# Patient Record
Sex: FEMALE
[De-identification: names, ages, dates, MRNs, and addresses within clinical notes are randomized per-mention and may not be internally consistent; named-entity substitution may affect disease eponyms.]

---

## 2017-10-09 ENCOUNTER — APPOINTMENT (OUTPATIENT)
Dept: OBGYN | Facility: CLINIC | Age: 32
End: 2017-10-09
Payer: COMMERCIAL

## 2017-10-09 VITALS
BODY MASS INDEX: 37.67 KG/M2 | DIASTOLIC BLOOD PRESSURE: 70 MMHG | SYSTOLIC BLOOD PRESSURE: 122 MMHG | WEIGHT: 240 LBS | HEIGHT: 67 IN

## 2017-10-09 DIAGNOSIS — Z80.0 FAMILY HISTORY OF MALIGNANT NEOPLASM OF DIGESTIVE ORGANS: ICD-10-CM

## 2017-10-09 DIAGNOSIS — Z82.49 FAMILY HISTORY OF ISCHEMIC HEART DISEASE AND OTHER DISEASES OF THE CIRCULATORY SYSTEM: ICD-10-CM

## 2017-10-09 DIAGNOSIS — Z83.3 FAMILY HISTORY OF DIABETES MELLITUS: ICD-10-CM

## 2017-10-09 DIAGNOSIS — Z00.00 ENCOUNTER FOR GENERAL ADULT MEDICAL EXAMINATION W/OUT ABNORMAL FINDINGS: ICD-10-CM

## 2017-10-09 DIAGNOSIS — Z80.2 FAMILY HISTORY OF MALIGNANT NEOPLASM OF OTHER RESPIRATORY AND INTRATHORACIC ORGANS: ICD-10-CM

## 2017-10-09 LAB
BILIRUB UR QL STRIP: NORMAL
CLARITY UR: CLEAR
COLLECTION METHOD: NORMAL
GLUCOSE UR-MCNC: NORMAL
HCG UR QL: 0.2 EU/DL
HGB UR QL STRIP.AUTO: NORMAL
KETONES UR-MCNC: NORMAL
LEUKOCYTE ESTERASE UR QL STRIP: NORMAL
NITRITE UR QL STRIP: NORMAL
PH UR STRIP: 6.5
PROT UR STRIP-MCNC: NORMAL
SP GR UR STRIP: 1.02

## 2017-10-09 PROCEDURE — 99385 PREV VISIT NEW AGE 18-39: CPT

## 2017-10-09 PROCEDURE — 81003 URINALYSIS AUTO W/O SCOPE: CPT | Mod: QW

## 2017-10-09 PROCEDURE — 36415 COLL VENOUS BLD VENIPUNCTURE: CPT

## 2017-10-10 LAB
25(OH)D3 SERPL-MCNC: 16.9 NG/ML
ALBUMIN SERPL ELPH-MCNC: 4.2 G/DL
ALP BLD-CCNC: 85 U/L
ALT SERPL-CCNC: 34 U/L
ANION GAP SERPL CALC-SCNC: 16 MMOL/L
APPEARANCE: CLEAR
AST SERPL-CCNC: 26 U/L
BACTERIA: ABNORMAL
BASOPHILS # BLD AUTO: 0.02 K/UL
BASOPHILS NFR BLD AUTO: 0.3 %
BILIRUB SERPL-MCNC: 1 MG/DL
BILIRUBIN URINE: NEGATIVE
BLOOD URINE: NEGATIVE
BUN SERPL-MCNC: 13 MG/DL
C TRACH RRNA SPEC QL NAA+PROBE: NOT DETECTED
CALCIUM OXALATE CRYSTALS: ABNORMAL
CALCIUM SERPL-MCNC: 9.2 MG/DL
CHLORIDE SERPL-SCNC: 102 MMOL/L
CHOLEST SERPL-MCNC: 179 MG/DL
CHOLEST/HDLC SERPL: 5.1 RATIO
CO2 SERPL-SCNC: 23 MMOL/L
COLOR: YELLOW
CREAT SERPL-MCNC: 0.68 MG/DL
EOSINOPHIL # BLD AUTO: 0.14 K/UL
EOSINOPHIL NFR BLD AUTO: 2 %
GLUCOSE QUALITATIVE U: NEGATIVE MG/DL
GLUCOSE SERPL-MCNC: 53 MG/DL
HBA1C MFR BLD HPLC: 5.4 %
HBV SURFACE AG SER QL: NONREACTIVE
HCT VFR BLD CALC: 37.4 %
HCV AB SER QL: NONREACTIVE
HCV S/CO RATIO: 0.28 S/CO
HDLC SERPL-MCNC: 35 MG/DL
HGB BLD-MCNC: 11.8 G/DL
HIV1+2 AB SPEC QL IA.RAPID: NONREACTIVE
HPV HIGH+LOW RISK DNA PNL CVX: NOT DETECTED
HYALINE CASTS: 0 /LPF
IMM GRANULOCYTES NFR BLD AUTO: 0.1 %
KETONES URINE: NEGATIVE
LDLC SERPL CALC-MCNC: 101 MG/DL
LEUKOCYTE ESTERASE URINE: NEGATIVE
LYMPHOCYTES # BLD AUTO: 2.2 K/UL
LYMPHOCYTES NFR BLD AUTO: 30.9 %
MAN DIFF?: NORMAL
MCHC RBC-ENTMCNC: 26.8 PG
MCHC RBC-ENTMCNC: 31.6 GM/DL
MCV RBC AUTO: 85 FL
MICROSCOPIC-UA: NORMAL
MONOCYTES # BLD AUTO: 0.47 K/UL
MONOCYTES NFR BLD AUTO: 6.6 %
N GONORRHOEA RRNA SPEC QL NAA+PROBE: NOT DETECTED
NEUTROPHILS # BLD AUTO: 4.28 K/UL
NEUTROPHILS NFR BLD AUTO: 60.1 %
NITRITE URINE: NEGATIVE
PH URINE: 6.5
PLATELET # BLD AUTO: 318 K/UL
POTASSIUM SERPL-SCNC: 3.9 MMOL/L
PROT SERPL-MCNC: 7.6 G/DL
PROTEIN URINE: NEGATIVE MG/DL
RBC # BLD: 4.4 M/UL
RBC # FLD: 14.9 %
RED BLOOD CELLS URINE: 4 /HPF
RUBV IGG FLD-ACNC: 15 INDEX
RUBV IGG SER-IMP: POSITIVE
SODIUM SERPL-SCNC: 141 MMOL/L
SOURCE TP AMPLIFICATION: NORMAL
SPECIFIC GRAVITY URINE: 1.02
SQUAMOUS EPITHELIAL CELLS: 4 /HPF
T PALLIDUM AB SER QL IA: NEGATIVE
TRIGL SERPL-MCNC: 213 MG/DL
TSH SERPL-ACNC: 2.55 UIU/ML
UROBILINOGEN URINE: NEGATIVE MG/DL
WBC # FLD AUTO: 7.12 K/UL
WHITE BLOOD CELLS URINE: 2 /HPF

## 2017-10-12 LAB — CYTOLOGY CVX/VAG DOC THIN PREP: NORMAL

## 2017-10-18 LAB
AR GENE MUT ANL BLD/T: NORMAL
CFTR MUT TESTED BLD/T: NORMAL

## 2017-10-19 ENCOUNTER — RESULT REVIEW (OUTPATIENT)
Age: 32
End: 2017-10-19

## 2017-10-31 ENCOUNTER — APPOINTMENT (OUTPATIENT)
Dept: OBGYN | Facility: CLINIC | Age: 32
End: 2017-10-31

## 2017-11-01 ENCOUNTER — APPOINTMENT (OUTPATIENT)
Dept: OBGYN | Facility: CLINIC | Age: 32
End: 2017-11-01
Payer: COMMERCIAL

## 2017-11-01 VITALS
WEIGHT: 240 LBS | BODY MASS INDEX: 37.67 KG/M2 | DIASTOLIC BLOOD PRESSURE: 78 MMHG | SYSTOLIC BLOOD PRESSURE: 124 MMHG | HEIGHT: 67 IN

## 2017-11-01 PROCEDURE — 76817 TRANSVAGINAL US OBSTETRIC: CPT

## 2017-11-01 PROCEDURE — 81025 URINE PREGNANCY TEST: CPT

## 2017-11-01 PROCEDURE — 99213 OFFICE O/P EST LOW 20 MIN: CPT | Mod: 25

## 2017-11-02 LAB
HCG UR QL: POSITIVE
QUALITY CONTROL: YES

## 2017-11-21 DIAGNOSIS — Z86.018 PERSONAL HISTORY OF OTHER BENIGN NEOPLASM: ICD-10-CM

## 2017-11-22 ENCOUNTER — APPOINTMENT (OUTPATIENT)
Dept: OBGYN | Facility: CLINIC | Age: 32
End: 2017-11-22

## 2017-11-24 ENCOUNTER — NON-APPOINTMENT (OUTPATIENT)
Age: 32
End: 2017-11-24

## 2017-11-24 ENCOUNTER — APPOINTMENT (OUTPATIENT)
Dept: OBGYN | Facility: CLINIC | Age: 32
End: 2017-11-24
Payer: COMMERCIAL

## 2017-11-24 PROCEDURE — 36415 COLL VENOUS BLD VENIPUNCTURE: CPT

## 2017-11-24 PROCEDURE — 0501F PRENATAL FLOW SHEET: CPT

## 2017-11-26 ENCOUNTER — TRANSCRIPTION ENCOUNTER (OUTPATIENT)
Age: 32
End: 2017-11-26

## 2017-11-27 ENCOUNTER — RESULT REVIEW (OUTPATIENT)
Age: 32
End: 2017-11-27

## 2017-11-27 LAB
ABO + RH PNL BLD: NORMAL
APPEARANCE: CLEAR
BACTERIA UR CULT: NORMAL
BACTERIA: NEGATIVE
BILIRUBIN URINE: NEGATIVE
BLD GP AB SCN SERPL QL: NORMAL
BLOOD URINE: NEGATIVE
COLOR: YELLOW
GLUCOSE 1H P 50 G GLC PO SERPL-MCNC: 131 MG/DL
GLUCOSE QUALITATIVE U: NEGATIVE MG/DL
HYALINE CASTS: 8 /LPF
KETONES URINE: ABNORMAL
LEUKOCYTE ESTERASE URINE: NEGATIVE
MICROSCOPIC-UA: NORMAL
NITRITE URINE: NEGATIVE
PH URINE: 5
PROTEIN URINE: NEGATIVE MG/DL
RED BLOOD CELLS URINE: 6 /HPF
SPECIFIC GRAVITY URINE: 1.03
SQUAMOUS EPITHELIAL CELLS: 6 /HPF
UROBILINOGEN URINE: NEGATIVE MG/DL
WHITE BLOOD CELLS URINE: 10 /HPF

## 2017-11-28 ENCOUNTER — APPOINTMENT (OUTPATIENT)
Dept: ANTEPARTUM | Facility: CLINIC | Age: 32
End: 2017-11-28

## 2017-11-30 ENCOUNTER — RESULT REVIEW (OUTPATIENT)
Age: 32
End: 2017-11-30

## 2017-11-30 LAB
CREAT 24H UR-MCNC: 1.6 G/24 H
CREAT 24H UR-MCNC: 1.6 G/24 H
CREAT ?TM UR-MCNC: 63 MG/DL
CREAT ?TM UR-MCNC: 64 MG/DL
PROT 24H UR-MRATE: 4 MG/DL
PROT ?TM UR-MCNC: 24 HR
PROT ?TM UR-MCNC: 24 HR
PROT UR-MCNC: 103 MG/24 H
SPECIMEN VOL 24H UR: 2575 ML
SPECIMEN VOL 24H UR: 2575 ML

## 2017-12-12 ENCOUNTER — APPOINTMENT (OUTPATIENT)
Dept: ANTEPARTUM | Facility: CLINIC | Age: 32
End: 2017-12-12
Payer: COMMERCIAL

## 2017-12-12 PROCEDURE — 76801 OB US < 14 WKS SINGLE FETUS: CPT

## 2017-12-12 PROCEDURE — 76813 OB US NUCHAL MEAS 1 GEST: CPT

## 2017-12-12 PROCEDURE — 36416 COLLJ CAPILLARY BLOOD SPEC: CPT

## 2017-12-14 ENCOUNTER — LABORATORY RESULT (OUTPATIENT)
Age: 32
End: 2017-12-14

## 2017-12-15 ENCOUNTER — APPOINTMENT (OUTPATIENT)
Dept: OBGYN | Facility: CLINIC | Age: 32
End: 2017-12-15
Payer: COMMERCIAL

## 2017-12-15 ENCOUNTER — NON-APPOINTMENT (OUTPATIENT)
Age: 32
End: 2017-12-15

## 2017-12-15 VITALS
SYSTOLIC BLOOD PRESSURE: 114 MMHG | BODY MASS INDEX: 38.61 KG/M2 | WEIGHT: 246 LBS | RESPIRATION RATE: 16 BRPM | DIASTOLIC BLOOD PRESSURE: 82 MMHG | HEIGHT: 67 IN

## 2017-12-15 LAB
BILIRUB UR QL STRIP: NORMAL
CLARITY UR: CLEAR
COLLECTION METHOD: NORMAL
GLUCOSE UR-MCNC: NORMAL
HCG UR QL: 0.2 EU/DL
HGB UR QL STRIP.AUTO: NORMAL
KETONES UR-MCNC: NORMAL
LEUKOCYTE ESTERASE UR QL STRIP: NORMAL
NITRITE UR QL STRIP: NORMAL
PH UR STRIP: 6
PROT UR STRIP-MCNC: NORMAL
SP GR UR STRIP: 1.01

## 2017-12-15 PROCEDURE — 0502F SUBSEQUENT PRENATAL CARE: CPT

## 2017-12-15 PROCEDURE — 76817 TRANSVAGINAL US OBSTETRIC: CPT

## 2017-12-15 RX ORDER — PINDOLOL 10 MG/1
10 TABLET ORAL
Refills: 0 | Status: DISCONTINUED | COMMUNITY
End: 2017-12-15

## 2017-12-18 ENCOUNTER — RESULT REVIEW (OUTPATIENT)
Age: 32
End: 2017-12-18

## 2017-12-26 ENCOUNTER — APPOINTMENT (OUTPATIENT)
Dept: OBGYN | Facility: CLINIC | Age: 32
End: 2017-12-26
Payer: COMMERCIAL

## 2017-12-26 ENCOUNTER — NON-APPOINTMENT (OUTPATIENT)
Age: 32
End: 2017-12-26

## 2017-12-26 VITALS
RESPIRATION RATE: 16 BRPM | WEIGHT: 250 LBS | SYSTOLIC BLOOD PRESSURE: 102 MMHG | DIASTOLIC BLOOD PRESSURE: 60 MMHG | BODY MASS INDEX: 39.24 KG/M2 | HEIGHT: 67 IN

## 2017-12-26 LAB
BILIRUB UR QL STRIP: NORMAL
CLARITY UR: NORMAL
COLLECTION METHOD: NORMAL
GLUCOSE UR-MCNC: NORMAL
HCG UR QL: 0.2 EU/DL
HGB UR QL STRIP.AUTO: NORMAL
KETONES UR-MCNC: NORMAL
LEUKOCYTE ESTERASE UR QL STRIP: NORMAL
NITRITE UR QL STRIP: NORMAL
PH UR STRIP: 6.5
PROT UR STRIP-MCNC: NORMAL
SP GR UR STRIP: 1.01

## 2017-12-26 PROCEDURE — 0502F SUBSEQUENT PRENATAL CARE: CPT

## 2018-01-16 ENCOUNTER — TRANSCRIPTION ENCOUNTER (OUTPATIENT)
Age: 33
End: 2018-01-16

## 2018-01-23 ENCOUNTER — TRANSCRIPTION ENCOUNTER (OUTPATIENT)
Age: 33
End: 2018-01-23

## 2018-01-23 ENCOUNTER — APPOINTMENT (OUTPATIENT)
Dept: OBGYN | Facility: CLINIC | Age: 33
End: 2018-01-23
Payer: COMMERCIAL

## 2018-01-23 VITALS
DIASTOLIC BLOOD PRESSURE: 70 MMHG | BODY MASS INDEX: 41.49 KG/M2 | HEIGHT: 67 IN | WEIGHT: 264.33 LBS | SYSTOLIC BLOOD PRESSURE: 122 MMHG

## 2018-01-23 PROCEDURE — 36415 COLL VENOUS BLD VENIPUNCTURE: CPT

## 2018-01-23 PROCEDURE — 0502F SUBSEQUENT PRENATAL CARE: CPT

## 2018-01-27 ENCOUNTER — NON-APPOINTMENT (OUTPATIENT)
Age: 33
End: 2018-01-27

## 2018-01-30 LAB
1ST TRIMESTER DATA: NORMAL
2ND TRIMESTER DATA: NORMAL
AFP PNL SERPL: NORMAL
AFP SERPL-ACNC: NORMAL
AFP SERPL-ACNC: NORMAL
B-HCG FREE SERPL-MCNC: NORMAL
CLINICAL BIOCHEMIST REVIEW: NORMAL
FREE BETA HCG 1ST TRIMESTER: NORMAL
INHIBIN A SERPL-MCNC: NORMAL
NASAL BONE: PRESENT
NOTES NTD: NORMAL
NT: NORMAL
PAPP-A SERPL-ACNC: NORMAL
U ESTRIOL SERPL-SCNC: NORMAL

## 2018-02-06 ENCOUNTER — APPOINTMENT (OUTPATIENT)
Dept: ANTEPARTUM | Facility: CLINIC | Age: 33
End: 2018-02-06
Payer: COMMERCIAL

## 2018-02-06 ENCOUNTER — ASOB RESULT (OUTPATIENT)
Age: 33
End: 2018-02-06

## 2018-02-06 PROCEDURE — 76811 OB US DETAILED SNGL FETUS: CPT

## 2018-02-06 PROCEDURE — 76817 TRANSVAGINAL US OBSTETRIC: CPT

## 2018-02-20 ENCOUNTER — APPOINTMENT (OUTPATIENT)
Dept: OBGYN | Facility: CLINIC | Age: 33
End: 2018-02-20
Payer: COMMERCIAL

## 2018-02-20 ENCOUNTER — NON-APPOINTMENT (OUTPATIENT)
Age: 33
End: 2018-02-20

## 2018-02-20 VITALS
BODY MASS INDEX: 40.49 KG/M2 | DIASTOLIC BLOOD PRESSURE: 64 MMHG | TEMPERATURE: 98.6 F | RESPIRATION RATE: 16 BRPM | HEIGHT: 67 IN | SYSTOLIC BLOOD PRESSURE: 104 MMHG | WEIGHT: 258 LBS

## 2018-02-20 PROCEDURE — 0502F SUBSEQUENT PRENATAL CARE: CPT

## 2018-03-13 ENCOUNTER — NON-APPOINTMENT (OUTPATIENT)
Age: 33
End: 2018-03-13

## 2018-03-13 ENCOUNTER — APPOINTMENT (OUTPATIENT)
Dept: OBGYN | Facility: CLINIC | Age: 33
End: 2018-03-13
Payer: COMMERCIAL

## 2018-03-13 VITALS
TEMPERATURE: 98.2 F | RESPIRATION RATE: 16 BRPM | HEIGHT: 67 IN | DIASTOLIC BLOOD PRESSURE: 78 MMHG | BODY MASS INDEX: 40.97 KG/M2 | SYSTOLIC BLOOD PRESSURE: 106 MMHG | WEIGHT: 261 LBS

## 2018-03-13 LAB
BILIRUB UR QL STRIP: NORMAL
CLARITY UR: CLEAR
COLLECTION METHOD: NORMAL
GLUCOSE UR-MCNC: NORMAL
HCG UR QL: 0.2 EU/DL
HGB UR QL STRIP.AUTO: NORMAL
KETONES UR-MCNC: NORMAL
LEUKOCYTE ESTERASE UR QL STRIP: NORMAL
NITRITE UR QL STRIP: NORMAL
PH UR STRIP: 7
PROT UR STRIP-MCNC: NORMAL
SP GR UR STRIP: 1.01

## 2018-03-13 PROCEDURE — 36415 COLL VENOUS BLD VENIPUNCTURE: CPT

## 2018-03-13 PROCEDURE — 82950 GLUCOSE TEST: CPT | Mod: QW

## 2018-03-13 PROCEDURE — 0502F SUBSEQUENT PRENATAL CARE: CPT

## 2018-03-14 ENCOUNTER — MESSAGE (OUTPATIENT)
Age: 33
End: 2018-03-14

## 2018-03-14 LAB
BASOPHILS # BLD AUTO: 0.01 K/UL
BASOPHILS NFR BLD AUTO: 0.1 %
EOSINOPHIL # BLD AUTO: 0.1 K/UL
EOSINOPHIL NFR BLD AUTO: 1.3 %
GLUCOSE 1H P 50 G GLC PO SERPL-MCNC: 93 MG/DL
HCT VFR BLD CALC: 32.9 %
HGB BLD-MCNC: 10.7 G/DL
IMM GRANULOCYTES NFR BLD AUTO: 0.3 %
LYMPHOCYTES # BLD AUTO: 1.4 K/UL
LYMPHOCYTES NFR BLD AUTO: 18.2 %
MAN DIFF?: NORMAL
MCHC RBC-ENTMCNC: 27.6 PG
MCHC RBC-ENTMCNC: 32.5 GM/DL
MCV RBC AUTO: 84.8 FL
MONOCYTES # BLD AUTO: 0.58 K/UL
MONOCYTES NFR BLD AUTO: 7.5 %
NEUTROPHILS # BLD AUTO: 5.59 K/UL
NEUTROPHILS NFR BLD AUTO: 72.6 %
PLATELET # BLD AUTO: 311 K/UL
RBC # BLD: 3.88 M/UL
RBC # FLD: 14.9 %
WBC # FLD AUTO: 7.7 K/UL

## 2018-04-03 ENCOUNTER — APPOINTMENT (OUTPATIENT)
Dept: OBGYN | Facility: CLINIC | Age: 33
End: 2018-04-03
Payer: COMMERCIAL

## 2018-04-03 ENCOUNTER — NON-APPOINTMENT (OUTPATIENT)
Age: 33
End: 2018-04-03

## 2018-04-03 VITALS
WEIGHT: 264.55 LBS | HEIGHT: 67 IN | BODY MASS INDEX: 41.52 KG/M2 | TEMPERATURE: 98.4 F | DIASTOLIC BLOOD PRESSURE: 70 MMHG | SYSTOLIC BLOOD PRESSURE: 120 MMHG

## 2018-04-03 LAB
BILIRUB UR QL STRIP: NORMAL
CLARITY UR: CLEAR
COLLECTION METHOD: NORMAL
GLUCOSE UR-MCNC: NORMAL
HCG UR QL: 0.2 EU/DL
HGB UR QL STRIP.AUTO: NORMAL
KETONES UR-MCNC: NORMAL
LEUKOCYTE ESTERASE UR QL STRIP: NORMAL
NITRITE UR QL STRIP: NORMAL
PH UR STRIP: 6
PROT UR STRIP-MCNC: NORMAL
SP GR UR STRIP: 1.03

## 2018-04-03 PROCEDURE — 90715 TDAP VACCINE 7 YRS/> IM: CPT

## 2018-04-03 PROCEDURE — 81003 URINALYSIS AUTO W/O SCOPE: CPT | Mod: QW

## 2018-04-03 PROCEDURE — 90471 IMMUNIZATION ADMIN: CPT

## 2018-04-03 PROCEDURE — 0502F SUBSEQUENT PRENATAL CARE: CPT

## 2018-04-05 ENCOUNTER — APPOINTMENT (OUTPATIENT)
Dept: ANTEPARTUM | Facility: CLINIC | Age: 33
End: 2018-04-05
Payer: COMMERCIAL

## 2018-04-05 ENCOUNTER — ASOB RESULT (OUTPATIENT)
Age: 33
End: 2018-04-05

## 2018-04-05 PROCEDURE — 76816 OB US FOLLOW-UP PER FETUS: CPT

## 2018-04-05 PROCEDURE — 76819 FETAL BIOPHYS PROFIL W/O NST: CPT

## 2018-04-17 ENCOUNTER — APPOINTMENT (OUTPATIENT)
Dept: OBGYN | Facility: CLINIC | Age: 33
End: 2018-04-17
Payer: COMMERCIAL

## 2018-04-17 VITALS
SYSTOLIC BLOOD PRESSURE: 120 MMHG | BODY MASS INDEX: 42.46 KG/M2 | HEIGHT: 67 IN | DIASTOLIC BLOOD PRESSURE: 80 MMHG | TEMPERATURE: 98.3 F | WEIGHT: 270.5 LBS

## 2018-04-17 DIAGNOSIS — Z34.92 ENCOUNTER FOR SUPERVISION OF NORMAL PREGNANCY, UNSPECIFIED, SECOND TRIMESTER: ICD-10-CM

## 2018-04-17 PROCEDURE — 0502F SUBSEQUENT PRENATAL CARE: CPT

## 2018-04-17 PROCEDURE — 81003 URINALYSIS AUTO W/O SCOPE: CPT | Mod: QW

## 2018-04-17 PROCEDURE — 76817 TRANSVAGINAL US OBSTETRIC: CPT

## 2018-04-18 ENCOUNTER — NON-APPOINTMENT (OUTPATIENT)
Age: 33
End: 2018-04-18

## 2018-04-18 PROBLEM — Z34.92 ENCOUNTER FOR PREGNANCY RELATED EXAMINATION IN SECOND TRIMESTER: Status: RESOLVED | Noted: 2017-12-26 | Resolved: 2018-04-18

## 2018-04-18 LAB
BILIRUB UR QL STRIP: NORMAL
GLUCOSE UR-MCNC: NORMAL
HCG UR QL: 1 EU/DL
HGB UR QL STRIP.AUTO: NORMAL
KETONES UR-MCNC: NORMAL
LEUKOCYTE ESTERASE UR QL STRIP: NORMAL
NITRITE UR QL STRIP: NORMAL
PH UR STRIP: 6
PROT UR STRIP-MCNC: NORMAL
SP GR UR STRIP: 1.02

## 2018-04-30 ENCOUNTER — NON-APPOINTMENT (OUTPATIENT)
Age: 33
End: 2018-04-30

## 2018-04-30 ENCOUNTER — APPOINTMENT (OUTPATIENT)
Dept: OBGYN | Facility: CLINIC | Age: 33
End: 2018-04-30
Payer: COMMERCIAL

## 2018-04-30 VITALS
SYSTOLIC BLOOD PRESSURE: 124 MMHG | TEMPERATURE: 97.7 F | HEIGHT: 67 IN | BODY MASS INDEX: 41.59 KG/M2 | DIASTOLIC BLOOD PRESSURE: 88 MMHG | WEIGHT: 265 LBS

## 2018-04-30 PROCEDURE — 59025 FETAL NON-STRESS TEST: CPT

## 2018-04-30 PROCEDURE — 0502F SUBSEQUENT PRENATAL CARE: CPT

## 2018-04-30 PROCEDURE — 76817 TRANSVAGINAL US OBSTETRIC: CPT

## 2018-04-30 PROCEDURE — 81003 URINALYSIS AUTO W/O SCOPE: CPT | Mod: QW

## 2018-05-08 ENCOUNTER — NON-APPOINTMENT (OUTPATIENT)
Age: 33
End: 2018-05-08

## 2018-05-08 ENCOUNTER — APPOINTMENT (OUTPATIENT)
Dept: OBGYN | Facility: CLINIC | Age: 33
End: 2018-05-08
Payer: COMMERCIAL

## 2018-05-08 VITALS
HEIGHT: 67 IN | SYSTOLIC BLOOD PRESSURE: 110 MMHG | RESPIRATION RATE: 16 BRPM | WEIGHT: 266 LBS | BODY MASS INDEX: 41.75 KG/M2 | DIASTOLIC BLOOD PRESSURE: 60 MMHG

## 2018-05-08 PROCEDURE — 81003 URINALYSIS AUTO W/O SCOPE: CPT | Mod: QW

## 2018-05-08 PROCEDURE — 0502F SUBSEQUENT PRENATAL CARE: CPT

## 2018-05-15 ENCOUNTER — APPOINTMENT (OUTPATIENT)
Dept: OBGYN | Facility: CLINIC | Age: 33
End: 2018-05-15
Payer: COMMERCIAL

## 2018-05-15 VITALS
HEIGHT: 67 IN | BODY MASS INDEX: 42.53 KG/M2 | WEIGHT: 271 LBS | SYSTOLIC BLOOD PRESSURE: 118 MMHG | TEMPERATURE: 98.3 F | RESPIRATION RATE: 16 BRPM | DIASTOLIC BLOOD PRESSURE: 78 MMHG

## 2018-05-15 LAB
BILIRUB UR QL STRIP: NORMAL
CLARITY UR: CLEAR
COLLECTION METHOD: NORMAL
GLUCOSE UR-MCNC: NORMAL
HCG UR QL: 1 EU/DL
HGB UR QL STRIP.AUTO: NORMAL
KETONES UR-MCNC: NORMAL
LEUKOCYTE ESTERASE UR QL STRIP: NORMAL
NITRITE UR QL STRIP: NORMAL
PH UR STRIP: 7
PROT UR STRIP-MCNC: NORMAL
SP GR UR STRIP: 1.02

## 2018-05-15 PROCEDURE — 76817 TRANSVAGINAL US OBSTETRIC: CPT

## 2018-05-15 PROCEDURE — 59025 FETAL NON-STRESS TEST: CPT

## 2018-05-15 PROCEDURE — 81003 URINALYSIS AUTO W/O SCOPE: CPT | Mod: QW

## 2018-05-15 PROCEDURE — 0502F SUBSEQUENT PRENATAL CARE: CPT

## 2018-05-16 ENCOUNTER — NON-APPOINTMENT (OUTPATIENT)
Age: 33
End: 2018-05-16

## 2018-05-17 ENCOUNTER — APPOINTMENT (OUTPATIENT)
Dept: ANTEPARTUM | Facility: CLINIC | Age: 33
End: 2018-05-17
Payer: COMMERCIAL

## 2018-05-17 ENCOUNTER — ASOB RESULT (OUTPATIENT)
Age: 33
End: 2018-05-17

## 2018-05-17 PROCEDURE — 76819 FETAL BIOPHYS PROFIL W/O NST: CPT

## 2018-05-17 PROCEDURE — 76816 OB US FOLLOW-UP PER FETUS: CPT

## 2018-05-22 ENCOUNTER — APPOINTMENT (OUTPATIENT)
Dept: OBGYN | Facility: CLINIC | Age: 33
End: 2018-05-22
Payer: COMMERCIAL

## 2018-05-22 VITALS
SYSTOLIC BLOOD PRESSURE: 120 MMHG | DIASTOLIC BLOOD PRESSURE: 74 MMHG | BODY MASS INDEX: 43.32 KG/M2 | HEIGHT: 67 IN | WEIGHT: 276 LBS

## 2018-05-22 PROCEDURE — 76817 TRANSVAGINAL US OBSTETRIC: CPT

## 2018-05-22 PROCEDURE — 59025 FETAL NON-STRESS TEST: CPT

## 2018-05-22 PROCEDURE — 36415 COLL VENOUS BLD VENIPUNCTURE: CPT

## 2018-05-22 PROCEDURE — 0502F SUBSEQUENT PRENATAL CARE: CPT

## 2018-05-23 ENCOUNTER — NON-APPOINTMENT (OUTPATIENT)
Age: 33
End: 2018-05-23

## 2018-05-23 LAB
ALBUMIN SERPL ELPH-MCNC: 3.5 G/DL
ALP BLD-CCNC: 148 U/L
ALT SERPL-CCNC: 17 U/L
ANION GAP SERPL CALC-SCNC: 18 MMOL/L
AST SERPL-CCNC: 12 U/L
BASOPHILS # BLD AUTO: 0.02 K/UL
BASOPHILS NFR BLD AUTO: 0.2 %
BILIRUB SERPL-MCNC: 0.7 MG/DL
BUN SERPL-MCNC: 11 MG/DL
CALCIUM SERPL-MCNC: 9.5 MG/DL
CHLORIDE SERPL-SCNC: 99 MMOL/L
CO2 SERPL-SCNC: 20 MMOL/L
CREAT SERPL-MCNC: 0.68 MG/DL
EOSINOPHIL # BLD AUTO: 0.13 K/UL
EOSINOPHIL NFR BLD AUTO: 1.3 %
GLUCOSE SERPL-MCNC: 49 MG/DL
HCT VFR BLD CALC: 35.2 %
HGB BLD-MCNC: 10.9 G/DL
HIV1+2 AB SPEC QL IA.RAPID: NONREACTIVE
IMM GRANULOCYTES NFR BLD AUTO: 0.3 %
LYMPHOCYTES # BLD AUTO: 1.7 K/UL
LYMPHOCYTES NFR BLD AUTO: 17.2 %
MAN DIFF?: NORMAL
MCHC RBC-ENTMCNC: 26 PG
MCHC RBC-ENTMCNC: 31 GM/DL
MCV RBC AUTO: 83.8 FL
MONOCYTES # BLD AUTO: 0.53 K/UL
MONOCYTES NFR BLD AUTO: 5.4 %
NEUTROPHILS # BLD AUTO: 7.49 K/UL
NEUTROPHILS NFR BLD AUTO: 75.6 %
PLATELET # BLD AUTO: 314 K/UL
POTASSIUM SERPL-SCNC: 4.5 MMOL/L
PROT SERPL-MCNC: 7.1 G/DL
RBC # BLD: 4.2 M/UL
RBC # FLD: 15.6 %
SODIUM SERPL-SCNC: 137 MMOL/L
WBC # FLD AUTO: 9.9 K/UL

## 2018-05-29 ENCOUNTER — NON-APPOINTMENT (OUTPATIENT)
Age: 33
End: 2018-05-29

## 2018-05-29 ENCOUNTER — APPOINTMENT (OUTPATIENT)
Dept: OBGYN | Facility: CLINIC | Age: 33
End: 2018-05-29
Payer: COMMERCIAL

## 2018-05-29 VITALS
SYSTOLIC BLOOD PRESSURE: 108 MMHG | HEIGHT: 67 IN | RESPIRATION RATE: 16 BRPM | TEMPERATURE: 98.6 F | DIASTOLIC BLOOD PRESSURE: 70 MMHG

## 2018-05-29 LAB
BILIRUB UR QL STRIP: NORMAL
CLARITY UR: CLEAR
COLLECTION METHOD: NORMAL
GLUCOSE UR-MCNC: NORMAL
GP B STREP DNA SPEC QL NAA+PROBE: NORMAL
GP B STREP DNA SPEC QL NAA+PROBE: NOT DETECTED
HCG UR QL: 0.2 EU/DL
HGB UR QL STRIP.AUTO: NORMAL
KETONES UR-MCNC: NORMAL
LEUKOCYTE ESTERASE UR QL STRIP: NORMAL
NITRITE UR QL STRIP: NORMAL
PH UR STRIP: 6.5
PROT UR STRIP-MCNC: NORMAL
SOURCE GBS: NORMAL
SP GR UR STRIP: 1.02

## 2018-05-29 PROCEDURE — 0502F SUBSEQUENT PRENATAL CARE: CPT

## 2018-05-29 PROCEDURE — 59025 FETAL NON-STRESS TEST: CPT

## 2018-05-29 PROCEDURE — 76817 TRANSVAGINAL US OBSTETRIC: CPT

## 2018-05-29 PROCEDURE — 81003 URINALYSIS AUTO W/O SCOPE: CPT | Mod: QW

## 2018-05-31 LAB — GLUCOSE 1H P 50 G GLC PO SERPL-MCNC: 127 MG/DL

## 2018-06-01 ENCOUNTER — RESULT REVIEW (OUTPATIENT)
Age: 33
End: 2018-06-01

## 2018-06-05 ENCOUNTER — APPOINTMENT (OUTPATIENT)
Dept: OBGYN | Facility: CLINIC | Age: 33
End: 2018-06-05
Payer: COMMERCIAL

## 2018-06-05 VITALS
BODY MASS INDEX: 42.06 KG/M2 | DIASTOLIC BLOOD PRESSURE: 80 MMHG | WEIGHT: 268.52 LBS | SYSTOLIC BLOOD PRESSURE: 128 MMHG

## 2018-06-05 PROCEDURE — 81003 URINALYSIS AUTO W/O SCOPE: CPT | Mod: QW

## 2018-06-05 PROCEDURE — 0502F SUBSEQUENT PRENATAL CARE: CPT

## 2018-06-05 PROCEDURE — 76817 TRANSVAGINAL US OBSTETRIC: CPT

## 2018-06-05 PROCEDURE — 59025 FETAL NON-STRESS TEST: CPT

## 2018-06-06 LAB
BILIRUB UR QL STRIP: NORMAL
CLARITY UR: NORMAL
COLLECTION METHOD: NORMAL
GLUCOSE UR-MCNC: NORMAL
HCG UR QL: 1 EU/DL
HGB UR QL STRIP.AUTO: NORMAL
KETONES UR-MCNC: NORMAL
LEUKOCYTE ESTERASE UR QL STRIP: NORMAL
NITRITE UR QL STRIP: NORMAL
PH UR STRIP: 6
PROT UR STRIP-MCNC: 30
SP GR UR STRIP: 1.02

## 2018-06-12 ENCOUNTER — APPOINTMENT (OUTPATIENT)
Dept: OBGYN | Facility: CLINIC | Age: 33
End: 2018-06-12
Payer: COMMERCIAL

## 2018-06-12 ENCOUNTER — NON-APPOINTMENT (OUTPATIENT)
Age: 33
End: 2018-06-12

## 2018-06-12 VITALS — DIASTOLIC BLOOD PRESSURE: 74 MMHG | HEIGHT: 67 IN | SYSTOLIC BLOOD PRESSURE: 125 MMHG

## 2018-06-12 VITALS — BODY MASS INDEX: 42.54 KG/M2 | WEIGHT: 271.6 LBS

## 2018-06-12 LAB
BILIRUB UR QL STRIP: NORMAL
COLLECTION METHOD: NORMAL
GLUCOSE UR-MCNC: NORMAL
HCG UR QL: 0.2 EU/DL
HGB UR QL STRIP.AUTO: NORMAL
KETONES UR-MCNC: NORMAL
LEUKOCYTE ESTERASE UR QL STRIP: NORMAL
NITRITE UR QL STRIP: NORMAL
PH UR STRIP: 7
PROT UR STRIP-MCNC: NORMAL
SP GR UR STRIP: 1.01

## 2018-06-12 PROCEDURE — 76817 TRANSVAGINAL US OBSTETRIC: CPT

## 2018-06-12 PROCEDURE — 0502F SUBSEQUENT PRENATAL CARE: CPT

## 2018-06-12 PROCEDURE — 81003 URINALYSIS AUTO W/O SCOPE: CPT | Mod: QW

## 2018-06-12 PROCEDURE — 59025 FETAL NON-STRESS TEST: CPT

## 2018-06-15 ENCOUNTER — OUTPATIENT (OUTPATIENT)
Dept: INPATIENT UNIT | Facility: HOSPITAL | Age: 33
LOS: 1 days | Discharge: HOME | End: 2018-06-15

## 2018-06-15 LAB — AMNISURE ROM (RUPTURE OF MEMBRANES): NEGATIVE — SIGNIFICANT CHANGE UP

## 2018-06-17 RX ORDER — LABETALOL HYDROCHLORIDE 200 MG/1
200 TABLET, FILM COATED ORAL
Qty: 30 | Refills: 2 | Status: DISCONTINUED | COMMUNITY
Start: 2017-12-15 | End: 2018-06-17

## 2018-06-19 ENCOUNTER — INPATIENT (INPATIENT)
Facility: HOSPITAL | Age: 33
LOS: 5 days | Discharge: HOME | End: 2018-06-25
Attending: OBSTETRICS & GYNECOLOGY | Admitting: OBSTETRICS & GYNECOLOGY
Payer: COMMERCIAL

## 2018-06-19 VITALS — HEIGHT: 67 IN | WEIGHT: 268.96 LBS

## 2018-06-19 LAB
ALBUMIN SERPL ELPH-MCNC: 2.6 G/DL — LOW (ref 3.3–5)
ALP SERPL-CCNC: 158 U/L — HIGH (ref 40–120)
ALT FLD-CCNC: 20 U/L — SIGNIFICANT CHANGE UP (ref 12–78)
ANION GAP SERPL CALC-SCNC: 12 MMOL/L — SIGNIFICANT CHANGE UP (ref 5–17)
AST SERPL-CCNC: 14 U/L — LOW (ref 15–37)
BASOPHILS # BLD AUTO: 0.03 K/UL — SIGNIFICANT CHANGE UP (ref 0–0.2)
BASOPHILS NFR BLD AUTO: 0.3 % — SIGNIFICANT CHANGE UP (ref 0–2)
BILIRUB SERPL-MCNC: 0.5 MG/DL — SIGNIFICANT CHANGE UP (ref 0.2–1.2)
BUN SERPL-MCNC: 13 MG/DL — SIGNIFICANT CHANGE UP (ref 7–23)
CALCIUM SERPL-MCNC: 9 MG/DL — SIGNIFICANT CHANGE UP (ref 8.5–10.1)
CHLORIDE SERPL-SCNC: 104 MMOL/L — SIGNIFICANT CHANGE UP (ref 96–108)
CO2 SERPL-SCNC: 20 MMOL/L — LOW (ref 22–31)
CREAT SERPL-MCNC: 0.59 MG/DL — SIGNIFICANT CHANGE UP (ref 0.5–1.3)
EOSINOPHIL # BLD AUTO: 0.19 K/UL — SIGNIFICANT CHANGE UP (ref 0–0.5)
EOSINOPHIL NFR BLD AUTO: 1.7 % — SIGNIFICANT CHANGE UP (ref 0–6)
GLUCOSE SERPL-MCNC: 89 MG/DL — SIGNIFICANT CHANGE UP (ref 70–99)
HCT VFR BLD CALC: 32.4 % — LOW (ref 34.5–45)
HGB BLD-MCNC: 10.6 G/DL — LOW (ref 11.5–15.5)
IMM GRANULOCYTES NFR BLD AUTO: 0.9 % — SIGNIFICANT CHANGE UP (ref 0–1.5)
LYMPHOCYTES # BLD AUTO: 1.85 K/UL — SIGNIFICANT CHANGE UP (ref 1–3.3)
LYMPHOCYTES # BLD AUTO: 16.8 % — SIGNIFICANT CHANGE UP (ref 13–44)
MCHC RBC-ENTMCNC: 26.5 PG — LOW (ref 27–34)
MCHC RBC-ENTMCNC: 32.7 GM/DL — SIGNIFICANT CHANGE UP (ref 32–36)
MCV RBC AUTO: 81 FL — SIGNIFICANT CHANGE UP (ref 80–100)
MONOCYTES # BLD AUTO: 0.74 K/UL — SIGNIFICANT CHANGE UP (ref 0–0.9)
MONOCYTES NFR BLD AUTO: 6.7 % — SIGNIFICANT CHANGE UP (ref 2–14)
NEUTROPHILS # BLD AUTO: 8.11 K/UL — HIGH (ref 1.8–7.4)
NEUTROPHILS NFR BLD AUTO: 73.6 % — SIGNIFICANT CHANGE UP (ref 43–77)
NRBC # BLD: 0 /100 WBCS — SIGNIFICANT CHANGE UP (ref 0–0)
PLATELET # BLD AUTO: 315 K/UL — SIGNIFICANT CHANGE UP (ref 150–400)
POTASSIUM SERPL-MCNC: 3.8 MMOL/L — SIGNIFICANT CHANGE UP (ref 3.5–5.3)
POTASSIUM SERPL-SCNC: 3.8 MMOL/L — SIGNIFICANT CHANGE UP (ref 3.5–5.3)
PROT SERPL-MCNC: 7.5 GM/DL — SIGNIFICANT CHANGE UP (ref 6–8.3)
RBC # BLD: 4 M/UL — SIGNIFICANT CHANGE UP (ref 3.8–5.2)
RBC # FLD: 14.9 % — HIGH (ref 10.3–14.5)
SODIUM SERPL-SCNC: 136 MMOL/L — SIGNIFICANT CHANGE UP (ref 135–145)
WBC # BLD: 11.02 K/UL — HIGH (ref 3.8–10.5)
WBC # FLD AUTO: 11.02 K/UL — HIGH (ref 3.8–10.5)

## 2018-06-19 RX ORDER — SODIUM CHLORIDE 9 MG/ML
1000 INJECTION, SOLUTION INTRAVENOUS
Qty: 0 | Refills: 0 | Status: DISCONTINUED | OUTPATIENT
Start: 2018-06-19 | End: 2018-06-22

## 2018-06-19 RX ORDER — SODIUM CHLORIDE 9 MG/ML
1000 INJECTION, SOLUTION INTRAVENOUS ONCE
Qty: 0 | Refills: 0 | Status: DISCONTINUED | OUTPATIENT
Start: 2018-06-19 | End: 2018-06-22

## 2018-06-19 RX ORDER — CITRIC ACID/SODIUM CITRATE 300-500 MG
15 SOLUTION, ORAL ORAL EVERY 4 HOURS
Qty: 0 | Refills: 0 | Status: DISCONTINUED | OUTPATIENT
Start: 2018-06-19 | End: 2018-06-22

## 2018-06-19 RX ORDER — OXYTOCIN 10 UNIT/ML
333.33 VIAL (ML) INJECTION
Qty: 20 | Refills: 0 | Status: COMPLETED | OUTPATIENT
Start: 2018-06-19

## 2018-06-20 LAB
ALLERGY+IMMUNOLOGY DIAG STUDY NOTE: SIGNIFICANT CHANGE UP
LDH SERPL L TO P-CCNC: 128 U/L — SIGNIFICANT CHANGE UP (ref 84–241)
T PALLIDUM AB TITR SER: NEGATIVE — SIGNIFICANT CHANGE UP
URATE SERPL-MCNC: 4.5 MG/DL — SIGNIFICANT CHANGE UP (ref 2.5–7)

## 2018-06-20 RX ORDER — BUTORPHANOL TARTRATE 2 MG/ML
2 INJECTION, SOLUTION INTRAMUSCULAR; INTRAVENOUS ONCE
Qty: 0 | Refills: 0 | Status: DISCONTINUED | OUTPATIENT
Start: 2018-06-20 | End: 2018-06-20

## 2018-06-20 RX ORDER — LABETALOL HCL 100 MG
100 TABLET ORAL DAILY
Qty: 0 | Refills: 0 | Status: DISCONTINUED | OUTPATIENT
Start: 2018-06-20 | End: 2018-06-22

## 2018-06-20 RX ORDER — ASPIRIN/CALCIUM CARB/MAGNESIUM 324 MG
81 TABLET ORAL DAILY
Qty: 0 | Refills: 0 | Status: DISCONTINUED | OUTPATIENT
Start: 2018-06-20 | End: 2018-06-25

## 2018-06-20 RX ADMIN — Medication 100 MILLIGRAM(S): at 19:33

## 2018-06-20 RX ADMIN — BUTORPHANOL TARTRATE 2 MILLIGRAM(S): 2 INJECTION, SOLUTION INTRAMUSCULAR; INTRAVENOUS at 18:33

## 2018-06-21 DIAGNOSIS — O47.9 FALSE LABOR, UNSPECIFIED: ICD-10-CM

## 2018-06-21 RX ORDER — OXYTOCIN 10 UNIT/ML
2 VIAL (ML) INJECTION
Qty: 30 | Refills: 0 | Status: DISCONTINUED | OUTPATIENT
Start: 2018-06-21 | End: 2018-06-22

## 2018-06-21 RX ORDER — BUTORPHANOL TARTRATE 2 MG/ML
2 INJECTION, SOLUTION INTRAMUSCULAR; INTRAVENOUS ONCE
Qty: 0 | Refills: 0 | Status: DISCONTINUED | OUTPATIENT
Start: 2018-06-21 | End: 2018-06-21

## 2018-06-21 RX ADMIN — Medication 100 MILLIGRAM(S): at 19:40

## 2018-06-21 RX ADMIN — Medication 2 MILLIUNIT(S)/MIN: at 07:00

## 2018-06-21 RX ADMIN — BUTORPHANOL TARTRATE 2 MILLIGRAM(S): 2 INJECTION, SOLUTION INTRAMUSCULAR; INTRAVENOUS at 05:45

## 2018-06-21 RX ADMIN — BUTORPHANOL TARTRATE 2 MILLIGRAM(S): 2 INJECTION, SOLUTION INTRAMUSCULAR; INTRAVENOUS at 06:21

## 2018-06-21 RX ADMIN — SODIUM CHLORIDE 125 MILLILITER(S): 9 INJECTION, SOLUTION INTRAVENOUS at 13:41

## 2018-06-21 RX ADMIN — SODIUM CHLORIDE 125 MILLILITER(S): 9 INJECTION, SOLUTION INTRAVENOUS at 18:56

## 2018-06-22 PROCEDURE — 59510 CESAREAN DELIVERY: CPT | Mod: U9

## 2018-06-22 RX ORDER — HYDROMORPHONE HYDROCHLORIDE 2 MG/ML
0.5 INJECTION INTRAMUSCULAR; INTRAVENOUS; SUBCUTANEOUS
Qty: 0 | Refills: 0 | Status: DISCONTINUED | OUTPATIENT
Start: 2018-06-22 | End: 2018-06-25

## 2018-06-22 RX ORDER — AZITHROMYCIN 500 MG/1
500 TABLET, FILM COATED ORAL ONCE
Qty: 0 | Refills: 0 | Status: COMPLETED | OUTPATIENT
Start: 2018-06-22 | End: 2018-06-22

## 2018-06-22 RX ORDER — DEXAMETHASONE 0.5 MG/5ML
4 ELIXIR ORAL EVERY 6 HOURS
Qty: 0 | Refills: 0 | Status: DISCONTINUED | OUTPATIENT
Start: 2018-06-22 | End: 2018-06-25

## 2018-06-22 RX ORDER — CEFAZOLIN SODIUM 1 G
2000 VIAL (EA) INJECTION ONCE
Qty: 0 | Refills: 0 | Status: COMPLETED | OUTPATIENT
Start: 2018-06-22 | End: 2018-06-22

## 2018-06-22 RX ORDER — ACETAMINOPHEN 500 MG
1000 TABLET ORAL ONCE
Qty: 0 | Refills: 0 | Status: COMPLETED | OUTPATIENT
Start: 2018-06-22 | End: 2018-06-22

## 2018-06-22 RX ORDER — OXYCODONE AND ACETAMINOPHEN 5; 325 MG/1; MG/1
1 TABLET ORAL
Qty: 0 | Refills: 0 | Status: DISCONTINUED | OUTPATIENT
Start: 2018-06-22 | End: 2018-06-25

## 2018-06-22 RX ORDER — IBUPROFEN 200 MG
600 TABLET ORAL EVERY 6 HOURS
Qty: 0 | Refills: 0 | Status: DISCONTINUED | OUTPATIENT
Start: 2018-06-22 | End: 2018-06-25

## 2018-06-22 RX ORDER — OXYTOCIN 10 UNIT/ML
41.67 VIAL (ML) INJECTION
Qty: 20 | Refills: 0 | Status: DISCONTINUED | OUTPATIENT
Start: 2018-06-22 | End: 2018-06-22

## 2018-06-22 RX ORDER — SODIUM CHLORIDE 9 MG/ML
1000 INJECTION, SOLUTION INTRAVENOUS
Qty: 0 | Refills: 0 | Status: DISCONTINUED | OUTPATIENT
Start: 2018-06-22 | End: 2018-06-25

## 2018-06-22 RX ORDER — SIMETHICONE 80 MG/1
80 TABLET, CHEWABLE ORAL EVERY 4 HOURS
Qty: 0 | Refills: 0 | Status: DISCONTINUED | OUTPATIENT
Start: 2018-06-22 | End: 2018-06-25

## 2018-06-22 RX ORDER — ACETAMINOPHEN 500 MG
650 TABLET ORAL EVERY 6 HOURS
Qty: 0 | Refills: 0 | Status: DISCONTINUED | OUTPATIENT
Start: 2018-06-22 | End: 2018-06-25

## 2018-06-22 RX ORDER — LANOLIN
1 OINTMENT (GRAM) TOPICAL
Qty: 0 | Refills: 0 | Status: DISCONTINUED | OUTPATIENT
Start: 2018-06-22 | End: 2018-06-25

## 2018-06-22 RX ORDER — TETANUS TOXOID, REDUCED DIPHTHERIA TOXOID AND ACELLULAR PERTUSSIS VACCINE, ADSORBED 5; 2.5; 8; 8; 2.5 [IU]/.5ML; [IU]/.5ML; UG/.5ML; UG/.5ML; UG/.5ML
0.5 SUSPENSION INTRAMUSCULAR ONCE
Qty: 0 | Refills: 0 | Status: DISCONTINUED | OUTPATIENT
Start: 2018-06-22 | End: 2018-06-25

## 2018-06-22 RX ORDER — HYDROMORPHONE HYDROCHLORIDE 2 MG/ML
1 INJECTION INTRAMUSCULAR; INTRAVENOUS; SUBCUTANEOUS
Qty: 0 | Refills: 0 | Status: DISCONTINUED | OUTPATIENT
Start: 2018-06-22 | End: 2018-06-25

## 2018-06-22 RX ORDER — MORPHINE SULFATE 50 MG/1
5 CAPSULE, EXTENDED RELEASE ORAL ONCE
Qty: 0 | Refills: 0 | Status: DISCONTINUED | OUTPATIENT
Start: 2018-06-22 | End: 2018-06-25

## 2018-06-22 RX ORDER — ONDANSETRON 8 MG/1
4 TABLET, FILM COATED ORAL EVERY 6 HOURS
Qty: 0 | Refills: 0 | Status: DISCONTINUED | OUTPATIENT
Start: 2018-06-22 | End: 2018-06-25

## 2018-06-22 RX ORDER — OXYCODONE HYDROCHLORIDE 5 MG/1
10 TABLET ORAL
Qty: 0 | Refills: 0 | Status: DISCONTINUED | OUTPATIENT
Start: 2018-06-22 | End: 2018-06-25

## 2018-06-22 RX ORDER — HEPARIN SODIUM 5000 [USP'U]/ML
5000 INJECTION INTRAVENOUS; SUBCUTANEOUS EVERY 12 HOURS
Qty: 0 | Refills: 0 | Status: DISCONTINUED | OUTPATIENT
Start: 2018-06-22 | End: 2018-06-25

## 2018-06-22 RX ORDER — OXYTOCIN 10 UNIT/ML
333.33 VIAL (ML) INJECTION
Qty: 20 | Refills: 0 | Status: DISCONTINUED | OUTPATIENT
Start: 2018-06-22 | End: 2018-06-22

## 2018-06-22 RX ORDER — LABETALOL HCL 100 MG
100 TABLET ORAL DAILY
Qty: 0 | Refills: 0 | Status: DISCONTINUED | OUTPATIENT
Start: 2018-06-22 | End: 2018-06-22

## 2018-06-22 RX ORDER — NALOXONE HYDROCHLORIDE 4 MG/.1ML
0.1 SPRAY NASAL
Qty: 0 | Refills: 0 | Status: DISCONTINUED | OUTPATIENT
Start: 2018-06-22 | End: 2018-06-25

## 2018-06-22 RX ORDER — OXYCODONE AND ACETAMINOPHEN 5; 325 MG/1; MG/1
2 TABLET ORAL EVERY 6 HOURS
Qty: 0 | Refills: 0 | Status: DISCONTINUED | OUTPATIENT
Start: 2018-06-22 | End: 2018-06-25

## 2018-06-22 RX ORDER — FERROUS SULFATE 325(65) MG
325 TABLET ORAL DAILY
Qty: 0 | Refills: 0 | Status: DISCONTINUED | OUTPATIENT
Start: 2018-06-22 | End: 2018-06-25

## 2018-06-22 RX ORDER — OXYTOCIN 10 UNIT/ML
41.67 VIAL (ML) INJECTION
Qty: 20 | Refills: 0 | Status: DISCONTINUED | OUTPATIENT
Start: 2018-06-22 | End: 2018-06-25

## 2018-06-22 RX ORDER — GLYCERIN ADULT
1 SUPPOSITORY, RECTAL RECTAL AT BEDTIME
Qty: 0 | Refills: 0 | Status: DISCONTINUED | OUTPATIENT
Start: 2018-06-22 | End: 2018-06-25

## 2018-06-22 RX ORDER — DIPHENHYDRAMINE HCL 50 MG
25 CAPSULE ORAL EVERY 4 HOURS
Qty: 0 | Refills: 0 | Status: DISCONTINUED | OUTPATIENT
Start: 2018-06-22 | End: 2018-06-25

## 2018-06-22 RX ORDER — OXYTOCIN 10 UNIT/ML
333.33 VIAL (ML) INJECTION
Qty: 20 | Refills: 0 | Status: DISCONTINUED | OUTPATIENT
Start: 2018-06-22 | End: 2018-06-25

## 2018-06-22 RX ORDER — OXYCODONE HYDROCHLORIDE 5 MG/1
5 TABLET ORAL
Qty: 0 | Refills: 0 | Status: DISCONTINUED | OUTPATIENT
Start: 2018-06-22 | End: 2018-06-25

## 2018-06-22 RX ORDER — DOCUSATE SODIUM 100 MG
100 CAPSULE ORAL
Qty: 0 | Refills: 0 | Status: DISCONTINUED | OUTPATIENT
Start: 2018-06-22 | End: 2018-06-25

## 2018-06-22 RX ORDER — DIPHENHYDRAMINE HCL 50 MG
25 CAPSULE ORAL EVERY 6 HOURS
Qty: 0 | Refills: 0 | Status: DISCONTINUED | OUTPATIENT
Start: 2018-06-22 | End: 2018-06-25

## 2018-06-22 RX ORDER — LABETALOL HCL 100 MG
100 TABLET ORAL DAILY
Qty: 0 | Refills: 0 | Status: DISCONTINUED | OUTPATIENT
Start: 2018-06-23 | End: 2018-06-25

## 2018-06-22 RX ADMIN — ONDANSETRON 4 MILLIGRAM(S): 8 TABLET, FILM COATED ORAL at 17:11

## 2018-06-22 RX ADMIN — Medication 1000 MILLIGRAM(S): at 08:54

## 2018-06-22 RX ADMIN — Medication 100 MILLIGRAM(S): at 06:05

## 2018-06-22 RX ADMIN — HEPARIN SODIUM 5000 UNIT(S): 5000 INJECTION INTRAVENOUS; SUBCUTANEOUS at 17:54

## 2018-06-22 RX ADMIN — SODIUM CHLORIDE 125 MILLILITER(S): 9 INJECTION, SOLUTION INTRAVENOUS at 04:04

## 2018-06-22 RX ADMIN — Medication 81 MILLIGRAM(S): at 13:13

## 2018-06-22 RX ADMIN — Medication 400 MILLIGRAM(S): at 08:38

## 2018-06-22 RX ADMIN — Medication 125 MILLIUNIT(S)/MIN: at 08:44

## 2018-06-22 RX ADMIN — AZITHROMYCIN 255 MILLIGRAM(S): 500 TABLET, FILM COATED ORAL at 05:36

## 2018-06-22 RX ADMIN — Medication 1000 MILLIUNIT(S)/MIN: at 07:35

## 2018-06-22 NOTE — CHART NOTE - NSCHARTNOTEFT_GEN_A_CORE
31yo  on POD#0 status post primary  section doing well. Postop pain adequately controlled with po meds. Denies N/V.     Plan:   - continue routine pp/postop care  - continue breastfeeding  - baby boy-- desires circumcision, pending Peds clearance  - DVT prophylaxis-- continue SCDs and heparin SC bid    AVTAR Davison 33yo  on POD#0 status post primary  section doing well. Postop pain adequately controlled with po meds. Denies N/V.     Plan:   - continue routine pp/postop care  - continue breastfeeding  - baby boy-- desires circumcision, pending Peds clearance  - DVT prophylaxis-- continue SCDs and heparin SC bid  - f/u CBC postop    G Laney

## 2018-06-23 LAB
BASOPHILS # BLD AUTO: 0.02 K/UL — SIGNIFICANT CHANGE UP (ref 0–0.2)
BASOPHILS NFR BLD AUTO: 0.2 % — SIGNIFICANT CHANGE UP (ref 0–2)
EOSINOPHIL # BLD AUTO: 0.05 K/UL — SIGNIFICANT CHANGE UP (ref 0–0.5)
EOSINOPHIL NFR BLD AUTO: 0.4 % — SIGNIFICANT CHANGE UP (ref 0–6)
HCT VFR BLD CALC: 30.2 % — LOW (ref 34.5–45)
HGB BLD-MCNC: 9.7 G/DL — LOW (ref 11.5–15.5)
IMM GRANULOCYTES NFR BLD AUTO: 0.4 % — SIGNIFICANT CHANGE UP (ref 0–1.5)
LYMPHOCYTES # BLD AUTO: 1.02 K/UL — SIGNIFICANT CHANGE UP (ref 1–3.3)
LYMPHOCYTES # BLD AUTO: 8.6 % — LOW (ref 13–44)
MCHC RBC-ENTMCNC: 26.6 PG — LOW (ref 27–34)
MCHC RBC-ENTMCNC: 32.1 GM/DL — SIGNIFICANT CHANGE UP (ref 32–36)
MCV RBC AUTO: 83 FL — SIGNIFICANT CHANGE UP (ref 80–100)
MONOCYTES # BLD AUTO: 0.7 K/UL — SIGNIFICANT CHANGE UP (ref 0–0.9)
MONOCYTES NFR BLD AUTO: 5.9 % — SIGNIFICANT CHANGE UP (ref 2–14)
NEUTROPHILS # BLD AUTO: 9.97 K/UL — HIGH (ref 1.8–7.4)
NEUTROPHILS NFR BLD AUTO: 84.5 % — HIGH (ref 43–77)
NRBC # BLD: 0 /100 WBCS — SIGNIFICANT CHANGE UP (ref 0–0)
PLATELET # BLD AUTO: 246 K/UL — SIGNIFICANT CHANGE UP (ref 150–400)
RBC # BLD: 3.64 M/UL — LOW (ref 3.8–5.2)
RBC # FLD: 15.1 % — HIGH (ref 10.3–14.5)
WBC # BLD: 11.81 K/UL — HIGH (ref 3.8–10.5)
WBC # FLD AUTO: 11.81 K/UL — HIGH (ref 3.8–10.5)

## 2018-06-23 RX ADMIN — Medication 600 MILLIGRAM(S): at 18:47

## 2018-06-23 RX ADMIN — Medication 100 MILLIGRAM(S): at 06:07

## 2018-06-23 RX ADMIN — Medication 325 MILLIGRAM(S): at 12:15

## 2018-06-23 RX ADMIN — HEPARIN SODIUM 5000 UNIT(S): 5000 INJECTION INTRAVENOUS; SUBCUTANEOUS at 05:56

## 2018-06-23 RX ADMIN — HEPARIN SODIUM 5000 UNIT(S): 5000 INJECTION INTRAVENOUS; SUBCUTANEOUS at 18:48

## 2018-06-23 RX ADMIN — Medication 1 TABLET(S): at 12:15

## 2018-06-23 RX ADMIN — Medication 600 MILLIGRAM(S): at 05:56

## 2018-06-23 RX ADMIN — Medication 81 MILLIGRAM(S): at 12:15

## 2018-06-23 RX ADMIN — OXYCODONE AND ACETAMINOPHEN 1 TABLET(S): 5; 325 TABLET ORAL at 14:24

## 2018-06-23 RX ADMIN — Medication 600 MILLIGRAM(S): at 12:15

## 2018-06-23 RX ADMIN — Medication 100 MILLIGRAM(S): at 12:15

## 2018-06-23 RX ADMIN — SIMETHICONE 80 MILLIGRAM(S): 80 TABLET, CHEWABLE ORAL at 14:24

## 2018-06-23 RX ADMIN — OXYCODONE AND ACETAMINOPHEN 1 TABLET(S): 5; 325 TABLET ORAL at 05:56

## 2018-06-23 NOTE — PROGRESS NOTE ADULT - SUBJECTIVE AND OBJECTIVE BOX
Postpartum Note,  Section  Patient is a 32y woman , POD 1     Subjective: Patient seen and examined at bedside. No acute events overnight. Pain well controlled with current pain regimen. She is ambulating well and tolerating PO diet/fluids. She reports minimal bleeding/discharge at incision site. She is voiding well and reports flatus / BM. Reports breastfeeding without difficulties. Denies fever, headache, changes in vision, nausea, vomiting. Otherwise, patient feels well without additional complaints. BP has rafael stable within the past 24 hours, SBP <140 and DBP <90.    Physical exam:    Vital Signs Last 24 Hrs  T(C): 37 (2018 08:19), Max: 37 (2018 08:19)  T(F): 98.6 (2018 08:19), Max: 98.6 (2018 08:19)  HR: 87 (2018 08:19) (76 - 98)  BP: 116/62 (2018 08:19) (115/62 - 139/89)  BP(mean): 67 (2018 23:51) (67 - 67)  RR: 16 (2018 08:19) (16 - 18)  SpO2: 95% (2018 08:19) (95% - 100%)    Gen: NAD  Breast: Soft, nontender, not engorged  Cardio: S1,S2 no murmurs  Lungs: CTA B/L, no wheezing  Abdomen: Soft, nontender, no distension, firm uterine fundus at umbilicus.  Incision: Clean, dry, and intact  Ext: No calf tenderness bilaterally    LABS:                        9.7    11.81 )-----------( 246      ( 2018 06:59 )             30.2         Allergies    Chloraprep (Rash)  No Known Drug Allergies    Intolerances      MEDICATIONS  (STANDING):  aspirin  chewable 81 milliGRAM(s) Oral daily  diphtheria/tetanus/pertussis (acellular) Vaccine (ADAcel) 0.5 milliLiter(s) IntraMuscular once  ferrous    sulfate 325 milliGRAM(s) Oral daily  heparin  Injectable 5000 Unit(s) SubCutaneous every 12 hours  labetalol 100 milliGRAM(s) Oral daily  lactated ringers. 1000 milliLiter(s) (125 mL/Hr) IV Continuous <Continuous>  lactated ringers. 1000 milliLiter(s) (125 mL/Hr) IV Continuous <Continuous>  morphine PF Epidural 5 milliGRAM(s) Epidural once  oxytocin Infusion 333.333 milliUNIT(s)/Min (1000 mL/Hr) IV Continuous <Continuous>  oxytocin Infusion 41.667 milliUNIT(s)/Min (125 mL/Hr) IV Continuous <Continuous>  prenatal multivitamin 1 Tablet(s) Oral daily    MEDICATIONS  (PRN):  acetaminophen   Tablet 650 milliGRAM(s) Oral every 6 hours PRN For Temp greater than 38.5 C (101.3 F)  dexamethasone  Injectable 4 milliGRAM(s) IV Push every 6 hours PRN Nausea, IF ondansetron is ineffective after 30 - 60 minutes  diphenhydrAMINE   Capsule 25 milliGRAM(s) Oral every 6 hours PRN Itching  diphenhydrAMINE   Injectable 25 milliGRAM(s) IV Push every 4 hours PRN Pruritus  docusate sodium 100 milliGRAM(s) Oral two times a day PRN Stool Softening  glycerin Suppository - Adult 1 Suppository(s) Rectal at bedtime PRN Constipation  HYDROmorphone  Injectable 1 milliGRAM(s) IV Push every 3 hours PRN Severe Pain  HYDROmorphone  Injectable 0.5 milliGRAM(s) IV Push every 3 hours PRN Moderate Pain  ibuprofen  Tablet 600 milliGRAM(s) Oral every 6 hours PRN Mild pain or headache  lanolin Ointment 1 Application(s) Topical every 3 hours PRN Sore Nipples  naloxone Injectable 0.1 milliGRAM(s) IV Push every 3 minutes PRN For ANY of the following changes in patient status:  A. RR LESS THAN 10 breaths per minute, B. Oxygen saturation LESS THAN 90%, C. Sedation score of 6  ondansetron Injectable 4 milliGRAM(s) IV Push every 6 hours PRN Nausea  oxyCODONE    5 mG/acetaminophen 325 mG 1 Tablet(s) Oral every 3 hours PRN Moderate Pain  oxyCODONE    5 mG/acetaminophen 325 mG 2 Tablet(s) Oral every 6 hours PRN Severe Pain  oxyCODONE    IR 5 milliGRAM(s) Oral every 3 hours PRN Mild Pain  oxyCODONE    IR 10 milliGRAM(s) Oral every 3 hours PRN Moderate Pain  simethicone 80 milliGRAM(s) Chew every 4 hours PRN Gas        Assessment and Plan  -s/p C/S POD 1  -Routine post-op care.  -Pain well controlled, continue current pain regimen.  -Encouraged ambulation.  -Encouraged PO diet/fluids.  -Encouraged breastfeeding.

## 2018-06-24 RX ORDER — BACITRACIN ZINC 500 UNIT/G
1 OINTMENT IN PACKET (EA) TOPICAL
Qty: 0 | Refills: 0 | Status: DISCONTINUED | OUTPATIENT
Start: 2018-06-24 | End: 2018-06-25

## 2018-06-24 RX ADMIN — Medication 100 MILLIGRAM(S): at 06:55

## 2018-06-24 RX ADMIN — Medication 600 MILLIGRAM(S): at 23:01

## 2018-06-24 RX ADMIN — Medication 600 MILLIGRAM(S): at 16:55

## 2018-06-24 RX ADMIN — HEPARIN SODIUM 5000 UNIT(S): 5000 INJECTION INTRAVENOUS; SUBCUTANEOUS at 06:55

## 2018-06-24 RX ADMIN — OXYCODONE AND ACETAMINOPHEN 1 TABLET(S): 5; 325 TABLET ORAL at 04:30

## 2018-06-24 RX ADMIN — Medication 1 TABLET(S): at 12:11

## 2018-06-24 RX ADMIN — Medication 600 MILLIGRAM(S): at 00:51

## 2018-06-24 RX ADMIN — Medication 325 MILLIGRAM(S): at 12:10

## 2018-06-24 RX ADMIN — Medication 81 MILLIGRAM(S): at 12:10

## 2018-06-24 RX ADMIN — HEPARIN SODIUM 5000 UNIT(S): 5000 INJECTION INTRAVENOUS; SUBCUTANEOUS at 18:04

## 2018-06-24 RX ADMIN — OXYCODONE AND ACETAMINOPHEN 1 TABLET(S): 5; 325 TABLET ORAL at 03:47

## 2018-06-24 RX ADMIN — Medication 600 MILLIGRAM(S): at 08:31

## 2018-06-24 RX ADMIN — Medication 600 MILLIGRAM(S): at 01:10

## 2018-06-24 NOTE — PROGRESS NOTE ADULT - SUBJECTIVE AND OBJECTIVE BOX
PO # 2  Pt without complaints +flatus -BM  Vital Signs Last 24 Hrs  T(C): 37 (24 Jun 2018 06:23), Max: 37 (24 Jun 2018 06:23)  T(F): 98.6 (24 Jun 2018 06:23), Max: 98.6 (24 Jun 2018 06:23)  HR: 88 (24 Jun 2018 06:23) (76 - 94)  BP: 121/63 (24 Jun 2018 06:23) (110/66 - 126/76)  BP(mean): --  RR: 16 (24 Jun 2018 06:23) (16 - 17)  SpO2: 98% (24 Jun 2018 06:23) (98% - 100%)    abdomen soft, utx firm/NT. incision C/D/I   light blood  labs                        9.7    11.81 )-----------( 246      ( 23 Jun 2018 06:59 )             30.2     A/P s/p C/S doing well. Follow per routine.

## 2018-06-25 ENCOUNTER — RX RENEWAL (OUTPATIENT)
Age: 33
End: 2018-06-25

## 2018-06-25 ENCOUNTER — TRANSCRIPTION ENCOUNTER (OUTPATIENT)
Age: 33
End: 2018-06-25

## 2018-06-25 VITALS
HEART RATE: 104 BPM | RESPIRATION RATE: 18 BRPM | DIASTOLIC BLOOD PRESSURE: 69 MMHG | SYSTOLIC BLOOD PRESSURE: 138 MMHG | TEMPERATURE: 98 F | OXYGEN SATURATION: 99 %

## 2018-06-25 RX ORDER — OXYCODONE HYDROCHLORIDE 5 MG/1
1 TABLET ORAL
Qty: 20 | Refills: 0 | OUTPATIENT
Start: 2018-06-25

## 2018-06-25 RX ORDER — ACETAMINOPHEN 500 MG
3 TABLET ORAL
Qty: 20 | Refills: 0 | OUTPATIENT
Start: 2018-06-25

## 2018-06-25 RX ORDER — IBUPROFEN 200 MG
1 TABLET ORAL
Qty: 0 | Refills: 0 | DISCHARGE
Start: 2018-06-25

## 2018-06-25 RX ORDER — LABETALOL HCL 100 MG
1 TABLET ORAL
Qty: 0 | Refills: 0 | COMMUNITY
Start: 2018-06-25

## 2018-06-25 RX ADMIN — HEPARIN SODIUM 5000 UNIT(S): 5000 INJECTION INTRAVENOUS; SUBCUTANEOUS at 06:14

## 2018-06-25 RX ADMIN — Medication 1 APPLICATION(S): at 07:01

## 2018-06-25 RX ADMIN — Medication 100 MILLIGRAM(S): at 06:29

## 2018-06-25 RX ADMIN — Medication 600 MILLIGRAM(S): at 07:00

## 2018-06-25 RX ADMIN — Medication 100 MILLIGRAM(S): at 06:14

## 2018-06-25 RX ADMIN — Medication 600 MILLIGRAM(S): at 00:00

## 2018-06-25 RX ADMIN — Medication 600 MILLIGRAM(S): at 06:14

## 2018-06-25 NOTE — DISCHARGE NOTE OB - CARE PLAN
Principal Discharge DX:	 delivery delivered  Goal:	Return to usual activities  Assessment and plan of treatment:	regular diet, avoid intercourse, no heavy lifting

## 2018-06-25 NOTE — DISCHARGE NOTE OB - HOSPITAL COURSE
31yo  on POD#3 status post primary  section doing well. Tolerating diet and ambulation without difficulties. Normal lochia.

## 2018-06-25 NOTE — DISCHARGE NOTE OB - PATIENT PORTAL LINK FT
You can access the [facility] Patient Portal, offered by [facility], by registering with the following website: [email or web address]

## 2018-06-25 NOTE — DISCHARGE NOTE OB - MEDICATION SUMMARY - MEDICATIONS TO TAKE
I will START or STAY ON the medications listed below when I get home from the hospital:    ibuprofen 600 mg oral tablet  -- 1 tab(s) by mouth every 6 hours, As needed, Mild pain or headache  -- Indication: For Postoperative pain    oxyCODONE 5 mg oral tablet  -- 1 tab(s) by mouth every 4 to 6 hours -for moderate pain MDD:6   -- Caution federal law prohibits the transfer of this drug to any person other  than the person for whom it was prescribed.  It is very important that you take or use this exactly as directed.  Do not skip doses or discontinue unless directed by your doctor.  May cause drowsiness.  Alcohol may intensify this effect.  Use care when operating dangerous machinery.  This prescription cannot be refilled.  Using more of this medication than prescribed may cause serious breathing problems.    -- Indication: For Postoperative pain    Tylenol 325 mg oral tablet  -- 3 tab(s) by mouth every 4 hours   -- This product contains acetaminophen.  Do not use  with any other product containing acetaminophen to prevent possible liver damage.    -- Indication: For Postoperative pain    labetalol 100 mg oral tablet  -- 1 tab(s) by mouth once a day  -- Indication: For Chronic hypertension

## 2018-06-28 DIAGNOSIS — Z86.19 PERSONAL HISTORY OF OTHER INFECTIOUS AND PARASITIC DISEASES: ICD-10-CM

## 2018-06-28 DIAGNOSIS — Z3A.40 40 WEEKS GESTATION OF PREGNANCY: ICD-10-CM

## 2018-06-28 DIAGNOSIS — Z34.83 ENCOUNTER FOR SUPERVISION OF OTHER NORMAL PREGNANCY, THIRD TRIMESTER: ICD-10-CM

## 2018-06-28 DIAGNOSIS — Z79.82 LONG TERM (CURRENT) USE OF ASPIRIN: ICD-10-CM

## 2018-07-10 ENCOUNTER — APPOINTMENT (OUTPATIENT)
Dept: OBGYN | Facility: CLINIC | Age: 33
End: 2018-07-10
Payer: COMMERCIAL

## 2018-07-10 VITALS
DIASTOLIC BLOOD PRESSURE: 80 MMHG | RESPIRATION RATE: 16 BRPM | BODY MASS INDEX: 39.87 KG/M2 | TEMPERATURE: 99.2 F | HEIGHT: 67 IN | WEIGHT: 254 LBS | SYSTOLIC BLOOD PRESSURE: 110 MMHG

## 2018-07-10 PROCEDURE — 0503F POSTPARTUM CARE VISIT: CPT

## 2018-07-22 PROBLEM — Z80.0 FAMILY HISTORY OF COLON CANCER: Status: ACTIVE | Noted: 2017-10-09

## 2018-07-24 ENCOUNTER — APPOINTMENT (OUTPATIENT)
Dept: OBGYN | Facility: CLINIC | Age: 33
End: 2018-07-24
Payer: COMMERCIAL

## 2018-07-24 VITALS
WEIGHT: 254 LBS | RESPIRATION RATE: 16 BRPM | SYSTOLIC BLOOD PRESSURE: 118 MMHG | DIASTOLIC BLOOD PRESSURE: 80 MMHG | BODY MASS INDEX: 39.87 KG/M2 | HEIGHT: 67 IN | TEMPERATURE: 99.7 F

## 2018-07-24 PROCEDURE — 0503F POSTPARTUM CARE VISIT: CPT

## 2018-08-07 ENCOUNTER — APPOINTMENT (OUTPATIENT)
Dept: OBGYN | Facility: CLINIC | Age: 33
End: 2018-08-07
Payer: COMMERCIAL

## 2018-08-07 LAB
HCG UR QL: NEGATIVE
QUALITY CONTROL: YES

## 2018-08-07 PROCEDURE — 76830 TRANSVAGINAL US NON-OB: CPT

## 2018-08-07 PROCEDURE — 58300 INSERT INTRAUTERINE DEVICE: CPT

## 2018-08-07 PROCEDURE — 81025 URINE PREGNANCY TEST: CPT

## 2018-09-04 ENCOUNTER — APPOINTMENT (OUTPATIENT)
Dept: OBGYN | Facility: CLINIC | Age: 33
End: 2018-09-04
Payer: COMMERCIAL

## 2018-09-04 VITALS
DIASTOLIC BLOOD PRESSURE: 70 MMHG | RESPIRATION RATE: 18 BRPM | BODY MASS INDEX: 39.87 KG/M2 | SYSTOLIC BLOOD PRESSURE: 118 MMHG | TEMPERATURE: 98.5 F | WEIGHT: 254 LBS | HEIGHT: 67 IN

## 2018-09-04 PROCEDURE — 76830 TRANSVAGINAL US NON-OB: CPT

## 2018-09-04 PROCEDURE — 99213 OFFICE O/P EST LOW 20 MIN: CPT

## 2018-09-06 LAB
CANDIDA VAG CYTO: NOT DETECTED
G VAGINALIS+PREV SP MTYP VAG QL MICRO: DETECTED
T VAGINALIS VAG QL WET PREP: NOT DETECTED

## 2018-09-09 ENCOUNTER — TRANSCRIPTION ENCOUNTER (OUTPATIENT)
Age: 33
End: 2018-09-09

## 2018-09-13 ENCOUNTER — EMERGENCY (EMERGENCY)
Facility: HOSPITAL | Age: 33
LOS: 0 days | Discharge: HOME | End: 2018-09-13
Attending: EMERGENCY MEDICINE | Admitting: EMERGENCY MEDICINE
Payer: COMMERCIAL

## 2018-09-13 VITALS — TEMPERATURE: 99 F

## 2018-09-13 VITALS — HEIGHT: 67 IN | WEIGHT: 253.97 LBS

## 2018-09-13 DIAGNOSIS — R05 COUGH: ICD-10-CM

## 2018-09-13 DIAGNOSIS — I10 ESSENTIAL (PRIMARY) HYPERTENSION: ICD-10-CM

## 2018-09-13 DIAGNOSIS — J18.9 PNEUMONIA, UNSPECIFIED ORGANISM: ICD-10-CM

## 2018-09-13 DIAGNOSIS — R50.9 FEVER, UNSPECIFIED: ICD-10-CM

## 2018-09-13 LAB
ALBUMIN SERPL ELPH-MCNC: 3.6 G/DL — SIGNIFICANT CHANGE UP (ref 3.3–5)
ALP SERPL-CCNC: 97 U/L — SIGNIFICANT CHANGE UP (ref 40–120)
ALT FLD-CCNC: 69 U/L — SIGNIFICANT CHANGE UP (ref 12–78)
ANION GAP SERPL CALC-SCNC: 8 MMOL/L — SIGNIFICANT CHANGE UP (ref 5–17)
APPEARANCE UR: CLEAR — SIGNIFICANT CHANGE UP
AST SERPL-CCNC: 30 U/L — SIGNIFICANT CHANGE UP (ref 15–37)
BACTERIA # UR AUTO: ABNORMAL
BASOPHILS # BLD AUTO: 0.07 K/UL — SIGNIFICANT CHANGE UP (ref 0–0.2)
BASOPHILS NFR BLD AUTO: 0.6 % — SIGNIFICANT CHANGE UP (ref 0–2)
BILIRUB SERPL-MCNC: 1.7 MG/DL — HIGH (ref 0.2–1.2)
BILIRUB UR-MCNC: NEGATIVE — SIGNIFICANT CHANGE UP
BUN SERPL-MCNC: 14 MG/DL — SIGNIFICANT CHANGE UP (ref 7–23)
CALCIUM SERPL-MCNC: 8.4 MG/DL — LOW (ref 8.5–10.1)
CHLORIDE SERPL-SCNC: 106 MMOL/L — SIGNIFICANT CHANGE UP (ref 96–108)
CO2 SERPL-SCNC: 24 MMOL/L — SIGNIFICANT CHANGE UP (ref 22–31)
COLOR SPEC: YELLOW — SIGNIFICANT CHANGE UP
CREAT SERPL-MCNC: 0.8 MG/DL — SIGNIFICANT CHANGE UP (ref 0.5–1.3)
DIFF PNL FLD: ABNORMAL
EOSINOPHIL # BLD AUTO: 0.22 K/UL — SIGNIFICANT CHANGE UP (ref 0–0.5)
EOSINOPHIL NFR BLD AUTO: 1.8 % — SIGNIFICANT CHANGE UP (ref 0–6)
EPI CELLS # UR: SIGNIFICANT CHANGE UP
GLUCOSE SERPL-MCNC: 97 MG/DL — SIGNIFICANT CHANGE UP (ref 70–99)
GLUCOSE UR QL: NEGATIVE MG/DL — SIGNIFICANT CHANGE UP
GRAN CASTS # UR COMP ASSIST: ABNORMAL /LPF
HCT VFR BLD CALC: 34.9 % — SIGNIFICANT CHANGE UP (ref 34.5–45)
HGB BLD-MCNC: 11.2 G/DL — LOW (ref 11.5–15.5)
IMM GRANULOCYTES NFR BLD AUTO: 0.4 % — SIGNIFICANT CHANGE UP (ref 0–1.5)
KETONES UR-MCNC: NEGATIVE — SIGNIFICANT CHANGE UP
LEUKOCYTE ESTERASE UR-ACNC: NEGATIVE — SIGNIFICANT CHANGE UP
LYMPHOCYTES # BLD AUTO: 1.45 K/UL — SIGNIFICANT CHANGE UP (ref 1–3.3)
LYMPHOCYTES # BLD AUTO: 12 % — LOW (ref 13–44)
MCHC RBC-ENTMCNC: 25.4 PG — LOW (ref 27–34)
MCHC RBC-ENTMCNC: 32.1 GM/DL — SIGNIFICANT CHANGE UP (ref 32–36)
MCV RBC AUTO: 79.1 FL — LOW (ref 80–100)
MONOCYTES # BLD AUTO: 0.91 K/UL — HIGH (ref 0–0.9)
MONOCYTES NFR BLD AUTO: 7.5 % — SIGNIFICANT CHANGE UP (ref 2–14)
NEUTROPHILS # BLD AUTO: 9.37 K/UL — HIGH (ref 1.8–7.4)
NEUTROPHILS NFR BLD AUTO: 77.7 % — HIGH (ref 43–77)
NITRITE UR-MCNC: NEGATIVE — SIGNIFICANT CHANGE UP
NRBC # BLD: 0 /100 WBCS — SIGNIFICANT CHANGE UP (ref 0–0)
PH UR: 6 — SIGNIFICANT CHANGE UP (ref 5–8)
PLATELET # BLD AUTO: 357 K/UL — SIGNIFICANT CHANGE UP (ref 150–400)
POTASSIUM SERPL-MCNC: 3.8 MMOL/L — SIGNIFICANT CHANGE UP (ref 3.5–5.3)
POTASSIUM SERPL-SCNC: 3.8 MMOL/L — SIGNIFICANT CHANGE UP (ref 3.5–5.3)
PROT SERPL-MCNC: 7.9 GM/DL — SIGNIFICANT CHANGE UP (ref 6–8.3)
PROT UR-MCNC: 100 MG/DL
RBC # BLD: 4.41 M/UL — SIGNIFICANT CHANGE UP (ref 3.8–5.2)
RBC # FLD: 15.7 % — HIGH (ref 10.3–14.5)
RBC CASTS # UR COMP ASSIST: SIGNIFICANT CHANGE UP /HPF (ref 0–4)
SODIUM SERPL-SCNC: 138 MMOL/L — SIGNIFICANT CHANGE UP (ref 135–145)
SP GR SPEC: 1.01 — SIGNIFICANT CHANGE UP (ref 1.01–1.02)
UROBILINOGEN FLD QL: NEGATIVE MG/DL — SIGNIFICANT CHANGE UP
WBC # BLD: 12.07 K/UL — HIGH (ref 3.8–10.5)
WBC # FLD AUTO: 12.07 K/UL — HIGH (ref 3.8–10.5)
WBC UR QL: SIGNIFICANT CHANGE UP

## 2018-09-13 PROCEDURE — 71046 X-RAY EXAM CHEST 2 VIEWS: CPT | Mod: 26

## 2018-09-13 PROCEDURE — 99284 EMERGENCY DEPT VISIT MOD MDM: CPT

## 2018-09-13 PROCEDURE — 93010 ELECTROCARDIOGRAM REPORT: CPT

## 2018-09-13 PROCEDURE — 71275 CT ANGIOGRAPHY CHEST: CPT | Mod: 26

## 2018-09-13 RX ORDER — AZITHROMYCIN 500 MG/1
500 TABLET, FILM COATED ORAL ONCE
Qty: 0 | Refills: 0 | Status: COMPLETED | OUTPATIENT
Start: 2018-09-13 | End: 2018-09-13

## 2018-09-13 RX ORDER — AZITHROMYCIN 500 MG/1
250 TABLET, FILM COATED ORAL
Qty: 1250 | Refills: 0 | OUTPATIENT
Start: 2018-09-13 | End: 2018-09-17

## 2018-09-13 RX ORDER — SODIUM CHLORIDE 9 MG/ML
1000 INJECTION INTRAMUSCULAR; INTRAVENOUS; SUBCUTANEOUS ONCE
Qty: 0 | Refills: 0 | Status: COMPLETED | OUTPATIENT
Start: 2018-09-13 | End: 2018-09-13

## 2018-09-13 RX ADMIN — SODIUM CHLORIDE 1000 MILLILITER(S): 9 INJECTION INTRAMUSCULAR; INTRAVENOUS; SUBCUTANEOUS at 09:54

## 2018-09-13 RX ADMIN — SODIUM CHLORIDE 1000 MILLILITER(S): 9 INJECTION INTRAMUSCULAR; INTRAVENOUS; SUBCUTANEOUS at 08:54

## 2018-09-13 RX ADMIN — AZITHROMYCIN 500 MILLIGRAM(S): 500 TABLET, FILM COATED ORAL at 11:48

## 2018-09-13 NOTE — ED ADULT NURSE NOTE - CHIEF COMPLAINT QUOTE
Pt reports that she has been on BP meds for three years. Pt reports BP normal throughout pregnancy with an uneventful delivery in June.  Pt reports recently being started on different htn medication and an abx for "possible lung infection."  Pt reports recent cold like symptoms.  Pt reports that her BP was "140's over 100's" at home.

## 2018-09-13 NOTE — ED ADULT NURSE NOTE - NSSISCREENINGQ1_ED_A_ED
Pt belongings and clothing removed from pt and placed into bags. Pt given bed bath. Mepalex applied to sacrum. Will continue to monitor. Call bell within reach. Pt verbalized contract of [facility] with this RN at this time. No

## 2018-09-13 NOTE — ED PROVIDER NOTE - MEDICAL DECISION MAKING DETAILS
33 yo pt with cough and high blood pressure.  CT scan with possible pneumonia.  Pt instructed to keep log of bp and follow up with PMD.

## 2018-09-13 NOTE — ED PROVIDER NOTE - OBJECTIVE STATEMENT
31 y/o female with a PMHx of post-partum 12 weeks, delivery via  presents to the ED c/o cough. Pt notes URI sx started  and went to an urgent care where she was prescribed meds. Pt followed up with PCP Dr. Valencia 4 days ago after not feeling better and was noted to have high blood pressure. Pt was switched BP meds to Olmesartan. PCP also found concern for pt having peptic ulcer at the office, started on Augmentin. Pt check her BP at home today, in the 140s, and is continuing to cough, came to ED for eval. Pt also had a fever today of 100.8. No recent travel or sick contacts. No breast feeding. PCP Dr. Valencia

## 2018-09-13 NOTE — ED PROVIDER NOTE - CARE PLAN
Principal Discharge DX:	Pneumonia of right upper lobe due to infectious organism  Secondary Diagnosis:	Hypertension, unspecified type

## 2018-09-13 NOTE — ED ADULT TRIAGE NOTE - CHIEF COMPLAINT QUOTE
Pt reports that she has recently been started on htn medication (unsure what) and an abx for "possible lung infection."  Pt reports recent cold like symptoms.  Pt reports that her BP was "140's over 100's" at home. Pt reports that she has been on BP meds for three years. Pt reports BP normal throughout pregnancy with an uneventful delivery in June.  Pt reports recently being started on different htn medication and an abx for "possible lung infection."  Pt reports recent cold like symptoms.  Pt reports that her BP was "140's over 100's" at home.

## 2018-09-17 ENCOUNTER — APPOINTMENT (OUTPATIENT)
Dept: ULTRASOUND IMAGING | Facility: CLINIC | Age: 33
End: 2018-09-17

## 2018-09-17 ENCOUNTER — OUTPATIENT (OUTPATIENT)
Dept: OUTPATIENT SERVICES | Facility: HOSPITAL | Age: 33
LOS: 1 days | End: 2018-09-17
Payer: COMMERCIAL

## 2018-09-17 DIAGNOSIS — Z00.8 ENCOUNTER FOR OTHER GENERAL EXAMINATION: ICD-10-CM

## 2018-09-17 PROBLEM — I10 ESSENTIAL (PRIMARY) HYPERTENSION: Chronic | Status: ACTIVE | Noted: 2018-09-13

## 2018-09-17 PROCEDURE — 76700 US EXAM ABDOM COMPLETE: CPT | Mod: 26

## 2018-09-17 PROCEDURE — 76700 US EXAM ABDOM COMPLETE: CPT

## 2018-09-19 ENCOUNTER — INPATIENT (INPATIENT)
Facility: HOSPITAL | Age: 33
LOS: 2 days | Discharge: HOME | End: 2018-09-22
Attending: HOSPITALIST | Admitting: HOSPITALIST
Payer: COMMERCIAL

## 2018-09-19 VITALS — HEIGHT: 67 IN | WEIGHT: 257.06 LBS

## 2018-09-19 DIAGNOSIS — Z29.9 ENCOUNTER FOR PROPHYLACTIC MEASURES, UNSPECIFIED: ICD-10-CM

## 2018-09-19 DIAGNOSIS — D64.9 ANEMIA, UNSPECIFIED: ICD-10-CM

## 2018-09-19 DIAGNOSIS — Z98.891 HISTORY OF UTERINE SCAR FROM PREVIOUS SURGERY: Chronic | ICD-10-CM

## 2018-09-19 DIAGNOSIS — R06.09 OTHER FORMS OF DYSPNEA: ICD-10-CM

## 2018-09-19 DIAGNOSIS — I10 ESSENTIAL (PRIMARY) HYPERTENSION: ICD-10-CM

## 2018-09-19 DIAGNOSIS — D17.1 BENIGN LIPOMATOUS NEOPLASM OF SKIN AND SUBCUTANEOUS TISSUE OF TRUNK: Chronic | ICD-10-CM

## 2018-09-19 DIAGNOSIS — R05 COUGH: ICD-10-CM

## 2018-09-19 LAB
ALBUMIN SERPL ELPH-MCNC: 3.4 G/DL — SIGNIFICANT CHANGE UP (ref 3.3–5)
ALP SERPL-CCNC: 98 U/L — SIGNIFICANT CHANGE UP (ref 40–120)
ALT FLD-CCNC: 63 U/L — SIGNIFICANT CHANGE UP (ref 12–78)
ANION GAP SERPL CALC-SCNC: 10 MMOL/L — SIGNIFICANT CHANGE UP (ref 5–17)
AST SERPL-CCNC: 28 U/L — SIGNIFICANT CHANGE UP (ref 15–37)
BASOPHILS # BLD AUTO: 0.05 K/UL — SIGNIFICANT CHANGE UP (ref 0–0.2)
BASOPHILS NFR BLD AUTO: 0.5 % — SIGNIFICANT CHANGE UP (ref 0–2)
BILIRUB SERPL-MCNC: 1.2 MG/DL — SIGNIFICANT CHANGE UP (ref 0.2–1.2)
BUN SERPL-MCNC: 19 MG/DL — SIGNIFICANT CHANGE UP (ref 7–23)
CALCIUM SERPL-MCNC: 8.5 MG/DL — SIGNIFICANT CHANGE UP (ref 8.5–10.1)
CHLORIDE SERPL-SCNC: 107 MMOL/L — SIGNIFICANT CHANGE UP (ref 96–108)
CO2 SERPL-SCNC: 23 MMOL/L — SIGNIFICANT CHANGE UP (ref 22–31)
CREAT SERPL-MCNC: 0.86 MG/DL — SIGNIFICANT CHANGE UP (ref 0.5–1.3)
EOSINOPHIL # BLD AUTO: 0.22 K/UL — SIGNIFICANT CHANGE UP (ref 0–0.5)
EOSINOPHIL NFR BLD AUTO: 2.3 % — SIGNIFICANT CHANGE UP (ref 0–6)
GLUCOSE SERPL-MCNC: 106 MG/DL — HIGH (ref 70–99)
HCT VFR BLD CALC: 35.7 % — SIGNIFICANT CHANGE UP (ref 34.5–45)
HGB BLD-MCNC: 11.2 G/DL — LOW (ref 11.5–15.5)
IMM GRANULOCYTES NFR BLD AUTO: 0.3 % — SIGNIFICANT CHANGE UP (ref 0–1.5)
INR BLD: 1.24 RATIO — HIGH (ref 0.88–1.16)
LYMPHOCYTES # BLD AUTO: 3.37 K/UL — HIGH (ref 1–3.3)
LYMPHOCYTES # BLD AUTO: 36 % — SIGNIFICANT CHANGE UP (ref 13–44)
MAGNESIUM SERPL-MCNC: 2 MG/DL — SIGNIFICANT CHANGE UP (ref 1.6–2.6)
MCHC RBC-ENTMCNC: 24.8 PG — LOW (ref 27–34)
MCHC RBC-ENTMCNC: 31.4 GM/DL — LOW (ref 32–36)
MCV RBC AUTO: 79 FL — LOW (ref 80–100)
MONOCYTES # BLD AUTO: 0.57 K/UL — SIGNIFICANT CHANGE UP (ref 0–0.9)
MONOCYTES NFR BLD AUTO: 6.1 % — SIGNIFICANT CHANGE UP (ref 2–14)
NEUTROPHILS # BLD AUTO: 5.13 K/UL — SIGNIFICANT CHANGE UP (ref 1.8–7.4)
NEUTROPHILS NFR BLD AUTO: 54.8 % — SIGNIFICANT CHANGE UP (ref 43–77)
NRBC # BLD: 0 /100 WBCS — SIGNIFICANT CHANGE UP (ref 0–0)
NT-PROBNP SERPL-SCNC: 5007 PG/ML — HIGH (ref 0–125)
PLATELET # BLD AUTO: 462 K/UL — HIGH (ref 150–400)
POTASSIUM SERPL-MCNC: 3.8 MMOL/L — SIGNIFICANT CHANGE UP (ref 3.5–5.3)
POTASSIUM SERPL-SCNC: 3.8 MMOL/L — SIGNIFICANT CHANGE UP (ref 3.5–5.3)
PROT SERPL-MCNC: 7.7 GM/DL — SIGNIFICANT CHANGE UP (ref 6–8.3)
PROTHROM AB SERPL-ACNC: 13.4 SEC — HIGH (ref 9.8–12.7)
RBC # BLD: 4.52 M/UL — SIGNIFICANT CHANGE UP (ref 3.8–5.2)
RBC # FLD: 16 % — HIGH (ref 10.3–14.5)
SODIUM SERPL-SCNC: 140 MMOL/L — SIGNIFICANT CHANGE UP (ref 135–145)
TROPONIN I SERPL-MCNC: 0.03 NG/ML — SIGNIFICANT CHANGE UP (ref 0.01–0.04)
WBC # BLD: 9.37 K/UL — SIGNIFICANT CHANGE UP (ref 3.8–10.5)
WBC # FLD AUTO: 9.37 K/UL — SIGNIFICANT CHANGE UP (ref 3.8–10.5)

## 2018-09-19 PROCEDURE — 93010 ELECTROCARDIOGRAM REPORT: CPT

## 2018-09-19 PROCEDURE — 99285 EMERGENCY DEPT VISIT HI MDM: CPT

## 2018-09-19 PROCEDURE — 71045 X-RAY EXAM CHEST 1 VIEW: CPT | Mod: 26

## 2018-09-19 RX ORDER — ASPIRIN/CALCIUM CARB/MAGNESIUM 324 MG
325 TABLET ORAL ONCE
Qty: 0 | Refills: 0 | Status: COMPLETED | OUTPATIENT
Start: 2018-09-19 | End: 2018-09-19

## 2018-09-19 RX ORDER — AZILSARTAN KAMEDOXOMIL AND CHLORTHALIDONE 40; 12.5 MG/1; MG/1
1 TABLET ORAL
Qty: 0 | Refills: 0 | COMMUNITY
Start: 2018-09-19

## 2018-09-19 RX ORDER — LABETALOL HCL 100 MG
10 TABLET ORAL ONCE
Qty: 0 | Refills: 0 | Status: DISCONTINUED | OUTPATIENT
Start: 2018-09-19 | End: 2018-09-19

## 2018-09-19 RX ORDER — FUROSEMIDE 40 MG
40 TABLET ORAL DAILY
Qty: 0 | Refills: 0 | Status: DISCONTINUED | OUTPATIENT
Start: 2018-09-19 | End: 2018-09-22

## 2018-09-19 RX ORDER — LABETALOL HCL 100 MG
10 TABLET ORAL ONCE
Qty: 0 | Refills: 0 | Status: COMPLETED | OUTPATIENT
Start: 2018-09-19 | End: 2018-09-19

## 2018-09-19 RX ORDER — FUROSEMIDE 40 MG
40 TABLET ORAL ONCE
Qty: 0 | Refills: 0 | Status: COMPLETED | OUTPATIENT
Start: 2018-09-19 | End: 2018-09-19

## 2018-09-19 RX ORDER — LOSARTAN POTASSIUM 100 MG/1
50 TABLET, FILM COATED ORAL DAILY
Qty: 0 | Refills: 0 | Status: DISCONTINUED | OUTPATIENT
Start: 2018-09-19 | End: 2018-09-19

## 2018-09-19 RX ADMIN — Medication 40 MILLIGRAM(S): at 18:48

## 2018-09-19 RX ADMIN — Medication 10 MILLIGRAM(S): at 23:17

## 2018-09-19 RX ADMIN — Medication 325 MILLIGRAM(S): at 18:51

## 2018-09-19 NOTE — ED PROVIDER NOTE - OBJECTIVE STATEMENT
31 y/o female with PMHx of HTN, post-partum x3 months presents to the ED c/o cough since 9/8 with mild hemoptysis. +SOB, orthopnea x2 weeks. +SHARP. +diarrhea x10 days, improving. +mild LLE/L knee swelling since yesterday. +chest tightness in ED. Denies dysuria, CP. Initially, went to urgent care for cough where pt was found to have elevated BP, but EKG was normal. Followed up with PCP Dr. Valencia who told pt that she had pleural effusion, dc'd Labetalol and started pt on Olmesartan 10nmg and Augmentin. Came to ED 6 days ago, where CXR was performed and pt was dx with pleural effusion and PNA, dc'd with Z-pack. Returned to PCP two days ago, who increased Olmesartan to 20mg. Went to GI yesterday who gave pt Nexium BID. Went to see Dr. Trammell today who sent pt to ED for r/o pericardial effusion vs. cardiomyopathy and recommended IV Lasix, cardiac enzymes, plan for admission with echo tomorrow. Reports FHx of CAD and HTN. 31 y/o female with PMHx of HTN, post-partum x3 months presents to the ED c/o cough since 9/8 with mild hemoptysis. +SOB, orthopnea x2 weeks. +SHARP. +diarrhea x10 days, improving. +mild LLE/L knee swelling since yesterday. +chest tightness in ED. Denies dysuria, CP. Initially, went to urgent care for cough where pt was found to have elevated BP, but EKG was normal. Followed up with PCP Dr. Valencia who told pt that she had pleural effusion, dc'd Labetalol and started pt on Olmesartan 10mg and Augmentin. Came to ED 6 days ago, where CXR was performed and pt was dx with pleural effusion and PNA, dc'd with Z-pack. Returned to PCP two days ago, who increased Olmesartan to 20mg. Went to GI yesterday who gave pt Nexium BID. Went to see Dr. Trammell today who sent pt to ED for r/o pericardial effusion vs. cardiomyopathy and recommended IV Lasix, cardiac enzymes, plan for admission with echo tomorrow. Reports FHx of CAD and HTN. 31 y/o female with PMHx of HTN, post-partum x3 months presents to the ED c/o cough since 9/8 with mild hemoptysis. +SOB, orthopnea x2 weeks. +SHARP. +diarrhea x10 days, improving. +mild LLE/L knee swelling since yesterday. +chest tightness in ED. Denies dysuria, CP. Initially, went to urgent care for cough where pt was found to have elevated BP, but EKG was normal. Followed up with PCP Dr. Valencia who told pt that she had pulm edema, dc'd Labetalol and started pt on Olmesartan 10mg and Augmentin. Came to ED 6 days ago, where CTA was performed and pt was dx with pulm edema and PNA, dc'd with Z-pack. Returned to PCP two days ago, who increased Olmesartan to 20mg. Went to GI yesterday who gave pt Nexium BID. Went to see Dr. Trammell today who sent pt to ED for r/o pericardial effusion vs. cardiomyopathy and recommended IV Lasix, cardiac enzymes, plan for admission with echo tomorrow. Reports FHx of CAD and HTN.

## 2018-09-19 NOTE — ED ADULT TRIAGE NOTE - CHIEF COMPLAINT QUOTE
Patient comes to ED for possible fluid in lungs. Pt was diagnosed last week with pna. pt still complaining SOB. MD mccrary sent for diuretics

## 2018-09-19 NOTE — H&P ADULT - PROBLEM SELECTOR PLAN 1
Exertional dyspnea secondary to suspected acute exacerbation of HF rEF. Possible peripartum cardiomyopathy vs. alternative causes.  -Admit to telemetry  -Monitor vitals per protocol  -Check formal Echocardiogram  -Check TSH, FT4  -Check thiamine  -Recent HIV negative  -Consider lyme titers although no tick exposures  -Lasix 40mg PO daily  -Cardiology Consult: Dr. Trammell  -I/Os, daily weights, fluid restriction <1500 mL - Exertional dyspnea secondary to suspected acute exacerbation of HF rEF. Possible peripartum cardiomyopathy vs. infectious /  autoimmune causes / thyroiditis  - Admit to telemetry  - Monitor vitals per protocol  - Check formal Echocardiogram  - Check TSH, FT4  - Check thiamine  - Recent HIV negative  - Consider lyme titers although no tick exposures  - Lasix 40mg PO daily  - Cardiology Consult: Dr. Trammell  -I/Os, daily weights, fluid restriction <1500 mL

## 2018-09-19 NOTE — ED PROVIDER NOTE - PHYSICAL EXAMINATION
Constitutional: mild distress AAOx3  Eyes: PERRLA EOMI  Head: Normocephalic atraumatic  Mouth: MMM  Cardiac: regular rate.   Resp: Lungs CTAB  GI: Abd s/nt/nd  Neuro: CN2-12 intact  Skin: No rashes Constitutional: mild distress AAOx3  Eyes: PERRLA EOMI  Head: Normocephalic atraumatic  Mouth: MMM  Cardiac: +tachycardic.  Resp: Lungs CTAB  GI: Abd s/nt/nd  Neuro: CN2-12 intact  Skin: No rashes

## 2018-09-19 NOTE — ED PROVIDER NOTE - PROGRESS NOTE DETAILS
Sonny RUEDA for ED Attending Dr. Mcclellan: Bedside US reveals small pericardial effusion with severally reduced EF. Sonny RUEDA for ED Attending Dr. Mcclellan: spoke with Dr. Trammell who communicated severally reduce EF pericardial effusion. He is aware and will see pt in hospital in ED tomorrow. Recommends treating for heart failure. pt with elevated probnp trop and enlarged heart on xray - pt endorsed to Dr. Snyder who knows about pt and accepts for tele. Biju Mcclellan M.D., Attending Physician

## 2018-09-19 NOTE — H&P ADULT - ATTENDING COMMENTS
Admission thoroughly reviewed with resident and encounter was witnessed with the patient her family. care discussed with ED resident. Given patient's HF with low EF -  workup has been initiated. pending echo / tsh.

## 2018-09-19 NOTE — ED STATDOCS - PROGRESS NOTE DETAILS
Sonny YOUSSEF for Dr. Stephenson: 31 y/o female with PMHx of HTN presents to the ED for evaluation of possible pleural effusion. Pt is c/o SOB worse when laying down. Denies CP, fever at this time. +Slight LE edema to left leg yesterday, none today. Pt was seen in the ED on 9/13 for same sx had a CXR, CTA which showed right upper lobe opacities concern for edema vs infection mild pulmonary vascular congestion tx for possible PNA started on outpatient abx. Saw Dr. Andres today who is concerned for pericardial effusion vs hypertensive myopathy. Spoke to NP at Dr. Villareal office recommends labs, cardiac enzymes, CXR, IV lasix and plan for echocardiogram tomorrow. Recently gave birth at the end of June. Will send pt to main ED for further evaluation. Sonny YOUSSEF for Dr. Stephenson: 33 y/o female with PMHx of HTN presents to the ED for evaluation of possible pleural effusion. Pt is c/o SOB worse when laying down. Denies CP, fever at this time. +Slight LE edema to left leg yesterday, none today. Pt was seen in the ED on 9/13 for same sx had a CXR, CTA which showed right upper lobe opacities concern for edema vs infection mild pulmonary vascular congestion tx for possible PNA started on outpatient abx. Saw Dr. Andres today who is concerned for pericardial effusion vs hypertensive cardiomyopathy. Dr. Enrique's office recommends labs, cardiac enzymes, CXR, IV lasix and plan for echocardiogram tomorrow. Recently gave birth at the end of June. Will send pt to main ED for further evaluation.

## 2018-09-19 NOTE — ED PROVIDER NOTE - CHPI ED SYMPTOMS POS
hemoptysis, chest tightness, orthopnea, LLE swelling, diarrhea/SHORTNESS OF BREATH/COUGH/DYSPNEA ON EXERTION

## 2018-09-19 NOTE — H&P ADULT - NSHPREVIEWOFSYSTEMS_GEN_ALL_CORE
REVIEW OF SYSTEMS:  CONSTITUTIONAL: + weakness/fatigue, + fevers (previously), no chills  EYES/ENT: No visual changes;  No vertigo or throat pain   NECK: No pain or stiffness  RESPIRATORY: + cough, no wheezing; + shortness of breath  CARDIOVASCULAR: +chest tightness, but no palpitations  GASTROINTESTINAL: + epigastric pain. No nausea, vomiting; +loose stools. No melena or hematochezia.  GENITOURINARY: No dysuria, frequency or hematuria  NEUROLOGICAL: No numbness  SKIN: No itching, burning, rashes, or lesions   All other review of systems is negative unless indicated above.

## 2018-09-19 NOTE — ED PROVIDER NOTE - NS ED ROS FT
Constitutional: No fever or chills  Eyes: No visual changes  HEENT: No throat pain  CV: No chest pain. +chest tightness.   Resp: +cough, SOB, orthopnea, SHARP, hemoptysis  GI: No abd pain, nausea or vomiting. +diarrhea.   : No dysuria  MSK: No musculoskeletal pain  Skin: No rash. +LLE/L knee swelling.   Neuro: No headache Constitutional: No fever or chills  Eyes: No visual changes  HEENT: No throat pain + URI  CV: No chest pain. +chest tightness.   Resp: +cough, SOB, orthopnea, SHARP,  GI: No abd pain, nausea or vomiting. +diarrhea.   : No dysuria  MSK: No musculoskeletal pain  Skin: No rash.   Neuro: No headache

## 2018-09-19 NOTE — ED PROVIDER NOTE - MEDICAL DECISION MAKING DETAILS
31 y/o female post-partum delivered end of June, recently dx with PNA at ED on CTA which mild pulmonary vascular  congestion along with potential infiltrate. sent in by Dr. Trammell cardiologist for questionable pulmonary edema. Pt has been having progressive SOB, worse with laying down and exertion. seen in office today by Dr. Trammell who is concerned for pericardial effusion vs. cardiomyopathy. Dr. Trammell called in and recommended Lasix, cardiac enzymes, plan for admission with echo tomorrow. Pt states that sx seemed to have started two weeks ago after having a URI. Pt has SHARP as well as orthopnea. On exam, pt with clear lungs. Point of care bedside US reveals small pericardial effusion with severally reduced EF. concern for cardiomyopathy vs. myocarditis. Given sx on exam, will obtain labs, cardiac enzymes, cardiology consult, and admit. 33 y/o female post-partum delivered end of June, recently dx with PNA at ED on CTA which mild pulmonary vascular  congestion along with potential infiltrate. sent in by Dr. Trammell cardiologist for questionable pulmonary edema. Pt has been having progressive SOB, worse with laying down and exertion. seen in office today by Dr. Trammell who is concerned for pericardial effusion vs. cardiomyopathy. Dr. Trammell called in and recommended Lasix, cardiac enzymes, plan for admission with echo tomorrow. Pt states that sx seemed to have started two weeks ago after having a URI. Pt has SHARP as well as orthopnea. On exam, pt with clear lungs. Point of care bedside US reveals small pericardial effusion with severally reduced EF. concern for cardiomyopathy vs. myocarditis. Given sx and exam, will obtain labs, cardiac enzymes, cardiology consult, and admit.

## 2018-09-19 NOTE — ED ADULT NURSE NOTE - NSIMPLEMENTINTERV_GEN_ALL_ED
Implemented All Universal Safety Interventions:  [place] to call system. Call bell, personal items and telephone within reach. Instruct patient to call for assistance. Room bathroom lighting operational. Non-slip footwear when patient is off stretcher. Physically safe environment: no spills, clutter or unnecessary equipment. Stretcher in lowest position, wheels locked, appropriate side rails in place.

## 2018-09-19 NOTE — H&P ADULT - HISTORY OF PRESENT ILLNESS
This is a 33 y/o F  (s/p  2018 for failure to progress after induction) with PMHx significant for HTN that presents with SOB. Reports symptoms began approximately 2 weeks ago, initially noting that she felt fatigued and short of breath on exertion. Gradually progressive symptoms, associated with cough which became blood tinged. Positive orthopnea and reports waking up at night due to anxiety. Describes intermittent pressured chest tightness but no overt CP, palpitations. In the last week she has had intermittent fever but no chills, Tmax 100.8F. In the last 10 days she has had loose stools but no diarrhea. Describes left knee swelling in the past week as well.     Near the onset of her symptoms, patient noted her blood pressure was elevated and went to urgent care and then subsequently to her PCP. Was treated with antibiotics for suspected pneumonia with augmentin, however symptoms did not resolve. On  she was then evaluated in the ED at , a CTA showed mild pulmonary congestion, nodular opacities in RUL (edema vs. infection) and she was discharged on a z-pac. She was seen by Dr. Trammell today and he referred her to the ED for evaluation. At , bedside echo showed EF 20-25% (as per ED physician), her BNP was found to be 5007 and she was given lasix 40mg IV once. EKG showed poor R-wave progression.     During her delivery, patient reports her BP was well controlled on labetalol and she took aspirin due to risk for pre-eclampsia. She was scheduled for induction and  was performed for failure to progressive. Delivery was at term and she is not breast feeding.   Her father has history of CAD, MI ( at 59), HTN, DM2  No DM2, HTN, CAD in mother.   NO family history of autoimmune conditions. This is a 31 y/o F  (s/p  2018 for failure to progress after induction) with PMHx significant for HTN that presents with SOB. Reports symptoms began approximately 2 weeks ago, initially noting that she felt fatigued and short of breath on exertion. Gradually progressive symptoms, associated with cough which became blood tinged. Positive orthopnea and reports waking up at night due to anxiety. Describes intermittent pressured chest tightness but no overt CP, palpitations. In the last week she has had intermittent fever but no chills, Tmax 100.8F. In the last 10 days she has had loose stools but no diarrhea. Describes left knee swelling in the past week as well.     Near the onset of her symptoms, patient noted her blood pressure was elevated and went to urgent care and then subsequently to her PCP. Was treated with antibiotics for suspected pneumonia with augmentin, however symptoms did not resolve. On  she was then evaluated in the ED at , a CTA showed mild pulmonary congestion, nodular opacities in RUL (edema vs. infection) and she was discharged on a z-pac. She was seen by Dr. Trammell today and he referred her to the ED for evaluation. At , bedside echo showed EF 20-25% (as per ED physician), her BNP was found to be 5007 and she was given lasix 40mg IV once. EKG showed poor R-wave progression.     During her pregnancy, patient reports her BP was well controlled on labetalol and she took aspirin due to risk for pre-eclampsia. She was scheduled for induction and  was performed for failure to progress. Delivery was at term and she is not breast feeding.   Her father has history of CAD, MI ( at 59), HTN, DM2  No DM2, HTN, CAD in mother.   NO family history of autoimmune conditions.

## 2018-09-19 NOTE — H&P ADULT - NSHPPHYSICALEXAM_GEN_ALL_CORE
T(C): 37.1 (09-19-18 @ 17:21)  T(F): 98.7 (09-19-18 @ 17:21), Max: 98.7 (09-19-18 @ 17:21)  HR: 105 (09-19-18 @ 19:27) (103 - 105)  BP: 151/116 (09-19-18 @ 19:27) (148/126 - 161/128)  RR:  (19 - 22)  SpO2:  (100% - 100%)  Wt(kg): --    PHYSICAL EXAM:  GENERAL: NAD, well-groomed, well-developed  HEAD:  NC/AT  EYES: EOMI, PERRLA, no scleral icterus  HEENT: Moist mucous membranes  NECK: Supple, Minimal JVD  CNS:  Alert & Oriented X3, Motor Strength 5/5 B/L upper and lower extremities  LUNG: Clear to auscultation bilaterally; No rales, rhonchi, wheezing, or rubs  HEART: S1/S2 present, tachycardic; No murmurs, rubs, or gallops  ABDOMEN: +BS, ST/ND/NT  GENITOURINARY- Voiding, Bladder not distended  EXTREMITIES:  2+ Peripheral Pulses, Trace pitting edema  MUSCULOSKELTAL- Joints normal ROM, no Muscle or joint tenderness  SKIN: No rash

## 2018-09-19 NOTE — H&P ADULT - ASSESSMENT
This is a 33 y/o F  (s/p  2018 for failure to progress after induction) with PMHx significant for HTN that presents with SOB. Reports symptoms began approximately 2 weeks ago, initially noting that she felt fatigued and short of breath on exertion. Gradually progressive symptoms, associated with cough which became blood tinged. Positive orthopnea and reports waking up at night due to anxiety. Describes intermittent pressured chest tightness but no overt CP, palpitations. In the last week she has had intermittent fever but no chills, Tmax 100.8F. In the last 10 days she has had loose stools but no diarrhea. Describes left knee swelling in the past week as well.     Near the onset of her symptoms, patient noted her blood pressure was elevated and went to urgent care and then subsequently to her PCP. Was treated with antibiotics for suspected pneumonia with augmentin, however symptoms did not resolve. On  she was then evaluated in the ED at , a CTA showed mild pulmonary congestion, nodular opacities in RUL (edema vs. infection) and she was discharged on a z-pac. She was seen by Dr. Trammell today and he referred her to the ED for evaluation. At , bedside echo showed EF 20-25% (as per ED physician), her BNP was found to be 5007 and she was given lasix 40mg IV once. EKG showed poor R-wave progression.     During her delivery, patient reports her BP was well controlled on labetalol and she took aspirin due to risk for pre-eclampsia. She was scheduled for induction and  was performed for failure to progressive. Delivery was at term and she is not breast feeding.   Her father has history of CAD, MI ( at 59), HTN, DM2  No DM2, HTN, CAD in mother.   NO family history of autoimmune conditions.

## 2018-09-19 NOTE — H&P ADULT - FAMILY HISTORY
Father  Still living? No  Family history of acute myocardial infarction, Age at diagnosis: 51-60  Family history of diabetes mellitus, Age at diagnosis: Age Unknown

## 2018-09-19 NOTE — H&P ADULT - PROBLEM SELECTOR PLAN 5
IMPROVE VTE Individual Risk Assessment    RISK                                                                Points  [  ] Previous VTE                                                  3  [  ] Thrombophilia                                               2  [  ] Lower limb paralysis                                      2        (unable to hold up >15 seconds)    [  ] Current Cancer                                              2         (within 6 months)  [  ] Immobilization > 24 hrs                                1  [  ] ICU/CCU stay > 24 hours                              1  [  ] Age > 60                                                      1  IMPROVE VTE Score ___0_____    Ambulate as tolerated

## 2018-09-19 NOTE — H&P ADULT - PROBLEM SELECTOR PLAN 3
-With hemoptysis vs. upper airway irritation. Continue to observe clinically - With hemoptysis vs. upper airway irritation. Continue to observe clinically

## 2018-09-19 NOTE — ED PROVIDER NOTE - NS_ ATTENDINGSCRIBEDETAILS _ED_A_ED_FT
I, Biju Mcclellan MD,  performed the initial face to face bedside interview with this patient regarding history of present illness, review of symptoms and relevant past medical, social and family history.  I completed an independent physical examination.  I was the initial provider who evaluated this patient.  The history, relevant review of systems, past medical and surgical history, medical decision making, and physical examination was documented by the scribe in my presence and I attest to the accuracy of the documentation.

## 2018-09-19 NOTE — ED ADULT NURSE NOTE - OBJECTIVE STATEMENT
patient had a baby 12 weeks ago, sept 8th she noted + cough and SOB. she was seen at urgent care which dx URI, she followed up with pcp and was started on augmentin with a change in her antihypertensives.  patient did not have any improvement in cough and began to experience SHARP and orthopnea she came to ER on thursday and was dx with PNA and started on ZPack.  She followed up with her PCP antihypertensives increased.  She saw GI yesterday and was started on nexium.  She continued to experience SOB and went to see Dr Trammell today and was sent to ER for admission.

## 2018-09-20 LAB
ALBUMIN SERPL ELPH-MCNC: 3.1 G/DL — LOW (ref 3.3–5)
ALP SERPL-CCNC: 91 U/L — SIGNIFICANT CHANGE UP (ref 40–120)
ALT FLD-CCNC: 64 U/L — SIGNIFICANT CHANGE UP (ref 12–78)
ANION GAP SERPL CALC-SCNC: 8 MMOL/L — SIGNIFICANT CHANGE UP (ref 5–17)
AST SERPL-CCNC: 31 U/L — SIGNIFICANT CHANGE UP (ref 15–37)
BASOPHILS # BLD AUTO: 0.04 K/UL — SIGNIFICANT CHANGE UP (ref 0–0.2)
BASOPHILS NFR BLD AUTO: 0.5 % — SIGNIFICANT CHANGE UP (ref 0–2)
BILIRUB SERPL-MCNC: 1.4 MG/DL — HIGH (ref 0.2–1.2)
BUN SERPL-MCNC: 21 MG/DL — SIGNIFICANT CHANGE UP (ref 7–23)
CALCIUM SERPL-MCNC: 8.7 MG/DL — SIGNIFICANT CHANGE UP (ref 8.5–10.1)
CHLORIDE SERPL-SCNC: 105 MMOL/L — SIGNIFICANT CHANGE UP (ref 96–108)
CO2 SERPL-SCNC: 26 MMOL/L — SIGNIFICANT CHANGE UP (ref 22–31)
CREAT SERPL-MCNC: 0.92 MG/DL — SIGNIFICANT CHANGE UP (ref 0.5–1.3)
EOSINOPHIL # BLD AUTO: 0.21 K/UL — SIGNIFICANT CHANGE UP (ref 0–0.5)
EOSINOPHIL NFR BLD AUTO: 2.6 % — SIGNIFICANT CHANGE UP (ref 0–6)
GLUCOSE SERPL-MCNC: 86 MG/DL — SIGNIFICANT CHANGE UP (ref 70–99)
HCT VFR BLD CALC: 36.1 % — SIGNIFICANT CHANGE UP (ref 34.5–45)
HGB BLD-MCNC: 11.1 G/DL — LOW (ref 11.5–15.5)
IMM GRANULOCYTES NFR BLD AUTO: 0.4 % — SIGNIFICANT CHANGE UP (ref 0–1.5)
LYMPHOCYTES # BLD AUTO: 2.76 K/UL — SIGNIFICANT CHANGE UP (ref 1–3.3)
LYMPHOCYTES # BLD AUTO: 34 % — SIGNIFICANT CHANGE UP (ref 13–44)
MCHC RBC-ENTMCNC: 24.9 PG — LOW (ref 27–34)
MCHC RBC-ENTMCNC: 30.7 GM/DL — LOW (ref 32–36)
MCV RBC AUTO: 80.9 FL — SIGNIFICANT CHANGE UP (ref 80–100)
MONOCYTES # BLD AUTO: 0.53 K/UL — SIGNIFICANT CHANGE UP (ref 0–0.9)
MONOCYTES NFR BLD AUTO: 6.5 % — SIGNIFICANT CHANGE UP (ref 2–14)
NEUTROPHILS # BLD AUTO: 4.54 K/UL — SIGNIFICANT CHANGE UP (ref 1.8–7.4)
NEUTROPHILS NFR BLD AUTO: 56 % — SIGNIFICANT CHANGE UP (ref 43–77)
NRBC # BLD: 0 /100 WBCS — SIGNIFICANT CHANGE UP (ref 0–0)
NT-PROBNP SERPL-SCNC: 5907 PG/ML — HIGH (ref 0–125)
PLATELET # BLD AUTO: 414 K/UL — HIGH (ref 150–400)
POTASSIUM SERPL-MCNC: 3.9 MMOL/L — SIGNIFICANT CHANGE UP (ref 3.5–5.3)
POTASSIUM SERPL-SCNC: 3.9 MMOL/L — SIGNIFICANT CHANGE UP (ref 3.5–5.3)
PROT SERPL-MCNC: 7.3 GM/DL — SIGNIFICANT CHANGE UP (ref 6–8.3)
RAPID RVP RESULT: SIGNIFICANT CHANGE UP
RBC # BLD: 4.46 M/UL — SIGNIFICANT CHANGE UP (ref 3.8–5.2)
RBC # FLD: 15.8 % — HIGH (ref 10.3–14.5)
SODIUM SERPL-SCNC: 139 MMOL/L — SIGNIFICANT CHANGE UP (ref 135–145)
T4 FREE SERPL-MCNC: 1.38 NG/DL — SIGNIFICANT CHANGE UP (ref 0.76–1.46)
TROPONIN I SERPL-MCNC: 0.03 NG/ML — SIGNIFICANT CHANGE UP (ref 0.01–0.04)
TSH SERPL-MCNC: 4.38 UU/ML — SIGNIFICANT CHANGE UP (ref 0.34–4.82)
WBC # BLD: 8.11 K/UL — SIGNIFICANT CHANGE UP (ref 3.8–10.5)
WBC # FLD AUTO: 8.11 K/UL — SIGNIFICANT CHANGE UP (ref 3.8–10.5)

## 2018-09-20 PROCEDURE — 93306 TTE W/DOPPLER COMPLETE: CPT | Mod: 26

## 2018-09-20 RX ORDER — ALPRAZOLAM 0.25 MG
0.25 TABLET ORAL ONCE
Qty: 0 | Refills: 0 | Status: DISCONTINUED | OUTPATIENT
Start: 2018-09-20 | End: 2018-09-20

## 2018-09-20 RX ORDER — LOSARTAN POTASSIUM 100 MG/1
100 TABLET, FILM COATED ORAL DAILY
Qty: 0 | Refills: 0 | Status: DISCONTINUED | OUTPATIENT
Start: 2018-09-21 | End: 2018-09-22

## 2018-09-20 RX ORDER — LOSARTAN POTASSIUM 100 MG/1
50 TABLET, FILM COATED ORAL ONCE
Qty: 0 | Refills: 0 | Status: COMPLETED | OUTPATIENT
Start: 2018-09-20 | End: 2018-09-20

## 2018-09-20 RX ORDER — CARVEDILOL PHOSPHATE 80 MG/1
6.25 CAPSULE, EXTENDED RELEASE ORAL ONCE
Qty: 0 | Refills: 0 | Status: COMPLETED | OUTPATIENT
Start: 2018-09-20 | End: 2018-09-20

## 2018-09-20 RX ORDER — INFLUENZA VIRUS VACCINE 15; 15; 15; 15 UG/.5ML; UG/.5ML; UG/.5ML; UG/.5ML
0.5 SUSPENSION INTRAMUSCULAR ONCE
Qty: 0 | Refills: 0 | Status: DISCONTINUED | OUTPATIENT
Start: 2018-09-20 | End: 2018-09-22

## 2018-09-20 RX ORDER — LOSARTAN POTASSIUM 100 MG/1
50 TABLET, FILM COATED ORAL DAILY
Qty: 0 | Refills: 0 | Status: DISCONTINUED | OUTPATIENT
Start: 2018-09-20 | End: 2018-09-20

## 2018-09-20 RX ORDER — ISOSORBIDE DINITRATE 5 MG/1
20 TABLET ORAL THREE TIMES A DAY
Qty: 0 | Refills: 0 | Status: DISCONTINUED | OUTPATIENT
Start: 2018-09-20 | End: 2018-09-21

## 2018-09-20 RX ORDER — ACETAMINOPHEN 500 MG
650 TABLET ORAL EVERY 6 HOURS
Qty: 0 | Refills: 0 | Status: DISCONTINUED | OUTPATIENT
Start: 2018-09-20 | End: 2018-09-22

## 2018-09-20 RX ORDER — LANOLIN ALCOHOL/MO/W.PET/CERES
3 CREAM (GRAM) TOPICAL AT BEDTIME
Qty: 0 | Refills: 0 | Status: DISCONTINUED | OUTPATIENT
Start: 2018-09-20 | End: 2018-09-20

## 2018-09-20 RX ORDER — CARVEDILOL PHOSPHATE 80 MG/1
6.25 CAPSULE, EXTENDED RELEASE ORAL EVERY 12 HOURS
Qty: 0 | Refills: 0 | Status: DISCONTINUED | OUTPATIENT
Start: 2018-09-20 | End: 2018-09-22

## 2018-09-20 RX ORDER — LANOLIN ALCOHOL/MO/W.PET/CERES
5 CREAM (GRAM) TOPICAL AT BEDTIME
Qty: 0 | Refills: 0 | Status: DISCONTINUED | OUTPATIENT
Start: 2018-09-20 | End: 2018-09-22

## 2018-09-20 RX ADMIN — LOSARTAN POTASSIUM 50 MILLIGRAM(S): 100 TABLET, FILM COATED ORAL at 05:06

## 2018-09-20 RX ADMIN — ISOSORBIDE DINITRATE 20 MILLIGRAM(S): 5 TABLET ORAL at 23:56

## 2018-09-20 RX ADMIN — CARVEDILOL PHOSPHATE 6.25 MILLIGRAM(S): 80 CAPSULE, EXTENDED RELEASE ORAL at 11:21

## 2018-09-20 RX ADMIN — Medication 0.25 MILLIGRAM(S): at 00:58

## 2018-09-20 RX ADMIN — CARVEDILOL PHOSPHATE 6.25 MILLIGRAM(S): 80 CAPSULE, EXTENDED RELEASE ORAL at 18:00

## 2018-09-20 RX ADMIN — Medication 5 MILLIGRAM(S): at 23:56

## 2018-09-20 RX ADMIN — ISOSORBIDE DINITRATE 20 MILLIGRAM(S): 5 TABLET ORAL at 17:57

## 2018-09-20 RX ADMIN — Medication 0.25 MILLIGRAM(S): at 13:54

## 2018-09-20 RX ADMIN — LOSARTAN POTASSIUM 50 MILLIGRAM(S): 100 TABLET, FILM COATED ORAL at 21:06

## 2018-09-20 RX ADMIN — Medication 40 MILLIGRAM(S): at 11:21

## 2018-09-20 RX ADMIN — Medication 1 TABLET(S): at 11:21

## 2018-09-20 NOTE — PROGRESS NOTE ADULT - SUBJECTIVE AND OBJECTIVE BOX
Pt has been seen and examined with FP resident, resident supervised agree with a/p       Patient is a 32y old  Female who presents with a chief complaint of SOB (20 Sep 2018 07:34)          PHYSICAL EXAM:  Vital Signs Last 24 Hrs  T(C): 36.7 (20 Sep 2018 11:04), Max: 37.1 (19 Sep 2018 17:21)  T(F): 98.1 (20 Sep 2018 11:04), Max: 98.7 (19 Sep 2018 17:21)  HR: 97 (20 Sep 2018 11:04) (85 - 105)  BP: 150/106 (20 Sep 2018 11:04) (145/103 - 161/128)  BP(mean): --  RR: 18 (20 Sep 2018 11:04) (18 - 22)  SpO2: 96% (20 Sep 2018 11:04) (96% - 100%)  general- comfortable   -rs-aeeb,cta  -cvs-s1s2 normal   -p/a-soft,bs+  -extremity- no asymmetrical swelling noted   -cns- non focal         A/P    #Probably acute decompensated systolic heart failure   -ct lasix, increase losartan, add isosorbid to decrease preload and helps in decreasing blood pressure     #Cardiomyopathy ? etiology     #Above discussed with pt, pt's family at bedside, discussed with Dr. Pacheco.

## 2018-09-20 NOTE — CONSULT NOTE ADULT - ASSESSMENT
SOB and edema-known LVEF 25% per limited echo in office.  Awaiting full echo.   relief with lasix. iv. Cotcale lasix.  Will start her on coreg.  Continue losartan.  She had a cough now and so will hold off ACEI.    HTN- meds as stated above.    Prolonged QT interval- repeat ekg today.  Avoid agents that can prolong QT interval.    Other medical issues- Management per primary team.   Thank you for allowing me to participate in the care of this patient. Please feel free to contact me with any questions.

## 2018-09-20 NOTE — CONSULT NOTE ADULT - ASSESSMENT
32 yr old female with PMhx of HTN presents with 3 week history of SOB, she is 3 months post partum.  LVEF reduced, 25% on labetalol at home and during pregnancy.    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  Continue diuresis.  Continue ARB and beta blockers.  Will need 3 months of optimal medical therapy and then repeat echo to assess LV function.  F/U in EP office as outpatient in 3 weeks, will need genetic testing performed.  Await full echo report.  Will follow this pleasant patient, thank you for the consult.

## 2018-09-20 NOTE — PROVIDER CONTACT NOTE (OTHER) - ASSESSMENT
Pt has been very hypertensive since admission. Note says to hold BP medications. Pt received labetalol earlier this evening and tolerated well.

## 2018-09-20 NOTE — PROGRESS NOTE ADULT - SUBJECTIVE AND OBJECTIVE BOX
HPI:  33 yo female,  (s/p  2018 for failure to progress after induction) with PMH significant for HTN who presented with shortness of breath. Pt reports the onset of her symptom to be about 2 weeks ago, with noted SOB on exertion, also with orthopnea, and waking up at night with anxiety. Pt states that her symptoms have gradually worsened, and has had blood-tinged cough and also has experienced intermittent chest tightness, fever with Tmax of 100.8, R knee swelling, and loose stools in the past 10 days. Pt was also recently seen at urgent clinic for elevated BP. Pt received abx treatment for suspected pneumonia. Pt was seen at  ED recently as no improvement in her sxs, with CTA chest showing pulmonary congestion and RUL opacities, and therefore started on Z-pac. Pt subsequently followed with her Cardiologist (Dr. Trammell) where limited echo in office showed LVEF of 20-25%, and with elevated BNP in the 5000s. Pt was given lasix doses.    SUBJECTIVE:   2018:  Pt reports that her symptoms are much better today, and is without shortness of breath, no other symptoms. Pt went for an echo today, currently pending. No fevers.      REVIEW OF SYSTEMS:  CONSTITUTIONAL: No weakness, fevers or chills  EYES/ENT: No visual changes;  No vertigo or throat pain   NECK: No pain or stiffness  RESPIRATORY: No cough, wheezing, hemoptysis; resolved shortness of breath  CARDIOVASCULAR: No chest pain or palpitations  GASTROINTESTINAL: No abdominal or epigastric pain. No nausea, vomiting, or hematemesis; No diarrhea or constipation. No melena or hematochezia.  GENITOURINARY: No dysuria, frequency or hematuria  NEUROLOGICAL: No numbness or weakness  SKIN: No itching, burning, rashes, or lesions   All other review of systems is negative unless indicated above    Vital Signs Last 24 Hrs  T(C): 36.8 (20 Sep 2018 16:27), Max: 36.8 (20 Sep 2018 16:27)  T(F): 98.2 (20 Sep 2018 16:27), Max: 98.2 (20 Sep 2018 16:27)  HR: 94 (20 Sep 2018 16:27) (85 - 105)  BP: 139/101 (20 Sep 2018 16:27) (139/101 - 161/125)  RR: 18 (20 Sep 2018 16:27) (18 - 22)  SpO2: 99% (20 Sep 2018 16:27) (96% - 100%)    I&O's Summary    20 Sep 2018 07:01  -  20 Sep 2018 18:23  --------------------------------------------------------  IN: 0 mL / OUT: 750 mL / NET: -750 mL      PHYSICAL EXAM:  Constitutional: NAD, awake and alert, well-appearing  HEENT: PERR, EOMI, Normal Hearing, MMM  Neck: Soft and supple, No LAD, No JVD noted  Respiratory: Lungs clear, no wheezing, rales or rhonchi  Cardiovascular: S1 and S2, regular rate and rhythm, no murmurs, gallops or rubs  Gastrointestinal: Bowel Sounds present, soft, nontender, nondistended, no guarding, no rebound  Extremities: No peripheral edema noted  Vascular: 2+ peripheral pulses  Neurological: A/O x 3, no focal deficits  Musculoskeletal: 5/5 strength b/l upper and lower extremities  Skin: No rashes    MEDICATIONS:  MEDICATIONS  (STANDING):  carvedilol 6.25 milliGRAM(s) Oral every 12 hours  furosemide    Tablet 40 milliGRAM(s) Oral daily  influenza   Vaccine 0.5 milliLiter(s) IntraMuscular once  isosorbide   dinitrate Tablet (ISORDIL) 20 milliGRAM(s) Oral three times a day  losartan 50 milliGRAM(s) Oral once  prenatal multivitamin 1 Tablet(s) Oral daily  ranitidine  Oral Tab/Cap - Peds 150 milliGRAM(s) Oral daily      LABS: All Labs Reviewed:                        11.1   8.11  )-----------( 414      ( 20 Sep 2018 07:01 )             36.1     09-20    139  |  105  |  21  ----------------------------<  86  3.9   |  26  |  0.92    Ca    8.7      20 Sep 2018 07:01  Mg     2.0     09-19    TPro  7.3  /  Alb  3.1<L>  /  TBili  1.4<H>  /  DBili  x   /  AST  31  /  ALT  64  /  AlkPhos  91  09-20    PT/INR - ( 19 Sep 2018 17:40 )   PT: 13.4 sec;   INR: 1.24 ratio           CARDIAC MARKERS ( 20 Sep 2018 07:01 )  0.033 ng/mL / x     / x     / x     / x      CARDIAC MARKERS ( 19 Sep 2018 17:40 )  0.030 ng/mL / x     / x     / x     / x

## 2018-09-20 NOTE — CONSULT NOTE ADULT - SUBJECTIVE AND OBJECTIVE BOX
Patient is a 32y old  Female who presents with a chief complaint of SOB.      HPI:  This is a 33 y/o F  with prior medical history of HTN presented with c/o worsening SOB and edeme of LLE.  She was recently treated with pneumonia.  C/o SHARP and orthopnea.  Since admission she received iv lasix and pt states there is relief in SHARP and orthopnea.  No CP.  Pt states BP well controlled during pregnancy.   No HIV, hypothryoidism or alcoholism.  No travel outside the country.      PAST MEDICAL & SURGICAL HISTORY:  HTN (hypertension)  Lipoma of back: S/p removal  H/O  section      MEDICATIONS  (STANDING):  furosemide    Tablet 40 milliGRAM(s) Oral daily  influenza   Vaccine 0.5 milliLiter(s) IntraMuscular once  losartan 50 milliGRAM(s) Oral daily  prenatal multivitamin 1 Tablet(s) Oral daily  ranitidine  Oral Tab/Cap - Peds 150 milliGRAM(s) Oral daily    MEDICATIONS  (PRN):      FAMILY HISTORY:  Family history of diabetes mellitus (Father)  Family history of acute myocardial infarction (Father)      SOCIAL HISTORY:    REVIEW OF SYSTEMS:  CONSTITUTIONAL:    No fatigue, malaise, lethargy.  No fever or chills.  HEENT:  Eyes:  No visual changes.     ENT:  No epistaxis.  No sinus pain.    RESPIRATORY:  No cough.  No wheeze.  No hemoptysis.  c/o shortness of breath.  CARDIOVASCULAR:  No chest pains.  No palpitations. c/o shortness of breath, No orthopnea or PND.  GASTROINTESTINAL:  No abdominal pain.  No nausea or vomiting.    GENITOURINARY:    No hematuria.    MUSCULOSKELETAL:  No musculoskeletal pain.  No joint swelling.  No arthritis.  NEUROLOGICAL:  No tingling or numbness or weakness.  PSYCHIATRIC:  No confusion  SKIN:  No rashes.    ENDOCRINE:  No unexplained weight loss.  No polydipsia.   HEMATOLOGIC:  No anemia.  No prolonged or excessive bleeding.   ALLERGIC AND IMMUNOLOGIC:  No pruritus.          Vital Signs Last 24 Hrs  T(C): 36.6 (20 Sep 2018 04:53), Max: 37.1 (19 Sep 2018 17:21)  T(F): 97.8 (20 Sep 2018 04:53), Max: 98.7 (19 Sep 2018 17:21)  HR: 96 (20 Sep 2018 04:53) (85 - 105)  BP: 155/113 (20 Sep 2018 04:53) (145/103 - 161/128)  BP(mean): --  RR: 18 (20 Sep 2018 04:53) (18 - 22)  SpO2: 100% (20 Sep 2018 04:53) (97% - 100%)    PHYSICAL EXAM-    Constitutional: obese, no acute distress    Head: Head is normocephalic and atraumatic.      Neck: No jugular venous distention. No audible carotid bruits. There are strong carotid pulses bilaterally. No JVD.     Cardiovascular: Regular rate and rhythm without S3, S4. No murmurs or rubs are appreciated.      Respiratory: Breath sounds are normal. No rales. No wheezing.    Abdomen: Soft, nontender, nondistended with positive bowel sounds.      Extremity: No tenderness. No  pitting edema     Neurologic: The patient is alert and oriented.      Skin: No rash, no obvious lesions noted.      Psychiatric: The patient appears to be emotionally stable.      INTERPRETATION OF TELEMETRY: sinus rythm    ECG: Sinus tachycardia, normal axis, prolonged QT interval.     I&O's Detail      LABS:                        11.2   9.37  )-----------( 462      ( 19 Sep 2018 17:40 )             35.7         140  |  107  |  19  ----------------------------<  106<H>  3.8   |  23  |  0.86    Ca    8.5      19 Sep 2018 17:40  Mg     2.0         TPro  7.7  /  Alb  3.4  /  TBili  1.2  /  DBili  x   /  AST  28  /  ALT  63  /  AlkPhos  98      CARDIAC MARKERS ( 19 Sep 2018 17:40 )  0.030 ng/mL / x     / x     / x     / x          PT/INR - ( 19 Sep 2018 17:40 )   PT: 13.4 sec;   INR: 1.24 ratio             I&O's Summary    BNPSerum Pro-Brain Natriuretic Peptide: 5007 pg/mL ( @ 17:40)    RADIOLOGY & ADDITIONAL STUDIES:

## 2018-09-20 NOTE — PROGRESS NOTE ADULT - ASSESSMENT
31 yo female as described above presenting with worsening shortness of breath x 2 weeks.        #Shortness of breath  -Likely due to pulmonary congestion secondary to pato-partum vs. viral cardiomyopathy/myocarditis  - Elevated BNP of 5000s  - CTA chest on 9/13 with mild pulmonary vascular congestion  - CXR on 9/19 with no acute pulmonary disease  - Shortness of breath improved after lasix administration  - In-office echo with LVEF of 20-25%  - s/p ECHO today - f/u final read  - Continue on lasix  - Continue carvedilol, losartan and isosorbide nitrate  - TSH wnl  - F/U RVP  - Cardiology recs appreciated  - EP recs appreciated - Will need 3 months of optimal medical therapy and then repeat echo to assess LV function. F/U in EP office as outpatient in 3 weeks, will need genetic testing performed.    #HTN  - BP controlled  - Continue on BP medications as above

## 2018-09-20 NOTE — CONSULT NOTE ADULT - SUBJECTIVE AND OBJECTIVE BOX
HPI:  This is a 31 y/o F  (s/p  2018 for failure to progress after induction) with PMHx significant for HTN that presents with SOB. Reports symptoms began approximately 2 weeks ago, initially noting that she felt fatigued and short of breath on exertion. Gradually progressive symptoms, associated with cough which became blood tinged. Positive orthopnea and reports waking up at night due to anxiety. Describes intermittent pressured chest tightness but no overt CP, palpitations. In the last week she has had intermittent fever but no chills, Tmax 100.8F. In the last 10 days she has had loose stools but no diarrhea. Describes left knee swelling in the past week as well.     Near the onset of her symptoms, patient noted her blood pressure was elevated and went to urgent care and then subsequently to her PCP. Was treated with antibiotics for suspected pneumonia with augmentin, however symptoms did not resolve. On  she was then evaluated in the ED at , a CTA showed mild pulmonary congestion, nodular opacities in RUL (edema vs. infection) and she was discharged on a z-pac. She was seen by Dr. Trammell today and he referred her to the ED for evaluation. At , bedside echo showed EF 20-25% (as per ED physician), her BNP was found to be 5007 and she was given lasix 40mg IV once. EKG showed poor R-wave progression.     During her pregnancy, patient reports her BP was well controlled on labetalol and she took aspirin due to risk for pre-eclampsia. She was scheduled for induction and  was performed for failure to progress. Delivery was at term and she is not breast feeding.       18: EP asked to consult on this pleasant patient for severely reduced LVEF 25%, SOB, relieved with IV Lasix.  Denies history of syncope.    TELE: SR 70-80 bpm, no arrhythmias  EKG: Sinus tach 104 bpm          PAST MEDICAL & SURGICAL HISTORY:  HTN (hypertension)  Lipoma of back: S/p removal  H/O  section      MEDICATIONS  (STANDING):  carvedilol 6.25 milliGRAM(s) Oral every 12 hours  furosemide    Tablet 40 milliGRAM(s) Oral daily  influenza   Vaccine 0.5 milliLiter(s) IntraMuscular once  isosorbide   dinitrate Tablet (ISORDIL) 20 milliGRAM(s) Oral three times a day  losartan 50 milliGRAM(s) Oral once  prenatal multivitamin 1 Tablet(s) Oral daily  ranitidine  Oral Tab/Cap - Peds 150 milliGRAM(s) Oral daily    MEDICATIONS  (PRN):  acetaminophen   Tablet .. 650 milliGRAM(s) Oral every 6 hours PRN Temp greater or equal to 38.5C (101.3F), Moderate Pain (4 - 6)  melatonin 5 milliGRAM(s) Oral at bedtime PRN Sleep      Allergies    Chloraprep-pfizer hex prep (Rash; Hives)  No Known Drug Allergies      SOCIAL HISTORY: Denies tobacco, etoh abuse or illicit drug use. , has 3 month old son.    FAMILY HISTORY:  Family history of diabetes mellitus (Father)  Family history of acute myocardial infarction (Father),  age 59  No DM2, HTN, CAD in mother.   NO family history of autoimmune conditions. (19 Sep 2018 22:02)      Vital Signs Last 24 Hrs  T(C): 36.8 (20 Sep 2018 16:27), Max: 36.8 (20 Sep 2018 16:27)  T(F): 98.2 (20 Sep 2018 16:27), Max: 98.2 (20 Sep 2018 16:27)  HR: 94 (20 Sep 2018 16:27) (85 - 105)  BP: 139/101 (20 Sep 2018 16:27) (139/101 - 161/125)  BP(mean): --  RR: 18 (20 Sep 2018 16:27) (18 - 22)  SpO2: 99% (20 Sep 2018 16:27) (96% - 100%)    REVIEW OF SYSTEMS:    CONSTITUTIONAL:  As per HPI.  HEENT:  Eyes:  No diplopia or blurred vision. ENT:  No earache, sore throat or runny nose.  CARDIOVASCULAR:  No pressure, squeezing, strangling, tightness, heaviness or aching about the chest, neck, axilla or epigastrium.  RESPIRATORY:  No cough, shortness of breath, PND or orthopnea after receiving lasix  GASTROINTESTINAL:  No nausea, vomiting or diarrhea.  GENITOURINARY:  No dysuria, frequency or urgency.  MUSCULOSKELETAL:  As per HPI.  SKIN:  No change in skin, hair or nails.  NEUROLOGIC:  No paresthesias, fasciculations, seizures or weakness.  PSYCHIATRIC:  No disorder of thought or mood.  ENDOCRINE:  No heat or cold intolerance, polyuria or polydipsia.  HEMATOLOGICAL:  No easy bruising or bleedings:  .     PHYSICAL EXAMINATION:    GENERAL APPEARANCE:  Pt. is not currently dyspneic, in no distress. Pt. is alert, oriented, and pleasant.  HEENT:  Pupils are normal and react normally. No icterus. Mucous membranes well colored.  NECK:  Supple. No lymphadenopathy. Jugular venous pressure not elevated. Carotids equal.   HEART:   The cardiac impulse has a normal quality. There are no murmurs, rubs or gallops noted  CHEST:  Chest is clear to auscultation. Normal respiratory effort.  ABDOMEN:  Soft and nontender.   EXTREMITIES:  There is no edema.   SKIN:  No rash or significant lesions are noted.    I&O's Summary    20 Sep 2018 07:01  -  20 Sep 2018 17:51  --------------------------------------------------------  IN: 0 mL / OUT: 750 mL / NET: -750 mL        LABS:                        11.1   8.11  )-----------( 414      ( 20 Sep 2018 07:01 )             36.1     09-    139  |  105  |  21  ----------------------------<  86  3.9   |  26  |  0.92    Ca    8.7      20 Sep 2018 07:01  Mg     2.0     -    TPro  7.3  /  Alb  3.1<L>  /  TBili  1.4<H>  /  DBili  x   /  AST  31  /  ALT  64  /  AlkPhos  91  -    LIVER FUNCTIONS - ( 20 Sep 2018 07:01 )  Alb: 3.1 g/dL / Pro: 7.3 gm/dL / ALK PHOS: 91 U/L / ALT: 64 U/L / AST: 31 U/L / GGT: x           PT/INR - ( 19 Sep 2018 17:40 )   PT: 13.4 sec;   INR: 1.24 ratio           CARDIAC MARKERS ( 20 Sep 2018 07:01 )  0.033 ng/mL / x     / x     / x     / x      CARDIAC MARKERS ( 19 Sep 2018 17:40 )  0.030 ng/mL / x     / x     / x     / x            2D echo full report pending  Limited echo at cardio office with LVEF 25%

## 2018-09-21 ENCOUNTER — TRANSCRIPTION ENCOUNTER (OUTPATIENT)
Age: 33
End: 2018-09-21

## 2018-09-21 LAB
ANION GAP SERPL CALC-SCNC: 6 MMOL/L — SIGNIFICANT CHANGE UP (ref 5–17)
BUN SERPL-MCNC: 27 MG/DL — HIGH (ref 7–23)
CALCIUM SERPL-MCNC: 8.6 MG/DL — SIGNIFICANT CHANGE UP (ref 8.5–10.1)
CHLORIDE SERPL-SCNC: 105 MMOL/L — SIGNIFICANT CHANGE UP (ref 96–108)
CO2 SERPL-SCNC: 27 MMOL/L — SIGNIFICANT CHANGE UP (ref 22–31)
CREAT SERPL-MCNC: 0.94 MG/DL — SIGNIFICANT CHANGE UP (ref 0.5–1.3)
GLUCOSE SERPL-MCNC: 90 MG/DL — SIGNIFICANT CHANGE UP (ref 70–99)
HCT VFR BLD CALC: 34.1 % — LOW (ref 34.5–45)
HGB BLD-MCNC: 10.5 G/DL — LOW (ref 11.5–15.5)
MCHC RBC-ENTMCNC: 24.5 PG — LOW (ref 27–34)
MCHC RBC-ENTMCNC: 30.8 GM/DL — LOW (ref 32–36)
MCV RBC AUTO: 79.7 FL — LOW (ref 80–100)
NRBC # BLD: 0 /100 WBCS — SIGNIFICANT CHANGE UP (ref 0–0)
PLATELET # BLD AUTO: 363 K/UL — SIGNIFICANT CHANGE UP (ref 150–400)
POTASSIUM SERPL-MCNC: 3.9 MMOL/L — SIGNIFICANT CHANGE UP (ref 3.5–5.3)
POTASSIUM SERPL-SCNC: 3.9 MMOL/L — SIGNIFICANT CHANGE UP (ref 3.5–5.3)
RBC # BLD: 4.28 M/UL — SIGNIFICANT CHANGE UP (ref 3.8–5.2)
RBC # FLD: 15.9 % — HIGH (ref 10.3–14.5)
SODIUM SERPL-SCNC: 138 MMOL/L — SIGNIFICANT CHANGE UP (ref 135–145)
WBC # BLD: 7 K/UL — SIGNIFICANT CHANGE UP (ref 3.8–10.5)
WBC # FLD AUTO: 7 K/UL — SIGNIFICANT CHANGE UP (ref 3.8–10.5)

## 2018-09-21 PROCEDURE — 93010 ELECTROCARDIOGRAM REPORT: CPT

## 2018-09-21 RX ORDER — SPIRONOLACTONE 25 MG/1
12.5 TABLET, FILM COATED ORAL DAILY
Qty: 0 | Refills: 0 | Status: DISCONTINUED | OUTPATIENT
Start: 2018-09-21 | End: 2018-09-22

## 2018-09-21 RX ORDER — FUROSEMIDE 40 MG
1 TABLET ORAL
Qty: 15 | Refills: 0
Start: 2018-09-21 | End: 2018-10-05

## 2018-09-21 RX ORDER — CARVEDILOL PHOSPHATE 80 MG/1
1 CAPSULE, EXTENDED RELEASE ORAL
Qty: 30 | Refills: 0
Start: 2018-09-21 | End: 2018-10-05

## 2018-09-21 RX ORDER — SPIRONOLACTONE 25 MG/1
0.5 TABLET, FILM COATED ORAL
Qty: 10 | Refills: 0 | OUTPATIENT
Start: 2018-09-21 | End: 2018-10-10

## 2018-09-21 RX ADMIN — Medication 40 MILLIGRAM(S): at 11:50

## 2018-09-21 RX ADMIN — Medication 1 TABLET(S): at 11:50

## 2018-09-21 RX ADMIN — CARVEDILOL PHOSPHATE 6.25 MILLIGRAM(S): 80 CAPSULE, EXTENDED RELEASE ORAL at 06:07

## 2018-09-21 RX ADMIN — LOSARTAN POTASSIUM 100 MILLIGRAM(S): 100 TABLET, FILM COATED ORAL at 06:07

## 2018-09-21 RX ADMIN — Medication 650 MILLIGRAM(S): at 01:26

## 2018-09-21 RX ADMIN — SPIRONOLACTONE 12.5 MILLIGRAM(S): 25 TABLET, FILM COATED ORAL at 17:51

## 2018-09-21 RX ADMIN — CARVEDILOL PHOSPHATE 6.25 MILLIGRAM(S): 80 CAPSULE, EXTENDED RELEASE ORAL at 17:52

## 2018-09-21 RX ADMIN — Medication 5 MILLIGRAM(S): at 22:18

## 2018-09-21 RX ADMIN — Medication 650 MILLIGRAM(S): at 00:46

## 2018-09-21 NOTE — DISCHARGE NOTE ADULT - OTHER SIGNIFICANT FINDINGS
Xray Chest 1 View AP/PA    FINDINGS: The heart is significantly enlarged. The lungs are clear. There   is no acute bony abnormality.    IMPRESSION:    No acute pulmonary disease.       Transthoracic Echocardiogram    Impression     Summary     The left ventricle cavity is mildly dilated.   Mild concentric left ventricular hypertrophy is present.   The left ventricle cavity is mild-moderately dilated.   Estimated left ventricular ejection fraction is 20-25 %.   The left atrium is moderately dilated.   Normal aortic valve structure and function.   Trace aortic regurgitation is present.   Normal appearing mitral valve structure and function.   Moderate (2+) mitral regurgitation is present.   Normal appearing tricuspid valve structure and function.   Moderate (2+) tricuspid valve regurgitation is present.   Mild-moderate pulmonary hypertension.   Normal appearing pulmonic valve structure and function.   Mild pulmonic valvular regurgitation (1+) is present.   Small pericardial effusion is present with no echocardiographic evidence   of tamponade.

## 2018-09-21 NOTE — DISCHARGE NOTE ADULT - PLAN OF CARE
control of blood pressure - Continue blood pressure medications. no shortness of breath or chest pain -You have shortness of breath likely due to cardiomyopathy with unclear cause.  - You were treated with lasix with resolution of your shortness of breath  - Continue on lasix 40mg daily  - Follow up outpatient with your primary care physician in 2-3 days   -Follow up with Dr. Trammell within week.   - Follow up with scheduled appointment with EP, Dr. Hernandez, in 2-3 weeks. -You have shortness of breath likely due to cardiomyopathy with unclear cause.  - You were treated with lasix with resolution of your shortness of breath  - Continue on lasix 40mg daily  - Follow up outpatient with your primary care physician in 2-3 days   -Follow up with Cardiologist Dr. Trammell within week.   - Follow up with scheduled appointment with EP, Dr. Hernandez, in 2-3 weeks.

## 2018-09-21 NOTE — PROGRESS NOTE ADULT - ASSESSMENT
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 Ce' s negative   cont lasix PO   cont  coreg.  Continue losartan.  DC nitrates bc of HA     HTN- meds as stated above.      would ambulate today as long as no symptoms can DC home in AM

## 2018-09-21 NOTE — PROGRESS NOTE ADULT - SUBJECTIVE AND OBJECTIVE BOX
Ms. Silva is a 30 year old female with PMH of HTN who recently delivered (June 2018) her first child and was admitted for SOB determined due to acute HFrEF. She has been optimized and diuresed and feels significantly better today.    /97 HR 71 RR 18  Gen: NAD, well appearing, well nourished  HEENT: Sclera non icteric, membranes moist, no thrush  Neck: No JVD, supple, no bruits  Respiratory: CTAB w/o WRR, unlabored  Cardiovascular: RRR w/o MRG, S1 & S2 present  Peripheral vascular: No varicosities, no edema. 2+ pedal pulses  Derm: Skin warm, dry. No lesions or ulcers. Non jaundiced.   Neuro: Non focal, grossly intact  Musculoskeletal: Moves all extremities, gait is normal  Psych: Cooperative, interactive, pleasant. Normal affect & mood.    Labs & imaging reviewed in full.    TTE 9.20.18  The left ventricle cavity is mildly dilated.  Mild concentric left ventricular hypertrophy is present.  The left ventricle cavity is mild-moderately dilated.  Estimated left ventricular ejection fraction is 20-25 %.  The left atrium is moderately dilated.  Normal aortic valve structure and function.  Trace aortic regurgitation is present.  Normal appearing mitral valve structure and function.  Moderate (2+) mitral regurgitation is present.  Normal appearing tricuspid valve structure and function.  Moderate (2+) tricuspid valve regurgitation is present.  Mild-moderate pulmonary hypertension.  Normal appearing pulmonic valve structure and function.  Mild pulmonic valvular regurgitation (1+) is present.  Small pericardial effusion is present with no echocardiographic evidence of tamponade.    EKG NSR at 80 w/ Diffuse T wave inversions, prolonged QTc of 539

## 2018-09-21 NOTE — DISCHARGE NOTE ADULT - PATIENT PORTAL LINK FT
You can access the [facility] Patient Portal, offered by [facility], by registering with the following website: [email or web address]

## 2018-09-21 NOTE — PROVIDER CONTACT NOTE (OTHER) - SITUATION
OFFICE AWARE OF CONSULT
/113
Left message with answering service.
SPOKE WITH REBECA FROM MEDICAL OFFICE.

## 2018-09-21 NOTE — DISCHARGE NOTE ADULT - CARE PROVIDER_API CALL
Mariella Carreon), Family Practice  [address]  [address]  [place] [address]  Phone: [phone]  Fax: [phone]    Tai Trammell), Cardiovascular Disease; Nuclear Cardiology  [address] [place] [address]  Phone: [phone]  Fax: [phone]

## 2018-09-21 NOTE — DISCHARGE NOTE ADULT - MEDICATION SUMMARY - MEDICATIONS TO STOP TAKING
I will STOP taking the medications listed below when I get home from the hospital:    Azithromycin 5 Day Dose Pack 250 mg oral tablet  -- 250 milligram(s) by mouth once a day    ***COURSE COMPLETED***  -- Do not take dairy products, antacids, or iron preparations within one hour of this medication.  Finish all this medication unless otherwise directed by prescriber. I will STOP taking the medications listed below when I get home from the hospital:    Azithromycin 5 Day Dose Pack 250 mg oral tablet  -- 250 milligram(s) by mouth once a day    ***COURSE COMPLETED***  -- Do not take dairy products, antacids, or iron preparations within one hour of this medication.  Finish all this medication unless otherwise directed by prescriber.    Edarbyclor 40 mg-12.5 mg oral tablet  -- 1 tab(s) by mouth once a day    Patient started this today 9/19    olmesartan 20 mg oral tablet  -- 1 tab(s) by mouth once a day

## 2018-09-21 NOTE — PROGRESS NOTE ADULT - ASSESSMENT
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 Recommend maximization of current medical therapies for HFrEF. We have scheduled a follow up appointment with us for 2-3 weeks in the office for genetic testing. Will follow up on echo after 3 months of optimal medical therapy to determine the appropriateness of AICD implantation. Regarding Life Vest, given the low indication for benefit versus risk, we would not advise initiation of Life Vest at this juncture as the patient has had no ventricular arrhythmias seen on tele thus far and her cardiomyopathy is non ischemic in nature. The prognosis, plan of care, dietary modifications, and medications have been thoroughly discussed with the patient in full.     Thank you for allowing us to participate in the care of your patient.  We will sign off for now. If you require further EP assistance, please feel free to contact us. Thank you as always for involving us in the care of your patient. 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 Recommend maximization of current medical therapies for HFrEF. We have scheduled a follow up appointment with us for 2-3 weeks in the office for genetic testing. Will follow up on echo after 3 months of optimal medical therapy to determine the appropriateness of AICD implantation. Regarding Life Vest, given the low indication for benefit versus risk, we would not advise initiation of Life Vest at this juncture as the patient has had no ventricular arrhythmias seen on tele thus far and her cardiomyopathy is non ischemic in nature. The prognosis, plan of care, dietary modifications, and medications have been thoroughly discussed with the patient in full.     We will sign off for now. If you require further EP assistance, please feel free to contact us. Thank you as always for involving us in the care of your patient.

## 2018-09-21 NOTE — DISCHARGE NOTE ADULT - HOSPITAL COURSE
Pt was seen and examined by Electro Physiology team, a follow up appointment has been scheduled with pt in 2-3 weeks in the office for genetic testing. Will follow up on echo after 3 months of optimal medical therapy to determine the appropriateness of AICD implantation. Regarding Life Vest, given the low indication for benefit versus risk, EP does not advise initiation of Life Vest at this juncture as the patient has had no ventricular arrhythmias seen on tele thus far and her cardiomyopathy is non ischemic in nature. The prognosis, plan of care, dietary modifications, and medications have been thoroughly discussed with the patient in full. 33 yo female with h/o HTN and recent  in 2018, who presented to the ED with shortness of breath x 2 weeks, pt also reported orthopnea and  fever/chills at home, with left lower extremity swelling, no pain. Echo done at pt's Cardiologist's office (Dr. Trammell) showed LVEF of 20-25%. In the ED, BNP was elevated in the 5000s. Pt was started on IV lasix in the ED and admitted for pato-partum vs. other cause of cardiomyopathy. CXR showed cardiomegaly, no pulmonary infiltrates or edema.    For hospital course, pt was continued on IV lasix with improvement in her shortness of breath. RVP obtained was negative. In addition to Medicine team, pt was also followed by Cardiology and Electrophysiology team. Pt's blood pressure was well controlled on carvedilol, losartan and isosorbide nitrate. Isosorbide nitrate was discontinued given headache pain. Pt reported significant improvement in her shortness of breath and extremity edema following IV lasix treatment. Pt is clinically stable for discharge to home today.    Pt was seen and examined by Electro Physiology team, a follow up appointment has been scheduled with pt in 2-3 weeks in the office for genetic testing. Will follow up on echo after 3 months of optimal medical therapy to determine the appropriateness of AICD implantation. Regarding Life Vest, given the low indication for benefit versus risk, EP does not advise initiation of Life Vest at this juncture as the patient has had no ventricular arrhythmias seen on tele thus far and her cardiomyopathy is non ischemic in nature. The prognosis, plan of care, dietary modifications, and medications have been thoroughly discussed with the patient in full.     Pt will continue on PO lasix, Coreg, and Losartan, and with oupatient f/u with Cardiology and EP within 2-3 weeks. Pt should also f/u with her PCP in 2-3 days.    Vital Signs Last 24 Hrs  T(C): 36.6 (21 Sep 2018 16:11), Max: 36.8 (20 Sep 2018 20:50)  T(F): 97.9 (21 Sep 2018 16:11), Max: 98.2 (20 Sep 2018 20:50)  HR: 89 (21 Sep 2018 16:11) (71 - 89)  BP: 138/99 (21 Sep 2018 16:11) (125/80 - 140/98)  RR: 18 (21 Sep 2018 16:11) (18 - 19)  SpO2: 100% (21 Sep 2018 16:11) (98% - 100%)    Constitutional: NAD, awake and alert, well-appearing  HEENT: PERR, EOMI, Normal Hearing, MMM  Neck: Soft and supple, No LAD, No JVD noted  Respiratory: Lungs clear, no wheezing, rales or rhonchi  Cardiovascular: S1 and S2, regular rate and rhythm, no murmurs, gallops or rubs  Gastrointestinal: Bowel Sounds present, soft, nontender, nondistended, no guarding, no rebound  Extremities: No peripheral edema noted  Vascular: 2+ peripheral pulses  Neurological: A/O x 3, no focal deficits  Musculoskeletal: 5/5 strength b/l upper and lower extremities  Skin: No rashes                          10.5   7.00  )-----------( 363      ( 21 Sep 2018 05:23 )             34.1   09-21    138  |  105  |  27<H>  ----------------------------<  90  3.9   |  27  |  0.94    Ca    8.6      21 Sep 2018 05:23  Mg     2.0     -    TPro  7.3  /  Alb  3.1<L>  /  TBili  1.4<H>  /  DBili  x   /  AST  31  /  ALT  64  /  AlkPhos  91  - 31 yo female with h/o HTN and recent  in 2018, who presented to the ED with shortness of breath x 2 weeks, pt also reported orthopnea and  fever/chills at home, with left lower extremity swelling, no pain. Echo done at pt's Cardiologist's office (Dr. Trammell) showed LVEF of 20-25%. In the ED, BNP was elevated in the 5000s. Pt was started on IV lasix in the ED and admitted for pato-partum vs. other cause of cardiomyopathy. CXR showed cardiomegaly, no pulmonary infiltrates or edema.    For hospital course, pt was continued on IV lasix with improvement in her shortness of breath. RVP obtained was negative. In addition to Medicine team, pt was also followed by Cardiology and Electrophysiology team. Pt's blood pressure was well controlled on carvedilol, losartan and isosorbide nitrate. Isosorbide nitrate was discontinued given headache pain. Pt reported significant improvement in her shortness of breath and extremity edema following IV lasix treatment. Pt is clinically stable for discharge to home 18.    Pt was seen and examined by Electrophysiology team, a follow up appointment has been scheduled with pt in 2-3 weeks in the office for genetic testing. Will follow up on echo after 3 months of optimal medical therapy to determine the appropriateness of AICD implantation. Regarding Life Vest, given the low indication for benefit versus risk, EP does not advise initiation of Life Vest at this juncture as the patient has had no ventricular arrhythmias seen on tele thus far and her cardiomyopathy is non ischemic in nature. The prognosis, plan of care, dietary modifications, and medications have been thoroughly discussed with the patient in full.     Pt will continue on PO lasix, Coreg, and Losartan, and with oupatient f/u with Cardiology and EP within 2-3 weeks. Pt should also f/u with her PCP in 2-3 days.    Vital Signs Last 24 Hrs  T(C): 36.6 (21 Sep 2018 16:11), Max: 36.8 (20 Sep 2018 20:50)  T(F): 97.9 (21 Sep 2018 16:11), Max: 98.2 (20 Sep 2018 20:50)  HR: 89 (21 Sep 2018 16:11) (71 - 89)  BP: 138/99 (21 Sep 2018 16:11) (125/80 - 140/98)  RR: 18 (21 Sep 2018 16:11) (18 - 19)  SpO2: 100% (21 Sep 2018 16:11) (98% - 100%)    Constitutional: NAD, awake and alert, well-appearing  HEENT: PERR, EOMI, Normal Hearing, MMM  Neck: Soft and supple, No LAD, No JVD noted  Respiratory: Lungs clear, no wheezing, rales or rhonchi  Cardiovascular: S1 and S2, regular rate and rhythm, no murmurs, gallops or rubs  Gastrointestinal: Bowel Sounds present, soft, nontender, nondistended, no guarding, no rebound  Extremities: No peripheral edema noted  Vascular: 2+ peripheral pulses  Neurological: A/O x 3, no focal deficits  Musculoskeletal: 5/5 strength b/l upper and lower extremities  Skin: No rashes                          10.5   7.00  )-----------( 363      ( 21 Sep 2018 05:23 )             34.1   09-21    138  |  105  |  27<H>  ----------------------------<  90  3.9   |  27  |  0.94    Ca    8.6      21 Sep 2018 05:23  Mg     2.0     -    TPro  7.3  /  Alb  3.1<L>  /  TBili  1.4<H>  /  DBili  x   /  AST  31  /  ALT  64  /  AlkPhos  91  -

## 2018-09-21 NOTE — DISCHARGE NOTE ADULT - MEDICATION SUMMARY - MEDICATIONS TO TAKE
I will START or STAY ON the medications listed below when I get home from the hospital:    ibuprofen 600 mg oral tablet  -- 1 tab(s) by mouth every 6 hours, As needed, Mild pain or headache  -- Indication: For Pain    olmesartan 20 mg oral tablet  -- 1 tab(s) by mouth once a day  -- Indication: For HTN (hypertension)    Edarbyclor 40 mg-12.5 mg oral tablet  -- 1 tab(s) by mouth once a day    Patient started this today 9/19  -- Indication: For HTN (hypertension)    carvedilol 6.25 mg oral tablet  -- 1 tab(s) by mouth every 12 hours  -- Indication: For HTN (hypertension)    furosemide 40 mg oral tablet  -- 1 tab(s) by mouth once a day  -- Indication: For Cardiomyopathy    raNITIdine 150 mg oral tablet  -- 1 tab(s) by mouth once a day    Patient started this today 9/19  -- Indication: For GI    Prenatal 1 oral capsule  -- Indication: For supplement    NexIUM 20 mg oral powder for reconstitution, delayed release  -- 1 each by mouth once a day  -- Indication: For GI    Vitamin D3 1000 intl units oral tablet  -- 1 tab(s) by mouth once a day  -- Indication: For supplement I will START or STAY ON the medications listed below when I get home from the hospital:    spironolactone 25 mg oral tablet  -- 0.5 tab(s) by mouth once a day   -- It is very important that you take or use this exactly as directed.  Do not skip doses or discontinue unless directed by your doctor.  May cause drowsiness or dizziness.    -- Indication: For HTN (hypertension)    ibuprofen 600 mg oral tablet  -- 1 tab(s) by mouth every 6 hours, As needed, Mild pain or headache  -- Indication: For Pain    olmesartan 20 mg oral tablet  -- 1 tab(s) by mouth once a day  -- Indication: For HTN (hypertension)    Edarbyclor 40 mg-12.5 mg oral tablet  -- 1 tab(s) by mouth once a day    Patient started this today 9/19  -- Indication: For HTN (hypertension)    carvedilol 6.25 mg oral tablet  -- 1 tab(s) by mouth every 12 hours  -- Indication: For HTN (hypertension)    furosemide 40 mg oral tablet  -- 1 tab(s) by mouth once a day  -- Indication: For Cardiomyopathy    raNITIdine 150 mg oral tablet  -- 1 tab(s) by mouth once a day    Patient started this today 9/19  -- Indication: For GI    Prenatal 1 oral capsule  -- Indication: For supplement    NexIUM 20 mg oral powder for reconstitution, delayed release  -- 1 each by mouth once a day  -- Indication: For GI    Vitamin D3 1000 intl units oral tablet  -- 1 tab(s) by mouth once a day  -- Indication: For supplement I will START or STAY ON the medications listed below when I get home from the hospital:    spironolactone 25 mg oral tablet  -- 0.5 tab(s) by mouth once a day   -- It is very important that you take or use this exactly as directed.  Do not skip doses or discontinue unless directed by your doctor.  May cause drowsiness or dizziness.    -- Indication: For HTN (hypertension)    ibuprofen 600 mg oral tablet  -- 1 tab(s) by mouth every 6 hours, As needed, Mild pain or headache  -- Indication: For Pain    losartan 100 mg oral tablet  -- 1 tab(s) by mouth once a day  -- Indication: For HTN (hypertension)    carvedilol 6.25 mg oral tablet  -- 1 tab(s) by mouth every 12 hours  -- Indication: For HTN (hypertension)    furosemide 40 mg oral tablet  -- 1 tab(s) by mouth once a day  -- Indication: For Cardiomyopathy    raNITIdine 150 mg oral tablet  -- 1 tab(s) by mouth once a day    Patient started this today 9/19  -- Indication: For GI    Prenatal 1 oral capsule  -- Indication: For supplement    NexIUM 20 mg oral powder for reconstitution, delayed release  -- 1 each by mouth once a day  -- Indication: For GI    Vitamin D3 1000 intl units oral tablet  -- 1 tab(s) by mouth once a day  -- Indication: For supplement

## 2018-09-21 NOTE — PROGRESS NOTE ADULT - SUBJECTIVE AND OBJECTIVE BOX
Patient is a 32y old  Female who presents with a chief complaint of SOB (20 Sep 2018 18:23)      9/21- feels much better  MEDICATIONS  (STANDING):  carvedilol 6.25 milliGRAM(s) Oral every 12 hours  furosemide    Tablet 40 milliGRAM(s) Oral daily  influenza   Vaccine 0.5 milliLiter(s) IntraMuscular once  isosorbide   dinitrate Tablet (ISORDIL) 20 milliGRAM(s) Oral three times a day  losartan 100 milliGRAM(s) Oral daily  prenatal multivitamin 1 Tablet(s) Oral daily  ranitidine  Oral Tab/Cap - Peds 150 milliGRAM(s) Oral daily    MEDICATIONS  (PRN):  acetaminophen   Tablet .. 650 milliGRAM(s) Oral every 6 hours PRN Temp greater or equal to 38.5C (101.3F), Moderate Pain (4 - 6)  melatonin 5 milliGRAM(s) Oral at bedtime PRN Sleep            Vital Signs Last 24 Hrs  T(C): 36.5 (21 Sep 2018 04:59), Max: 36.8 (20 Sep 2018 16:27)  T(F): 97.7 (21 Sep 2018 04:59), Max: 98.2 (20 Sep 2018 16:27)  HR: 75 (21 Sep 2018 04:59) (75 - 97)  BP: 125/80 (21 Sep 2018 04:59) (125/80 - 150/106)  BP(mean): --  RR: 19 (21 Sep 2018 04:59) (18 - 19)  SpO2: 99% (21 Sep 2018 04:59) (96% - 99%)            INTERPRETATION OF TELEMETRY:  SR   ECG:        LABS:                        10.5   7.00  )-----------( 363      ( 21 Sep 2018 05:23 )             34.1     09-21    138  |  105  |  27<H>  ----------------------------<  90  3.9   |  27  |  0.94    Ca    8.6      21 Sep 2018 05:23  Mg     2.0     09-19    TPro  7.3  /  Alb  3.1<L>  /  TBili  1.4<H>  /  DBili  x   /  AST  31  /  ALT  64  /  AlkPhos  91  09-20    CARDIAC MARKERS ( 20 Sep 2018 07:01 )  0.033 ng/mL / x     / x     / x     / x      CARDIAC MARKERS ( 19 Sep 2018 17:40 )  0.030 ng/mL / x     / x     / x     / x          PT/INR - ( 19 Sep 2018 17:40 )   PT: 13.4 sec;   INR: 1.24 ratio             I&O's Summary    20 Sep 2018 07:01  -  21 Sep 2018 07:00  --------------------------------------------------------  IN: 0 mL / OUT: 1550 mL / NET: -1550 mL      BNP  RADIOLOGY & ADDITIONAL STUDIES:

## 2018-09-21 NOTE — DISCHARGE NOTE ADULT - ADDITIONAL INSTRUCTIONS
Follow up with Dr. Valencia in 2-3 days  Follow up with Dr. Trammell within 1 week  Follow up with EP, Dr. Bashir. Appointment already set for 2-3 weeks

## 2018-09-21 NOTE — PROGRESS NOTE ADULT - SUBJECTIVE AND OBJECTIVE BOX
Pt has been seen and examined with FP resident, resident supervised agree with a/p       Patient is a 32y old  Female who presents with a chief complaint of SOB (20 Sep 2018 07:34)          PHYSICAL EXAM:    general- comfortable   -[id number],cta  -cvs-[id number] normal   -p/a-soft,bs+  -extremity- no asymmetrical swelling noted   -cns- non focal         A/P    #Acute decompensated systolic heart failure   -compensated now   -d/c today with further management as an outpt     #time spent 70 minutes

## 2018-09-21 NOTE — DISCHARGE NOTE ADULT - CARE PLAN
Principal Discharge DX:	Exertional dyspnea  Goal:	no shortness of breath or chest pain  Assessment and plan of treatment:	-You have shortness of breath likely due to cardiomyopathy with unclear cause.  - You were treated with lasix with resolution of your shortness of breath  - Continue on lasix 40mg daily  - Follow up outpatient with your primary care physician in 2-3 days   -Follow up with Dr. Trammell within week.   - Follow up with scheduled appointment with EP, Dr. Hernandez, in 2-3 weeks.  Secondary Diagnosis:	HTN (hypertension)  Goal:	control of blood pressure  Assessment and plan of treatment:	- Continue blood pressure medications. Principal Discharge DX:	Exertional dyspnea  Goal:	no shortness of breath or chest pain  Assessment and plan of treatment:	-You have shortness of breath likely due to cardiomyopathy with unclear cause.  - You were treated with lasix with resolution of your shortness of breath  - Continue on lasix 40mg daily  - Follow up outpatient with your primary care physician in 2-3 days   -Follow up with Cardiologist Dr. Trammell within week.   - Follow up with scheduled appointment with EP, Dr. Hernandez, in 2-3 weeks.  Secondary Diagnosis:	HTN (hypertension)  Goal:	control of blood pressure  Assessment and plan of treatment:	- Continue blood pressure medications.

## 2018-09-22 VITALS — WEIGHT: 246.04 LBS

## 2018-09-22 RX ORDER — LOSARTAN POTASSIUM 100 MG/1
1 TABLET, FILM COATED ORAL
Qty: 15 | Refills: 0
Start: 2018-09-22 | End: 2018-10-06

## 2018-09-22 RX ORDER — OLMESARTAN MEDOXOMIL 5 MG/1
1 TABLET, FILM COATED ORAL
Qty: 0 | Refills: 0 | COMMUNITY

## 2018-09-22 RX ORDER — SPIRONOLACTONE 25 MG/1
0.5 TABLET, FILM COATED ORAL
Qty: 10 | Refills: 0
Start: 2018-09-22 | End: 2018-10-11

## 2018-09-22 RX ADMIN — LOSARTAN POTASSIUM 100 MILLIGRAM(S): 100 TABLET, FILM COATED ORAL at 05:21

## 2018-09-22 RX ADMIN — SPIRONOLACTONE 12.5 MILLIGRAM(S): 25 TABLET, FILM COATED ORAL at 05:21

## 2018-09-22 RX ADMIN — CARVEDILOL PHOSPHATE 6.25 MILLIGRAM(S): 80 CAPSULE, EXTENDED RELEASE ORAL at 05:21

## 2018-09-22 NOTE — PROGRESS NOTE ADULT - SUBJECTIVE AND OBJECTIVE BOX
Cardiology Progress Note    HPI: This is a 33 y/o F  (s/p  2018 for failure to progress after induction) with PMHx significant for HTN that presents with SOB. Reports symptoms began approximately 2 weeks ago, initially noting that she felt fatigued and short of breath on exertion. Gradually progressive symptoms, associated with cough which became blood tinged. Positive orthopnea and reports waking up at night due to anxiety. Describes intermittent pressured chest tightness but no overt CP, palpitations. In the last week she has had intermittent fever but no chills, Tmax 100.8F. In the last 10 days she has had loose stools but no diarrhea. Describes left knee swelling in the past week as well.   Near the onset of her symptoms, patient noted her blood pressure was elevated and went to urgent care and then subsequently to her PCP. Was treated with antibiotics for suspected pneumonia with augmentin, however symptoms did not resolve. On  she was then evaluated in the ED at , a CTA showed mild pulmonary congestion, nodular opacities in RUL (edema vs. infection) and she was discharged on a z-pac. She was seen by Dr. Trammell today and he referred her to the ED for evaluation. At , bedside echo showed EF 20-25% (as per ED physician), her BNP was found to be 5007 and she was given lasix 40mg IV once. EKG showed poor R-wave progression.   During her pregnancy, patient reports her BP was well controlled on labetalol and she took aspirin due to risk for pre-eclampsia. She was scheduled for induction and  was performed for failure to progress. Delivery was at term and she is not breast feeding.   Her father has history of CAD, MI ( at 59), HTN, DM2  No DM2, HTN, CAD in mother.   NO family history of autoimmune conditions. (19 Sep 2018 22:02)    - Lying flat. Feels much better since admission. No CP/SOB. Had a brief cough that is now resolved.   HA also resolved. Ambulating. Case d/w pt and mother. \  SR on tele.     PAST MEDICAL & SURGICAL HISTORY:  HTN (hypertension)  Lipoma of back: S/p removal  H/O  section    Allergies  Chloraprep-pfizer hex prep (Rash; Hives)  No Known Drug Allergies    SOCIAL HISTORY: Denies tobacco, etoh abuse or illicit drug use    FAMILY HISTORY: Family history of diabetes mellitus (Father)  Family history of acute myocardial infarction (Father)    MEDICATIONS  (STANDING):  carvedilol 6.25 milliGRAM(s) Oral every 12 hours  furosemide    Tablet 40 milliGRAM(s) Oral daily  influenza   Vaccine 0.5 milliLiter(s) IntraMuscular once  losartan 100 milliGRAM(s) Oral daily  prenatal multivitamin 1 Tablet(s) Oral daily  ranitidine  Oral Tab/Cap - Peds 150 milliGRAM(s) Oral daily  spironolactone 12.5 milliGRAM(s) Oral daily    MEDICATIONS  (PRN):  acetaminophen   Tablet .. 650 milliGRAM(s) Oral every 6 hours PRN Temp greater or equal to 38.5C (101.3F), Moderate Pain (4 - 6)  melatonin 5 milliGRAM(s) Oral at bedtime PRN Sleep      Vital Signs Last 24 Hrs  T(C): 37 (22 Sep 2018 04:45), Max: 37 (22 Sep 2018 04:45)  T(F): 98.6 (22 Sep 2018 04:45), Max: 98.6 (22 Sep 2018 04:45)  HR: 86 (22 Sep 2018 04:45) (71 - 89)  BP: 141/95 (22 Sep 2018 04:45) (128/93 - 141/95)  BP(mean): --  RR: 18 (22 Sep 2018 04:45) (18 - 18)  SpO2: 97% (22 Sep 2018 04:45) (97% - 100%)    REVIEW OF SYSTEMS:    CONSTITUTIONAL:  As per HPI.  HEENT:  Eyes:  No diplopia or blurred vision. ENT:  No earache, sore throat or runny nose.  CARDIOVASCULAR:  No pressure, squeezing, strangling, tightness, heaviness or aching about the chest, neck, axilla or epigastrium.  RESPIRATORY:  +SOB.   GASTROINTESTINAL:  No nausea, vomiting or diarrhea.  GENITOURINARY:  No dysuria, frequency or urgency.  MUSCULOSKELETAL:  As per HPI.  SKIN:  No change in skin, hair or nails.  NEUROLOGIC:  No paresthesias, fasciculations, seizures or weakness.    PHYSICAL EXAMINATION:    GENERAL APPEARANCE:  Pt. is not currently dyspneic, in no distress. Pt. is alert, oriented, and pleasant.  HEENT:  Pupils are normal and react normally. No icterus. Mucous membranes well colored.  NECK:  Supple. No lymphadenopathy. Jugular venous pressure not elevated. Carotids equal.   HEART:   The cardiac impulse has a normal quality. There are no murmurs, rubs or gallops noted  CHEST:  Chest is clear to auscultation. Normal respiratory effort.  ABDOMEN:  Soft and nontender.   EXTREMITIES:  There is no edema.     I&O's Summary      LABS:                        10.5   7.00  )-----------( 363      ( 21 Sep 2018 05:23 )             34.1         138  |  105  |  27<H>  ----------------------------<  90  3.9   |  27  |  0.94    Ca    8.6      21 Sep 2018 05:23    EKG: < from: 12 Lead ECG (18 @ 07:05) >  Normal sinus rhythm  T wave abnormality, consider anterior ischemia  Prolonged QT  Abnormal ECG    TELEMETRY: SR    CARDIAC TESTS: < from: Transthoracic Echocardiogram (18 @ 09:13) >  The left ventricle cavity is mildly dilated.   Mild concentric left ventricular hypertrophy is present.   The left ventricle cavity is mild-moderately dilated.   Estimated left ventricular ejection fraction is 20-25 %.   The left atrium is moderately dilated.   Normal aortic valve structure and function.   Trace aortic regurgitation is present.   Normal appearing mitral valve structure and function.   Moderate (2+) mitral regurgitation is present.   Normal appearing tricuspid valve structure and function.   Moderate (2+) tricuspid valve regurgitation is present.   Mild-moderate pulmonary hypertension.   Normal appearing pulmonic valve structure and function.   Mild pulmonic valvular regurgitation (1+) is present.   Small pericardial effusion is present with no echocardiographic evidence   of tamponade.    RADIOLOGY & ADDITIONAL STUDIES: CXR- [facility].     ASSESSMENT & PLAN:

## 2018-09-22 NOTE — PROGRESS NOTE ADULT - ASSESSMENT
A/P: 31 y/o F  (s/p  2018 for failure to progress after induction) with PMHx significant for HTN that presents with SOB.  Pt found to have a dilated CMP.    1. Dilated CMP- nonischemic- secondary to peripartum. Ce' s negative   Cont lasix PO, core, losartan.   No further nitrates at this time.    Wt decreased from 253 to 248.     2. HTN- meds as stated above.    3. DVT proph. Ambulate today as long as no symptoms can DC home today.     4. Follow-up- will see Dr. Trammell next week.

## 2018-09-25 DIAGNOSIS — I11.0 HYPERTENSIVE HEART DISEASE WITH HEART FAILURE: ICD-10-CM

## 2018-09-25 DIAGNOSIS — I42.0 DILATED CARDIOMYOPATHY: ICD-10-CM

## 2018-09-25 DIAGNOSIS — Z82.49 FAMILY HISTORY OF ISCHEMIC HEART DISEASE AND OTHER DISEASES OF THE CIRCULATORY SYSTEM: ICD-10-CM

## 2018-09-25 DIAGNOSIS — R06.02 SHORTNESS OF BREATH: ICD-10-CM

## 2018-09-25 DIAGNOSIS — R00.0 TACHYCARDIA, UNSPECIFIED: ICD-10-CM

## 2018-09-25 DIAGNOSIS — I45.81 LONG QT SYNDROME: ICD-10-CM

## 2018-09-25 DIAGNOSIS — I50.21 ACUTE SYSTOLIC (CONGESTIVE) HEART FAILURE: ICD-10-CM

## 2018-09-25 DIAGNOSIS — I08.3 COMBINED RHEUMATIC DISORDERS OF MITRAL, AORTIC AND TRICUSPID VALVES: ICD-10-CM

## 2018-09-25 DIAGNOSIS — I27.20 PULMONARY HYPERTENSION, UNSPECIFIED: ICD-10-CM

## 2018-09-25 LAB — VIT B1 SERPL-MCNC: 92.7 NMOL/L — SIGNIFICANT CHANGE UP (ref 66.5–200)

## 2018-10-11 ENCOUNTER — APPOINTMENT (OUTPATIENT)
Dept: OBGYN | Facility: CLINIC | Age: 33
End: 2018-10-11
Payer: COMMERCIAL

## 2018-10-11 VITALS
OXYGEN SATURATION: 97 % | BODY MASS INDEX: 36.88 KG/M2 | WEIGHT: 235 LBS | HEIGHT: 67 IN | RESPIRATION RATE: 16 BRPM | DIASTOLIC BLOOD PRESSURE: 72 MMHG | SYSTOLIC BLOOD PRESSURE: 122 MMHG | HEART RATE: 72 BPM

## 2018-10-11 PROCEDURE — 99395 PREV VISIT EST AGE 18-39: CPT

## 2018-10-17 ENCOUNTER — APPOINTMENT (OUTPATIENT)
Dept: ELECTROPHYSIOLOGY | Facility: CLINIC | Age: 33
End: 2018-10-17
Payer: COMMERCIAL

## 2018-10-17 ENCOUNTER — NON-APPOINTMENT (OUTPATIENT)
Age: 33
End: 2018-10-17

## 2018-10-17 VITALS — SYSTOLIC BLOOD PRESSURE: 156 MMHG | DIASTOLIC BLOOD PRESSURE: 102 MMHG | HEART RATE: 75 BPM

## 2018-10-17 VITALS — BODY MASS INDEX: 36.88 KG/M2 | HEIGHT: 67 IN | WEIGHT: 235 LBS

## 2018-10-17 PROCEDURE — 99205 OFFICE O/P NEW HI 60 MIN: CPT

## 2018-10-17 PROCEDURE — 93000 ELECTROCARDIOGRAM COMPLETE: CPT

## 2018-10-17 RX ORDER — MULTIVIT 47/IRON/FOLATE 1/DHA 27-1-300MG
27-0.6-0.4-3 CAPSULE ORAL
Qty: 28 | Refills: 6 | Status: DISCONTINUED | COMMUNITY
Start: 2017-11-01 | End: 2018-10-17

## 2018-10-17 RX ORDER — CHROMIUM 200 MCG
TABLET ORAL
Refills: 0 | Status: DISCONTINUED | COMMUNITY
End: 2018-10-17

## 2018-10-17 RX ORDER — METRONIDAZOLE 500 MG/1
500 TABLET ORAL TWICE DAILY
Qty: 14 | Refills: 0 | Status: DISCONTINUED | COMMUNITY
Start: 2018-09-06 | End: 2018-10-17

## 2018-10-17 RX ORDER — ASPIRIN 81 MG/1
81 TABLET, COATED ORAL DAILY
Qty: 30 | Refills: 3 | Status: DISCONTINUED | COMMUNITY
Start: 2017-11-02 | End: 2018-10-17

## 2018-10-17 RX ORDER — NYSTATIN 100MM UNIT
POWDER (EA) MISCELLANEOUS
Qty: 3 | Refills: 0 | Status: DISCONTINUED | COMMUNITY
Start: 2018-07-10 | End: 2018-10-17

## 2018-10-17 RX ORDER — OXYCODONE 5 MG/1
5 TABLET ORAL
Qty: 10 | Refills: 0 | Status: DISCONTINUED | COMMUNITY
Start: 2018-06-25 | End: 2018-10-17

## 2018-10-17 RX ORDER — LABETALOL HYDROCHLORIDE 100 MG/1
100 TABLET, FILM COATED ORAL
Qty: 60 | Refills: 2 | Status: DISCONTINUED | COMMUNITY
Start: 2018-02-20 | End: 2018-10-17

## 2018-12-20 LAB — COMPREHENSIVE CARDIOMYOPATHY PANEL: ABNORMAL

## 2018-12-26 ENCOUNTER — APPOINTMENT (OUTPATIENT)
Dept: ELECTROPHYSIOLOGY | Facility: CLINIC | Age: 33
End: 2018-12-26
Payer: COMMERCIAL

## 2018-12-26 VITALS
OXYGEN SATURATION: 100 % | TEMPERATURE: 97.4 F | HEART RATE: 74 BPM | DIASTOLIC BLOOD PRESSURE: 104 MMHG | SYSTOLIC BLOOD PRESSURE: 147 MMHG

## 2018-12-26 PROCEDURE — 99215 OFFICE O/P EST HI 40 MIN: CPT

## 2019-01-15 NOTE — ASSESSMENT
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 \par \par Discussed the availabilty of genetic counseling. \par Will obtain repeat EF evaluation from Dr. Trammell office.

## 2019-01-15 NOTE — PHYSICAL EXAM
[General Appearance - Well Developed] : well developed [Normal Appearance] : normal appearance [Well Groomed] : well groomed [General Appearance - Well Nourished] : well nourished [No Deformities] : no deformities [General Appearance - In No Acute Distress] : no acute distress [Normal Conjunctiva] : the conjunctiva exhibited no abnormalities [Eyelids - No Xanthelasma] : the eyelids demonstrated no xanthelasmas [Normal Oral Mucosa] : normal oral mucosa [No Oral Pallor] : no oral pallor [No Oral Cyanosis] : no oral cyanosis [Normal Jugular Venous A Waves Present] : normal jugular venous A waves present [Normal Jugular Venous V Waves Present] : normal jugular venous V waves present [No Jugular Venous Delaney A Waves] : no jugular venous delaney A waves [Respiration, Rhythm And Depth] : normal respiratory rhythm and effort [Exaggerated Use Of Accessory Muscles For Inspiration] : no accessory muscle use [Auscultation Breath Sounds / Voice Sounds] : lungs were clear to auscultation bilaterally [Heart Rate And Rhythm] : heart rate and rhythm were normal [Heart Sounds] : normal S1 and S2 [Murmurs] : no murmurs present [Abdomen Soft] : soft [Abdomen Tenderness] : non-tender [Abdomen Mass (___ Cm)] : no abdominal mass palpated [Abnormal Walk] : normal gait [Gait - Sufficient For Exercise Testing] : the gait was sufficient for exercise testing [Nail Clubbing] : no clubbing of the fingernails [Cyanosis, Localized] : no localized cyanosis [Petechial Hemorrhages (___cm)] : no petechial hemorrhages [Skin Color & Pigmentation] : normal skin color and pigmentation [] : no rash [No Venous Stasis] : no venous stasis [Skin Lesions] : no skin lesions [No Skin Ulcers] : no skin ulcer [No Xanthoma] : no  xanthoma was observed [Oriented To Time, Place, And Person] : oriented to person, place, and time [Affect] : the affect was normal [Mood] : the mood was normal [No Anxiety] : not feeling anxious

## 2019-01-15 NOTE — HISTORY OF PRESENT ILLNESS
[FreeTextEntry1] : This is a 33-year-old woman status post , 2018 complicated by progressive dyspnea and diagnosed with a postpartum cardiomyopathy. He presented in 2018 with worsening dyspnea and was found to have cardiomegaly and an ejection fraction of 20-25%.she was started on guidelines directed medical therapy, including Corsartan and is following up today. \par \par She reports that she is feeling much better. LUCAS LOAIZA  denies chest pain, chest pressure, shortness of breath, lightheadedness, dizziness, palpitations, syncope, presyncope, orthopnea, PND, or edema.

## 2019-02-18 ENCOUNTER — TRANSCRIPTION ENCOUNTER (OUTPATIENT)
Age: 34
End: 2019-02-18

## 2019-03-07 ENCOUNTER — APPOINTMENT (OUTPATIENT)
Dept: ELECTROPHYSIOLOGY | Facility: CLINIC | Age: 34
End: 2019-03-07
Payer: COMMERCIAL

## 2019-03-07 ENCOUNTER — NON-APPOINTMENT (OUTPATIENT)
Age: 34
End: 2019-03-07

## 2019-03-07 VITALS
BODY MASS INDEX: 36.1 KG/M2 | DIASTOLIC BLOOD PRESSURE: 90 MMHG | SYSTOLIC BLOOD PRESSURE: 130 MMHG | HEART RATE: 82 BPM | HEIGHT: 67 IN | WEIGHT: 230 LBS

## 2019-03-07 PROCEDURE — 99215 OFFICE O/P EST HI 40 MIN: CPT

## 2019-03-07 RX ORDER — LOSARTAN POTASSIUM 100 MG/1
100 TABLET, FILM COATED ORAL
Refills: 0 | Status: DISCONTINUED | COMMUNITY
End: 2019-03-07

## 2019-03-07 NOTE — HISTORY OF PRESENT ILLNESS
[FreeTextEntry1] : This is a 33-year-old woman status post , 2018 complicated by progressive dyspnea and diagnosed with a postpartum cardiomyopathy. She presented in 2018 with worsening dyspnea and was found to have cardiomegaly and an ejection fraction of 20-25%.she was started on guidelines directed medical therapy, including Corsartan and is following up today. Echo recently demonstrates recovery of EF to 55-60%\par \par She reports that she is feeling much better. LUCAS LOAIZA  denies chest pain, chest pressure, shortness of breath, lightheadedness, dizziness, palpitations, syncope, presyncope, orthopnea, PND, or edema.

## 2019-03-07 NOTE — ASSESSMENT
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 She presents for followup with improvement in clinical symptoms and tolerance of her guideline directed medical therapy.\par \par Recent Echo and MUGA demonstrate return to normal EF% would recommend continuation of current drugs for BP management. \par \par Follow up PRN.

## 2019-05-19 ENCOUNTER — TRANSCRIPTION ENCOUNTER (OUTPATIENT)
Age: 34
End: 2019-05-19

## 2019-08-31 ENCOUNTER — TRANSCRIPTION ENCOUNTER (OUTPATIENT)
Age: 34
End: 2019-08-31

## 2019-09-09 ENCOUNTER — TRANSCRIPTION ENCOUNTER (OUTPATIENT)
Age: 34
End: 2019-09-09

## 2019-11-26 ENCOUNTER — APPOINTMENT (OUTPATIENT)
Dept: OBGYN | Facility: CLINIC | Age: 34
End: 2019-11-26

## 2019-12-04 ENCOUNTER — APPOINTMENT (OUTPATIENT)
Dept: OBGYN | Facility: CLINIC | Age: 34
End: 2019-12-04
Payer: COMMERCIAL

## 2019-12-04 VITALS
WEIGHT: 238 LBS | BODY MASS INDEX: 37.35 KG/M2 | OXYGEN SATURATION: 98 % | HEIGHT: 67 IN | DIASTOLIC BLOOD PRESSURE: 80 MMHG | RESPIRATION RATE: 19 BRPM | SYSTOLIC BLOOD PRESSURE: 130 MMHG | HEART RATE: 82 BPM

## 2019-12-04 PROCEDURE — 99395 PREV VISIT EST AGE 18-39: CPT

## 2019-12-04 NOTE — COUNSELING
[Nutrition] : nutrition [Breast Self Exam] : breast self exam [Vitamins/Supplements] : vitamins/supplements [Exercise] : exercise [Weight Management] : weight management [Medication Management] : medication management [Contraception] : contraception

## 2019-12-19 ENCOUNTER — TRANSCRIPTION ENCOUNTER (OUTPATIENT)
Age: 34
End: 2019-12-19

## 2020-06-10 ENCOUNTER — TRANSCRIPTION ENCOUNTER (OUTPATIENT)
Age: 35
End: 2020-06-10

## 2020-06-14 ENCOUNTER — TRANSCRIPTION ENCOUNTER (OUTPATIENT)
Age: 35
End: 2020-06-14

## 2020-09-10 NOTE — ED ADULT NURSE NOTE - NSHISCREENINGQ1_ED_A_ED
Your exam today shows that you do not have a urinary tract infection.  You exam has not changed from your previous visit.  He feels your abdominal pain is due to the constipation and advised avoiding the Senna, and taking Miralax once or twice daily, along with a stool softener like Docusate Sodium 100mg 2-3 times daily.      Dr. Thompson discussed the same treatment options:    1) Do nothing - you do not harm yourself by not doing anything at this time.    2) Surgery - Surgically you have 2 options              A) Vaginal hysterectomy and pelvic reconstruction: working vaginally, Dr. Thompson would remove your cervix and uterus, re-support the top of the vagina, and repair your vaginal walls and opening using your body's own tissue and dissolvable suture (no mesh).                B) Vaginal closure (colpocleisis) with sling: Working vaginally Dr. Thompson would close off the vaginal canal by sewing the front and back walls of the vagina.  From the outside, the vagina would appear normal, but the vaginal length would be foreshortened and vaginal intercourse would no longer be an option.  A colpocleisis can be carried out more quickly than the vaginal reconstructive procedure.      Dr. Thompson advised the easiest procedure with the easiest recovery would be the vaginal closure.  Surgery will take about an hour and you will be in the hospital overnight, possibly 2 days.  After surgery you may have some vaginal spotting and discomfort.  You may go home with a catheter, and it may take awhile for your bladder function to return.     You will be instructed to take a stool softener (docusate sodium 100mg) daily, starting one week before your surgery.  Continue taking your stool softener (docusate sodium 100mg) twice daily for 12 weeks post op.    You may take a single dose of Miralax if you are not able to move your bowels with the stool softener alone.  If you continue to have problems with constipation, please try a combination of  Miralax and Gatorade G2 (lower sugar).   Mix 2 heaping tablespoons (or one capful) of Miralax in 8oz of Gatorade G2 and drink.  Repeat every 2 hours until your bowels are moving, then stop.*    We would ask that you avoid lifting, pushing or pulling greater than 5-pounds for 2 weeks after surgery.       Dr. Thompson advised the best thing would be for you to get your bowels moving better with the Miralax and Docusate Sodium, as advised above.  If you would like to proceed with surgery, you may give our office a call, and we will facilitate scheduling.     No

## 2020-09-12 ENCOUNTER — TRANSCRIPTION ENCOUNTER (OUTPATIENT)
Age: 35
End: 2020-09-12

## 2020-09-22 ENCOUNTER — TRANSCRIPTION ENCOUNTER (OUTPATIENT)
Age: 35
End: 2020-09-22

## 2020-12-08 ENCOUNTER — APPOINTMENT (OUTPATIENT)
Dept: OBGYN | Facility: CLINIC | Age: 35
End: 2020-12-08
Payer: COMMERCIAL

## 2020-12-08 VITALS
SYSTOLIC BLOOD PRESSURE: 148 MMHG | BODY MASS INDEX: 36.26 KG/M2 | WEIGHT: 231 LBS | HEIGHT: 67 IN | DIASTOLIC BLOOD PRESSURE: 100 MMHG

## 2020-12-08 DIAGNOSIS — Z01.419 ENCOUNTER FOR GYNECOLOGICAL EXAMINATION (GENERAL) (ROUTINE) W/OUT ABNORMAL FINDINGS: ICD-10-CM

## 2020-12-08 PROCEDURE — 99072 ADDL SUPL MATRL&STAF TM PHE: CPT

## 2020-12-08 PROCEDURE — 99395 PREV VISIT EST AGE 18-39: CPT

## 2020-12-10 LAB — HPV HIGH+LOW RISK DNA PNL CVX: NOT DETECTED

## 2020-12-23 PROBLEM — Z01.419 ENCOUNTER FOR ANNUAL ROUTINE GYNECOLOGICAL EXAMINATION: Status: RESOLVED | Noted: 2018-10-11 | Resolved: 2020-12-23

## 2021-07-21 ENCOUNTER — TRANSCRIPTION ENCOUNTER (OUTPATIENT)
Age: 36
End: 2021-07-21

## 2021-08-05 ENCOUNTER — TRANSCRIPTION ENCOUNTER (OUTPATIENT)
Age: 36
End: 2021-08-05

## 2021-10-06 ENCOUNTER — TRANSCRIPTION ENCOUNTER (OUTPATIENT)
Age: 36
End: 2021-10-06

## 2021-11-05 ENCOUNTER — TRANSCRIPTION ENCOUNTER (OUTPATIENT)
Age: 36
End: 2021-11-05

## 2021-11-24 ENCOUNTER — TRANSCRIPTION ENCOUNTER (OUTPATIENT)
Age: 36
End: 2021-11-24

## 2021-12-08 ENCOUNTER — TRANSCRIPTION ENCOUNTER (OUTPATIENT)
Age: 36
End: 2021-12-08

## 2022-06-21 ENCOUNTER — APPOINTMENT (OUTPATIENT)
Dept: OBGYN | Facility: CLINIC | Age: 37
End: 2022-06-21
Payer: COMMERCIAL

## 2022-06-21 VITALS
DIASTOLIC BLOOD PRESSURE: 88 MMHG | SYSTOLIC BLOOD PRESSURE: 140 MMHG | WEIGHT: 232 LBS | BODY MASS INDEX: 36.34 KG/M2 | TEMPERATURE: 98 F

## 2022-06-21 PROCEDURE — 99395 PREV VISIT EST AGE 18-39: CPT

## 2022-06-22 LAB — HPV HIGH+LOW RISK DNA PNL CVX: NOT DETECTED

## 2022-06-24 LAB — CYTOLOGY CVX/VAG DOC THIN PREP: NORMAL

## 2022-07-11 ENCOUNTER — APPOINTMENT (OUTPATIENT)
Dept: OBGYN | Facility: CLINIC | Age: 37
End: 2022-07-11

## 2022-07-11 PROCEDURE — 99395 PREV VISIT EST AGE 18-39: CPT

## 2022-07-11 NOTE — PROCEDURE
[IUD Removal] : intrauterine device (IUD) removal [Time out performed] : Pre-procedure time out performed.  Patient's name, date of birth and procedure confirmed. [Consent Obtained] : Consent obtained [Fertility Desired] : fertility desired [Risks] : risks [Benefits] : benefits [Alternatives] : alternatives [Patient] : patient [Speculum Placed] : speculum placed [IUD Removed - Forceps] : IUD removed - forceps [IUD Discarded] : IUD discarded [Sent to Pathology] : specimen was placed in buffered formalin and sent for pathology [Tolerated Well] : Patient tolerated the procedure well [No Complications] : no complications [Heavy Vaginal Bleeding] : for heavy vaginal bleeding [Pelvic Pain] : for pelvic pain

## 2022-10-27 ENCOUNTER — APPOINTMENT (OUTPATIENT)
Dept: OBGYN | Facility: CLINIC | Age: 37
End: 2022-10-27

## 2022-10-27 PROCEDURE — 99214 OFFICE O/P EST MOD 30 MIN: CPT | Mod: 25

## 2022-10-27 PROCEDURE — 76830 TRANSVAGINAL US NON-OB: CPT

## 2022-10-27 PROCEDURE — 81025 URINE PREGNANCY TEST: CPT

## 2022-11-17 ENCOUNTER — NON-APPOINTMENT (OUTPATIENT)
Age: 37
End: 2022-11-17

## 2022-11-22 ENCOUNTER — APPOINTMENT (OUTPATIENT)
Dept: OBGYN | Facility: CLINIC | Age: 37
End: 2022-11-22

## 2022-11-22 VITALS
BODY MASS INDEX: 38.45 KG/M2 | SYSTOLIC BLOOD PRESSURE: 128 MMHG | WEIGHT: 245 LBS | DIASTOLIC BLOOD PRESSURE: 78 MMHG | HEIGHT: 67 IN | TEMPERATURE: 97.9 F

## 2022-11-22 LAB
ALBUMIN SERPL ELPH-MCNC: 4.3 G/DL
ALP BLD-CCNC: 69 U/L
ALT SERPL-CCNC: 13 U/L
ANION GAP SERPL CALC-SCNC: 13 MMOL/L
APPEARANCE: CLEAR
APTT BLD: 29.2 SEC
AST SERPL-CCNC: 14 U/L
BACTERIA: NEGATIVE
BASOPHILS # BLD AUTO: 0.02 K/UL
BASOPHILS NFR BLD AUTO: 0.3 %
BILIRUB SERPL-MCNC: 0.5 MG/DL
BILIRUBIN URINE: NEGATIVE
BLOOD URINE: NEGATIVE
BUN SERPL-MCNC: 13 MG/DL
CALCIUM SERPL-MCNC: 9.4 MG/DL
CHLORIDE SERPL-SCNC: 99 MMOL/L
CO2 SERPL-SCNC: 22 MMOL/L
COLOR: COLORLESS
CREAT SERPL-MCNC: 0.67 MG/DL
EGFR: 115 ML/MIN/1.73M2
EOSINOPHIL # BLD AUTO: 0.1 K/UL
EOSINOPHIL NFR BLD AUTO: 1.7 %
GLUCOSE QUALITATIVE U: NEGATIVE
GLUCOSE SERPL-MCNC: 73 MG/DL
HCT VFR BLD CALC: 34.5 %
HGB BLD-MCNC: 11.2 G/DL
HYALINE CASTS: 1 /LPF
IMM GRANULOCYTES NFR BLD AUTO: 0.2 %
INR PPP: 0.97 RATIO
KETONES URINE: NEGATIVE
LDH SERPL-CCNC: 184 U/L
LEUKOCYTE ESTERASE URINE: NEGATIVE
LYMPHOCYTES # BLD AUTO: 1.26 K/UL
LYMPHOCYTES NFR BLD AUTO: 21.7 %
MAN DIFF?: NORMAL
MCHC RBC-ENTMCNC: 26.8 PG
MCHC RBC-ENTMCNC: 32.5 GM/DL
MCV RBC AUTO: 82.5 FL
MICROSCOPIC-UA: NORMAL
MONOCYTES # BLD AUTO: 0.39 K/UL
MONOCYTES NFR BLD AUTO: 6.7 %
NEUTROPHILS # BLD AUTO: 4.02 K/UL
NEUTROPHILS NFR BLD AUTO: 69.4 %
NITRITE URINE: NEGATIVE
PH URINE: 6
PLATELET # BLD AUTO: 257 K/UL
POTASSIUM SERPL-SCNC: 4 MMOL/L
PROT SERPL-MCNC: 7.4 G/DL
PROTEIN URINE: NEGATIVE
PT BLD: 11.3 SEC
RBC # BLD: 4.18 M/UL
RBC # FLD: 13.7 %
RED BLOOD CELLS URINE: 1 /HPF
SODIUM SERPL-SCNC: 134 MMOL/L
SPECIFIC GRAVITY URINE: 1.01
SQUAMOUS EPITHELIAL CELLS: 2 /HPF
T4 FREE SERPL-MCNC: 1.1 NG/DL
TSH SERPL-ACNC: 1.66 UIU/ML
URATE SERPL-MCNC: 3.8 MG/DL
UROBILINOGEN URINE: NORMAL
WBC # FLD AUTO: 5.8 K/UL
WHITE BLOOD CELLS URINE: 1 /HPF

## 2022-11-22 PROCEDURE — 0501F PRENATAL FLOW SHEET: CPT

## 2022-11-23 ENCOUNTER — NON-APPOINTMENT (OUTPATIENT)
Age: 37
End: 2022-11-23

## 2022-11-23 LAB
BLD GP AB SCN SERPL QL: NORMAL
CREAT SPEC-SCNC: 39 MG/DL
CREAT/PROT UR: 0.4 RATIO
ESTIMATED AVERAGE GLUCOSE: 108 MG/DL
HBA1C MFR BLD HPLC: 5.4 %
HBV SURFACE AG SER QL: NONREACTIVE
HCV AB SER QL: NONREACTIVE
HCV S/CO RATIO: 0.21 S/CO
HGB A MFR BLD: 97.6 %
HGB A2 MFR BLD: 2.4 %
HGB FRACT BLD-IMP: NORMAL
HIV1+2 AB SPEC QL IA.RAPID: NONREACTIVE
HSV 1+2 IGG SER IA-IMP: NEGATIVE
HSV 1+2 IGG SER IA-IMP: NEGATIVE
HSV1 IGG SER QL: 0.3 INDEX
HSV2 IGG SER QL: 0.14 INDEX
PROT UR-MCNC: 14 MG/DL
RUBV IGG FLD-ACNC: 14.8 INDEX
RUBV IGG SER-IMP: POSITIVE
T GONDII AB SER-IMP: NEGATIVE
T GONDII AB SER-IMP: NEGATIVE
T GONDII IGG SER QL: <3 IU/ML
T GONDII IGM SER QL: <3 AU/ML
T PALLIDUM AB SER QL IA: NEGATIVE

## 2022-11-26 LAB
BACTERIA UR CULT: NORMAL
C TRACH RRNA SPEC QL NAA+PROBE: NOT DETECTED
FMR1 GENE MUT ANL BLD/T: NORMAL
N GONORRHOEA RRNA SPEC QL NAA+PROBE: NOT DETECTED
SOURCE AMPLIFICATION: NORMAL

## 2022-11-28 ENCOUNTER — APPOINTMENT (OUTPATIENT)
Dept: ANTEPARTUM | Facility: CLINIC | Age: 37
End: 2022-11-28

## 2022-11-28 ENCOUNTER — ASOB RESULT (OUTPATIENT)
Age: 37
End: 2022-11-28

## 2022-11-28 ENCOUNTER — NON-APPOINTMENT (OUTPATIENT)
Age: 37
End: 2022-11-28

## 2022-11-28 PROCEDURE — 36415 COLL VENOUS BLD VENIPUNCTURE: CPT

## 2022-11-28 PROCEDURE — 76813 OB US NUCHAL MEAS 1 GEST: CPT

## 2022-11-29 LAB
B19V IGG SER QL IA: 5.87 INDEX
B19V IGG+IGM SER-IMP: NORMAL
B19V IGG+IGM SER-IMP: POSITIVE
B19V IGM FLD-ACNC: 0.1 INDEX
B19V IGM SER-ACNC: NEGATIVE
CLARI TEST COMMENT: NORMAL
CLARIM 22Q11.2: NORMAL
CLARIM ADDITIONAL INFO: NORMAL
CLARIM CHROMOSOME 13: NORMAL
CLARIM CHROMOSOME 18: NORMAL
CLARIM CHROMOSOME 21: NORMAL
CLARIM EDD: NORMAL
CLARIM SEX CHROMOSOMES: NORMAL
CLARITEST NIPT W/MICRO: NORMAL
CREAT 24H UR-MCNC: 1.7 G/24 H
CREAT 24H UR-MCNC: 1.7 G/24 H
CREAT ?TM UR-MCNC: 62 MG/DL
CREAT ?TM UR-MCNC: 62 MG/DL
FETAL FRACT: NORMAL
GESTATION AGE: NORMAL
MATERNAL WEIGHT (LBS):: 232
PLEASE INCLUDE GENDER RESULTS ON THIS REPORT:: NORMAL
PROT 24H UR-MRATE: 4 MG/DL
PROT ?TM UR-MCNC: 24 HR
PROT ?TM UR-MCNC: 24 HR
PROT UR-MCNC: 110 MG/24 H
SPECIMEN VOL 24H UR: 2750 ML
SPECIMEN VOL 24H UR: 2750 ML
TYPE OF PREGNANCY:: NORMAL

## 2022-11-30 ENCOUNTER — NON-APPOINTMENT (OUTPATIENT)
Age: 37
End: 2022-11-30

## 2022-12-01 LAB
ADDITIONAL US: NORMAL
CRL SCAN TWIN B: NORMAL
CRL SCAN: NORMAL
CROWN RUMP LENGTH TWIN B: NORMAL
CROWN RUMP LENGTH: 55.8 MM
DIA MOM: 1.38
DIA VALUE: 249.9 PG/ML
DOWN SYNDROME AGE RISK: NORMAL
DOWN SYNDROME INTERPRETATION: NORMAL
DOWN SYNDROME SCREENING RISK: NORMAL
FIRST TRIMESTER SCREEN COMMENTS: NORMAL
FIRST TRIMESTER SCREEN NOTE: NORMAL
FIRST TRIMESTER SCREEN RESULTS: NORMAL
FIRST TRIMESTER SCREEN TEST RESULTS: ABNORMAL
GEST. AGE ON COLLECTION DATE: 12 WEEKS
HCG MOM: 1.51
HCG VALUE: 117.2 IU/ML
MATERNAL AGE AT EDD: 38.6 YR
NT MOM TWIN B: NORMAL
NT TWIN B: NORMAL
NUCHAL TRANSLUCENCY (NT): 1.6 MM
NUCHAL TRANSLUCENCY MOM: 1.23
NUMBER OF FETUSES: 1
PAPP-A MOM: 0.67
PAPP-A VALUE: 286.4 NG/ML
RACE: NORMAL
SONOGRAPHER ID#: NORMAL
TRISOMY 18 AGE RISK: NORMAL
TRISOMY 18 INTERPRETATION: NORMAL
TRISOMY 18 SCREENING RISK: NORMAL
WEIGHT AFP: 247 LBS

## 2022-12-03 ENCOUNTER — EMERGENCY (EMERGENCY)
Facility: HOSPITAL | Age: 37
LOS: 0 days | Discharge: HOME | End: 2022-12-03
Attending: STUDENT IN AN ORGANIZED HEALTH CARE EDUCATION/TRAINING PROGRAM
Payer: COMMERCIAL

## 2022-12-03 VITALS — WEIGHT: 244.93 LBS

## 2022-12-03 VITALS
SYSTOLIC BLOOD PRESSURE: 129 MMHG | DIASTOLIC BLOOD PRESSURE: 63 MMHG | OXYGEN SATURATION: 100 % | HEART RATE: 84 BPM | RESPIRATION RATE: 16 BRPM

## 2022-12-03 DIAGNOSIS — Z86.018 PERSONAL HISTORY OF OTHER BENIGN NEOPLASM: ICD-10-CM

## 2022-12-03 DIAGNOSIS — O99.111 OTHER DISEASES OF THE BLOOD AND BLOOD-FORMING ORGANS AND CERTAIN DISORDERS INVOLVING THE IMMUNE MECHANISM COMPLICATING PREGNANCY, FIRST TRIMESTER: ICD-10-CM

## 2022-12-03 DIAGNOSIS — Z98.891 HISTORY OF UTERINE SCAR FROM PREVIOUS SURGERY: Chronic | ICD-10-CM

## 2022-12-03 DIAGNOSIS — Z88.8 ALLERGY STATUS TO OTHER DRUGS, MEDICAMENTS AND BIOLOGICAL SUBSTANCES STATUS: ICD-10-CM

## 2022-12-03 DIAGNOSIS — R10.11 RIGHT UPPER QUADRANT PAIN: ICD-10-CM

## 2022-12-03 DIAGNOSIS — O26.891 OTHER SPECIFIED PREGNANCY RELATED CONDITIONS, FIRST TRIMESTER: ICD-10-CM

## 2022-12-03 DIAGNOSIS — Z87.59 PERSONAL HISTORY OF OTHER COMPLICATIONS OF PREGNANCY, CHILDBIRTH AND THE PUERPERIUM: ICD-10-CM

## 2022-12-03 DIAGNOSIS — O99.891 OTHER SPECIFIED DISEASES AND CONDITIONS COMPLICATING PREGNANCY: ICD-10-CM

## 2022-12-03 DIAGNOSIS — Z3A.13 13 WEEKS GESTATION OF PREGNANCY: ICD-10-CM

## 2022-12-03 DIAGNOSIS — O23.41 UNSPECIFIED INFECTION OF URINARY TRACT IN PREGNANCY, FIRST TRIMESTER: ICD-10-CM

## 2022-12-03 DIAGNOSIS — D17.1 BENIGN LIPOMATOUS NEOPLASM OF SKIN AND SUBCUTANEOUS TISSUE OF TRUNK: Chronic | ICD-10-CM

## 2022-12-03 DIAGNOSIS — M54.9 DORSALGIA, UNSPECIFIED: ICD-10-CM

## 2022-12-03 DIAGNOSIS — O16.1 UNSPECIFIED MATERNAL HYPERTENSION, FIRST TRIMESTER: ICD-10-CM

## 2022-12-03 DIAGNOSIS — R82.71 BACTERIURIA: ICD-10-CM

## 2022-12-03 DIAGNOSIS — D72.829 ELEVATED WHITE BLOOD CELL COUNT, UNSPECIFIED: ICD-10-CM

## 2022-12-03 LAB
ALBUMIN SERPL ELPH-MCNC: 3.1 G/DL — LOW (ref 3.3–5)
ALP SERPL-CCNC: 64 U/L — SIGNIFICANT CHANGE UP (ref 40–120)
ALT FLD-CCNC: 26 U/L — SIGNIFICANT CHANGE UP (ref 12–78)
ANION GAP SERPL CALC-SCNC: 4 MMOL/L — LOW (ref 5–17)
APPEARANCE UR: ABNORMAL
AST SERPL-CCNC: 14 U/L — LOW (ref 15–37)
BASOPHILS # BLD AUTO: 0.02 K/UL — SIGNIFICANT CHANGE UP (ref 0–0.2)
BASOPHILS NFR BLD AUTO: 0.4 % — SIGNIFICANT CHANGE UP (ref 0–2)
BILIRUB SERPL-MCNC: 0.9 MG/DL — SIGNIFICANT CHANGE UP (ref 0.2–1.2)
BILIRUB UR-MCNC: NEGATIVE — SIGNIFICANT CHANGE UP
BUN SERPL-MCNC: 12 MG/DL — SIGNIFICANT CHANGE UP (ref 7–23)
CALCIUM SERPL-MCNC: 8.5 MG/DL — SIGNIFICANT CHANGE UP (ref 8.5–10.1)
CHLORIDE SERPL-SCNC: 104 MMOL/L — SIGNIFICANT CHANGE UP (ref 96–108)
CO2 SERPL-SCNC: 27 MMOL/L — SIGNIFICANT CHANGE UP (ref 22–31)
COLOR SPEC: ABNORMAL
CREAT SERPL-MCNC: 0.55 MG/DL — SIGNIFICANT CHANGE UP (ref 0.5–1.3)
DIFF PNL FLD: NEGATIVE — SIGNIFICANT CHANGE UP
EGFR: 120 ML/MIN/1.73M2 — SIGNIFICANT CHANGE UP
EOSINOPHIL # BLD AUTO: 0.12 K/UL — SIGNIFICANT CHANGE UP (ref 0–0.5)
EOSINOPHIL NFR BLD AUTO: 2.2 % — SIGNIFICANT CHANGE UP (ref 0–6)
GLUCOSE SERPL-MCNC: 88 MG/DL — SIGNIFICANT CHANGE UP (ref 70–99)
GLUCOSE UR QL: NEGATIVE — SIGNIFICANT CHANGE UP
HCT VFR BLD CALC: 32.9 % — LOW (ref 34.5–45)
HGB BLD-MCNC: 10.8 G/DL — LOW (ref 11.5–15.5)
IMM GRANULOCYTES NFR BLD AUTO: 0 % — SIGNIFICANT CHANGE UP (ref 0–0.9)
KETONES UR-MCNC: NEGATIVE — SIGNIFICANT CHANGE UP
LEUKOCYTE ESTERASE UR-ACNC: ABNORMAL
LYMPHOCYTES # BLD AUTO: 0.71 K/UL — LOW (ref 1–3.3)
LYMPHOCYTES # BLD AUTO: 12.8 % — LOW (ref 13–44)
MCHC RBC-ENTMCNC: 27 PG — SIGNIFICANT CHANGE UP (ref 27–34)
MCHC RBC-ENTMCNC: 32.8 GM/DL — SIGNIFICANT CHANGE UP (ref 32–36)
MCV RBC AUTO: 82.3 FL — SIGNIFICANT CHANGE UP (ref 80–100)
MONOCYTES # BLD AUTO: 0.44 K/UL — SIGNIFICANT CHANGE UP (ref 0–0.9)
MONOCYTES NFR BLD AUTO: 7.9 % — SIGNIFICANT CHANGE UP (ref 2–14)
NEUTROPHILS # BLD AUTO: 4.26 K/UL — SIGNIFICANT CHANGE UP (ref 1.8–7.4)
NEUTROPHILS NFR BLD AUTO: 76.7 % — SIGNIFICANT CHANGE UP (ref 43–77)
NITRITE UR-MCNC: NEGATIVE — SIGNIFICANT CHANGE UP
PH UR: 6 — SIGNIFICANT CHANGE UP (ref 5–8)
PLATELET # BLD AUTO: 204 K/UL — SIGNIFICANT CHANGE UP (ref 150–400)
POTASSIUM SERPL-MCNC: 3.7 MMOL/L — SIGNIFICANT CHANGE UP (ref 3.5–5.3)
POTASSIUM SERPL-SCNC: 3.7 MMOL/L — SIGNIFICANT CHANGE UP (ref 3.5–5.3)
PROT SERPL-MCNC: 7.1 GM/DL — SIGNIFICANT CHANGE UP (ref 6–8.3)
PROT UR-MCNC: 15
RBC # BLD: 4 M/UL — SIGNIFICANT CHANGE UP (ref 3.8–5.2)
RBC # FLD: 13.8 % — SIGNIFICANT CHANGE UP (ref 10.3–14.5)
SODIUM SERPL-SCNC: 135 MMOL/L — SIGNIFICANT CHANGE UP (ref 135–145)
SP GR SPEC: 1.01 — SIGNIFICANT CHANGE UP (ref 1.01–1.02)
UROBILINOGEN FLD QL: NEGATIVE — SIGNIFICANT CHANGE UP
WBC # BLD: 5.55 K/UL — SIGNIFICANT CHANGE UP (ref 3.8–10.5)
WBC # FLD AUTO: 5.55 K/UL — SIGNIFICANT CHANGE UP (ref 3.8–10.5)

## 2022-12-03 PROCEDURE — 76770 US EXAM ABDO BACK WALL COMP: CPT

## 2022-12-03 PROCEDURE — 85025 COMPLETE CBC W/AUTO DIFF WBC: CPT

## 2022-12-03 PROCEDURE — 36415 COLL VENOUS BLD VENIPUNCTURE: CPT

## 2022-12-03 PROCEDURE — 80053 COMPREHEN METABOLIC PANEL: CPT

## 2022-12-03 PROCEDURE — 76770 US EXAM ABDO BACK WALL COMP: CPT | Mod: 26

## 2022-12-03 PROCEDURE — 81001 URINALYSIS AUTO W/SCOPE: CPT

## 2022-12-03 PROCEDURE — 99284 EMERGENCY DEPT VISIT MOD MDM: CPT | Mod: 25

## 2022-12-03 PROCEDURE — 87086 URINE CULTURE/COLONY COUNT: CPT

## 2022-12-03 PROCEDURE — 99284 EMERGENCY DEPT VISIT MOD MDM: CPT

## 2022-12-03 RX ORDER — ACETAMINOPHEN 500 MG
1000 TABLET ORAL ONCE
Refills: 0 | Status: COMPLETED | OUTPATIENT
Start: 2022-12-03 | End: 2022-12-03

## 2022-12-03 RX ORDER — CEPHALEXIN 500 MG
1 CAPSULE ORAL
Qty: 10 | Refills: 0
Start: 2022-12-03 | End: 2022-12-07

## 2022-12-03 RX ADMIN — Medication 1000 MILLIGRAM(S): at 12:58

## 2022-12-03 NOTE — ED ADULT NURSE NOTE - OBJECTIVE STATEMENT
Pt comes to the ED complaining of low back pain that is constant but has intensified over the past two days. Pt states that she is 13 weeks pregnant. Last pregnancy was 4.5 years ago. Pt denies urinary symptoms.

## 2022-12-03 NOTE — ED PROVIDER NOTE - CLINICAL SUMMARY MEDICAL DECISION MAKING FREE TEXT BOX
37 yr old female 13 weeks pregnant presents with right sides flank pain x3 days. Exam most consistent with msk. Will eval for stone with ultrasound labs urine pain control

## 2022-12-03 NOTE — ED ADULT TRIAGE NOTE - CHIEF COMPLAINT QUOTE
pt presents to ED due to complaints of severe lower back pain no radiation x 3 days pt states it got to its worst last night 9pm pt is currently 13 weeks pregnant no relief with tylenol

## 2022-12-03 NOTE — ED ADULT NURSE REASSESSMENT NOTE - NS ED NURSE REASSESS COMMENT FT1
Received patient from Kaveh Lemus RN. Pt returned from US and lab work to be done. Pt resting comfortably in bed.  at bedside. Comfort and safety maintained.

## 2022-12-03 NOTE — ED PROVIDER NOTE - NSFOLLOWUPINSTRUCTIONS_ED_ALL_ED_FT
Back Pain    Back pain is very common in adults. The cause of back pain is rarely dangerous and the pain often gets better over time. The cause of your back pain may not be known and may include strain of muscles or ligaments, degeneration of the spinal disks, or arthritis. Occasionally the pain may radiate down your leg(s). Over-the-counter medicines to reduce pain and inflammation are often the most helpful. Stretching and remaining active frequently helps the healing process.     Take Tylenol 650-1000mg every 4-6 hours as needed for pain.     Follow up with your OB for the pregnancy.    Please  your antibiotics from the pharmacy and take them as prescribed. Continue taking until finished, even if you feel completely better. Inform your primary doctor if you experience rash, shortness of breath, abdominal pain, or diarrhea.     SEEK IMMEDIATE MEDICAL CARE IF YOU HAVE ANY OF THE FOLLOWING SYMPTOMS: bowel or bladder control problems, unusual weakness or numbness in your arms or legs, nausea or vomiting, abdominal pain, fever, dizziness/lightheadedness.

## 2022-12-03 NOTE — ED PROVIDER NOTE - PATIENT PORTAL LINK FT
You can access the FollowMyHealth Patient Portal offered by [facility] by registering at the following website: [email or web address]. By joining [facility]’s FollowMyHealth portal, you will also be able to view your health information using other applications (apps) compatible with our system.

## 2022-12-03 NOTE — ED PROVIDER NOTE - OBJECTIVE STATEMENT
37 yr old female with PMHx of HTN on labetalol, C section, lipoma removal surgery presents to ED c/o of back pain. Pt is 13 weeks pregnant. Pt states that back pain has progressively got worse over the past 3 days. As a result pt is having difficulty walking, standing, and difficulty getting out of bed. Pt took tylenol last night with relief in order to sleep. 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  Pt states that pain was dull at first. No chills, fever, dysuria, hematuria. diarrhea, nausea, incontinence. Pt had ultrasound done on Monday with normal findings. Denies smoking. No known allergies to medicine  OB: Jian Hines

## 2022-12-03 NOTE — ED PROVIDER NOTE - CHPI ED SYMPTOMS NEG
no bladder dysfunction/no bowel dysfunction/no constipation/no numbness/no tingling/no motor function loss

## 2022-12-03 NOTE — ED ADULT TRIAGE NOTE - INTERNATIONAL TRAVEL
LMOM re appt  eliecer Salinas   On  Wed 8/18 9:30    Location has moved from [place] to [facility]  IS this okay? Pt lives in [place] -so prob okay  If not please c/b to r/s back to [facility]      Left Team 4  number
Spoke w pt    OK w loca change    Sent appt card
No

## 2022-12-03 NOTE — ED PROVIDER NOTE - CARE PLAN
Principal Discharge DX:	Right flank pain   1 Principal Discharge DX:	Right flank pain  Secondary Diagnosis:	UTI (urinary tract infection)

## 2022-12-03 NOTE — ED PROVIDER NOTE - NS_ ATTENDINGSCRIBEDETAILS _ED_A_ED_FT
I, Chris Davis DO,  performed the initial face to face bedside interview with this patient regarding history of present illness, review of symptoms and relevant past medical, social and family history.  I completed an independent physical examination.  I was the initial provider who evaluated this patient.   I personally saw the patient and performed a substantive portion of the visit including all aspects of the medical decision making.  The history, relevant review of systems, past medical and surgical history, medical decision making, and physical examination was documented by the scribe in my presence and I attest to the accuracy of the documentation.

## 2022-12-03 NOTE — ED PROVIDER NOTE - NSICDXFAMILYHX_GEN_ALL_CORE_FT
FAMILY HISTORY:  Father  Still living? No  Family history of acute myocardial infarction, Age at diagnosis: 51-60  Family history of diabetes mellitus, Age at diagnosis: Age Unknown

## 2022-12-03 NOTE — ED PROVIDER NOTE - IV ALTEPLASE INCLUSION HIDDEN
· On rituxan at home.  · Continuing with home prednisone, home plaquenil.  · Concern for pulmonary hemorrhange but hgb grossly stable and no blood suctioned from ET tube.    show

## 2022-12-03 NOTE — ED PROVIDER NOTE - PROGRESS NOTE DETAILS
Labs nonactionable, ultrasound without hydro, UA with 3–5 white blood cells, trace leuks, few bacteria.  Given pregnancy will treat for asymptomatic bacteria and await urine culture.  Patient prefers antibiotics to be sent to pharmacy. Discussed return precautions and all questions answered. Pt in agreement w/ plan. CAOx3, NAD, VSS. Stable for d/c. Labs non-actionable, ultrasound without hydro, UA with 3–5 white blood cells, trace leuks, few bacteria.  Given pregnancy will treat for asymptomatic bacteria and await urine culture.  Patient prefers antibiotics to be sent to pharmacy. Discussed return precautions and all questions answered. Pt in agreement w/ plan. CAOx3, NAD, VSS. Stable for d/c.

## 2022-12-04 ENCOUNTER — NON-APPOINTMENT (OUTPATIENT)
Age: 37
End: 2022-12-04

## 2022-12-04 LAB
CULTURE RESULTS: SIGNIFICANT CHANGE UP
SPECIMEN SOURCE: SIGNIFICANT CHANGE UP

## 2022-12-05 ENCOUNTER — NON-APPOINTMENT (OUTPATIENT)
Age: 37
End: 2022-12-05

## 2022-12-05 ENCOUNTER — TRANSCRIPTION ENCOUNTER (OUTPATIENT)
Age: 37
End: 2022-12-05

## 2022-12-07 LAB
HSV1 IGM SER QL: NEGATIVE
HSV2 AB FLD-ACNC: NEGATIVE

## 2022-12-14 ENCOUNTER — APPOINTMENT (OUTPATIENT)
Dept: ANTEPARTUM | Facility: CLINIC | Age: 37
End: 2022-12-14

## 2022-12-14 ENCOUNTER — APPOINTMENT (OUTPATIENT)
Dept: MATERNAL FETAL MEDICINE | Facility: CLINIC | Age: 37
End: 2022-12-14

## 2022-12-14 VITALS
HEIGHT: 67 IN | BODY MASS INDEX: 38.92 KG/M2 | RESPIRATION RATE: 18 BRPM | OXYGEN SATURATION: 99 % | DIASTOLIC BLOOD PRESSURE: 80 MMHG | HEART RATE: 80 BPM | SYSTOLIC BLOOD PRESSURE: 108 MMHG | WEIGHT: 248 LBS

## 2022-12-14 DIAGNOSIS — Z98.891 HISTORY OF UTERINE SCAR FROM PREVIOUS SURGERY: ICD-10-CM

## 2022-12-14 PROCEDURE — 99215 OFFICE O/P EST HI 40 MIN: CPT

## 2022-12-16 ENCOUNTER — NON-APPOINTMENT (OUTPATIENT)
Age: 37
End: 2022-12-16

## 2022-12-16 NOTE — FAMILY HISTORY
[Reported Family History Of Birth Defects] : no congenital heart defects [Yobany-Sachs Carrier] : no Yobany-Sachs [Family History] : no mental retardation/autism [Reported Family History Of Genetic Disease] : no history of child defect in child of baby father

## 2022-12-16 NOTE — OB HISTORY
[Pregnancy History] : patient received anesthesia [___] : pregnancy complications occured [Definite:  ___ (Date)] : the last menstrual period was [unfilled] [Normal Amount/Duration] : was of a normal amount and duration [Spotting Between  Menses] : no spotting between menses [Regular Cycle Intervals] : periods have been regular [Menstrual Cramps] : no menstrual cramps [On BCP at conception] : the patient was not on BCP at conception [LMP: ___] : LMP: [unfilled] [ZUHAIR: ___] : ZUHAIR: [unfilled] [EGA: ___ wks] : EGA: [unfilled] wks [Spontaneous] : Spontaneous conception [Sonogram] : sonogram [at ___ wks] : at [unfilled] weeks [FreeTextEntry1] : [facility] to discuss CHTN and history of post partum cardiomyopathy,  section and obesity. Pt was dx with CHTN in  and currently taking Labetalol 400mg BID, recently decreased from 3x daily by cardio and baby ASA alternating 1-2 tabs. UPC and 24 hour urine attached. Denies headache, blurry vision and right upper gastric pain. Pt takes BP at home daily- states they range from 108-120 systolic and 60-80 diastolic. Pt had  delivery in 2018 and had post partum cardiomyopathy 10 weeks after delivery. Pt is followed by a heart failure specialist yearly.  Pt last saw cardio on 12/3 and has f/u scheduled for echo on 22.Patient takes her blood pressures at home once daily, BP log attached. FHR confirmed via sono at 161bpm. Level 2 sono scheduled for 23. Patient denies vaginal bleeding, leakage of fluid and cramping/ contractions.

## 2022-12-16 NOTE — VITALS
[LMP (date): ___] : LMP was on [unfilled] [GA =___ Weeks] : which calculates to a GA of [unfilled] weeks [GA= ___ Days] : and [unfilled] day(s) [ZUHAIR by LMP (date): ___] : The calculated ZUHAIR by LMP is [unfilled] [By LMP] : this is the final ZUHAIR

## 2022-12-16 NOTE — CHIEF COMPLAINT
[G ___] : G [unfilled] [P ___] : P [unfilled] [de-identified] : chronic hypertension and history of postpartum cardiomyopathy

## 2022-12-16 NOTE — DISCUSSION/SUMMARY
[FreeTextEntry1] : We had the pleasure of seeing your patient for a Maternal-Fetal Medicine consultation today. She is a 37 year-old  at 14 3/7 weeks of gestation with a pregnancy complicated by the below issues.\par \par NIPS low risk\par \par She was extensively counseled regarding the following issues:\par \par History of peripartum cardiomyopathy (PPCM): Women with history of PPCM are at risk of recurrence with subsequent pregnancies. The risk of complications is high, particularly among women who do not have full recovery of LV function. Patients with persistent LV dysfunction (LVEF <50 percent) or LVEF =25 percent at diagnosis should be advised to avoid a subsequent pregnancy due to the risk of HF progression and death. Other patients with PPCM should also be advised of the risk of recurrence. Among women in whom LV function returns to normal (as in this case for Lang), the risk of LV dysfunction during subsequent pregnancy appears lower than for those with persistent LV dysfunction, but elevated compared with the general population. Recommend close monitoring for signs of ventricular dysfunction. Close follow-up with Cardiology.\par \par Chronic hypertension: Women with pre-existing hypertension are at an increased risk for exacerbation of hypertension and super-imposed preeclampsia. It is recommended that women with chronic hypertension have baseline laboratory workup that includes: 24 hour urine protein collection or protein/creatinine ratio to establish a baseline of proteinuria, comprehensive metabolic profile (CMP) to evaluate renal and liver function, complete blood count (CBC) to check platelets. Recent laboratory results were reviewed and within normal limits. The fetus is at an increased risk for growth restriction. Therefore, ultrasounds for growth are recommended every 4 weeks after 20 weeks. Antepartum fetal surveillance is recommended starting at 32 weeks. Home blood pressure monitoring is recommended. We discussed that the goal of antihypertensive treatment in pregnancy is to keep blood pressures less than 140/90. Currently her blood pressures are consistently below this with Labetalol 400 mg twice daily. Recommend low dose aspirin for preeclampsia risk reduction.\par \par Advanced Maternal Age (AMA): Women who are of advanced maternal age (typically defined as age 35 at delivery) are at an increased risk for fetal aneuploidy, spontaneous , congenital malformations, diabetes, hypertensive disorders of pregnancy,  delivery, stillbirth, and low birth weight neonates. Genetic counseling is available, if desired, to review and discuss invasive and non-invasive options to detect aneuploidy. Non-invasive prenatal screening (NIPS) was low risk.\par \par Obesity, class 2: Obesity is associated with an increased risk for pregnancy complications such as preeclampsia. Complications from surgery are increased, as well as failure of regional anesthesia. In addition, the fetus is at increased risk for congenital anomalies, most commonly neural tube defects and cardiac anomalies, even in the absence of diabetes.  Malformations are more difficult to assess by ultrasound evaluation due to the decreased sensitivity of ultrasound in women with a high BMI. During pregnancy, optimum weight gain recommended by the [place] of [employer] 2009 Guidelines is 11-20 pounds. Furthermore, pregnant women with obesity are at a greater risk for venous thromboembolism.\par \par Fetal echo 22\par Anatomy ultrasound in 6 weeks\par Follow-up MFM consult in 6 weeks\par \par \par Thank you for requesting a consultation on this patient. The total time spent in preparation for this visit, medical history taking, orders, review of records, counseling the patient, and writing this note was 60 minutes.\par \par At the end of our discussion, the patient indicated that her questions were answered and she seemed satisfied with our discussion. Please do not hesitate to contact us with any questions.\par \par Sincerely,\par \par \par Orlin Stevens MD, KATHRIN\par Attending Physician, Maternal-Fetal Medicine\par

## 2022-12-20 ENCOUNTER — APPOINTMENT (OUTPATIENT)
Dept: OBGYN | Facility: CLINIC | Age: 37
End: 2022-12-20

## 2022-12-20 PROCEDURE — 0502F SUBSEQUENT PRENATAL CARE: CPT

## 2022-12-23 ENCOUNTER — NON-APPOINTMENT (OUTPATIENT)
Age: 37
End: 2022-12-23

## 2022-12-23 LAB
ADDITIONAL US: NORMAL
AFP MOM: 0.54
AFP VALUE: 10.5 NG/ML
COLLECTED ON 2: NORMAL
COLLECTED ON: NORMAL
CRL SCAN TWIN B: NORMAL
CRL SCAN: NORMAL
CROWN RUMP LENGTH TWIN B: NORMAL
CROWN RUMP LENGTH: 55.8 MM
DIA MOM: 1.29
DIA VALUE: 163.9 PG/ML
DOWN SYNDROME AGE RISK: NORMAL
DOWN SYNDROME INTERPRETATION: NORMAL
DOWN SYNDROME SCREENING RISK: NORMAL
FIRST TRIMESTER SAMPLE: NORMAL
GEST. AGE ON COLLECTION DATE: 12 WEEKS
GESTATIONAL AGE: 15.1 WEEKS
HCG MOM: 1.73
HCG VALUE: 58.7 IU/ML
INSULIN DEP DIABETES: NO
MATERNAL AGE AT EDD: 38.6 YR
NT MOM TWIN B: NORMAL
NT TWIN B: NORMAL
NUCHAL TRANSLUCENCY (NT): 1.6 MM
NUCHAL TRANSLUCENCY MOM: 1.23
NUMBER OF FETUSES: 1
OPEN SPINA BIFIDA: NORMAL
OSB INTERPRETATION: NORMAL
PAPP-A MOM: 0.67
PAPP-A VALUE: 286.4 NG/ML
RACE: NORMAL
SECOND TRIMESTER SAMPLE: NORMAL
SEQUENTIAL 2 COMMENTS: NORMAL
SEQUENTIAL 2 NOTE: NORMAL
SEQUENTIAL 2 RESULTS: NORMAL
SEQUENTIAL 2 TEST RESULTS: ABNORMAL
SONOGRAPHER ID#: NORMAL
TRISOMY 18 AGE RISK: NORMAL
TRISOMY 18 INTERPRETATION: NORMAL
TRISOMY 18 SCREENING RISK: NORMAL
UE3 MOM: 0.65
UE3 VALUE: 0.44 NG/ML
WEIGHT AFP: 247 LBS
WEIGHT: 248 LBS

## 2023-01-03 ENCOUNTER — APPOINTMENT (OUTPATIENT)
Dept: ANTEPARTUM | Facility: CLINIC | Age: 38
End: 2023-01-03

## 2023-01-03 ENCOUNTER — APPOINTMENT (OUTPATIENT)
Dept: MATERNAL FETAL MEDICINE | Facility: CLINIC | Age: 38
End: 2023-01-03

## 2023-01-04 ENCOUNTER — APPOINTMENT (OUTPATIENT)
Dept: MATERNAL FETAL MEDICINE | Facility: CLINIC | Age: 38
End: 2023-01-04
Payer: COMMERCIAL

## 2023-01-04 ENCOUNTER — ASOB RESULT (OUTPATIENT)
Age: 38
End: 2023-01-04

## 2023-01-04 PROCEDURE — 99202 OFFICE O/P NEW SF 15 MIN: CPT | Mod: 95

## 2023-01-04 PROCEDURE — 99212 OFFICE O/P EST SF 10 MIN: CPT | Mod: 95

## 2023-01-13 ENCOUNTER — NON-APPOINTMENT (OUTPATIENT)
Age: 38
End: 2023-01-13

## 2023-01-17 ENCOUNTER — APPOINTMENT (OUTPATIENT)
Dept: OBGYN | Facility: CLINIC | Age: 38
End: 2023-01-17
Payer: COMMERCIAL

## 2023-01-17 VITALS
WEIGHT: 256 LBS | BODY MASS INDEX: 40.18 KG/M2 | HEIGHT: 67 IN | SYSTOLIC BLOOD PRESSURE: 110 MMHG | DIASTOLIC BLOOD PRESSURE: 70 MMHG

## 2023-01-17 PROCEDURE — 0502F SUBSEQUENT PRENATAL CARE: CPT

## 2023-01-18 LAB
APPEARANCE: CLEAR
BACTERIA: ABNORMAL
BILIRUBIN URINE: NEGATIVE
BLOOD URINE: NEGATIVE
COLOR: NORMAL
GLUCOSE QUALITATIVE U: NEGATIVE
HYALINE CASTS: 0 /LPF
KETONES URINE: NEGATIVE
LEUKOCYTE ESTERASE URINE: ABNORMAL
MICROSCOPIC-UA: NORMAL
NITRITE URINE: NEGATIVE
PH URINE: 6
PROTEIN URINE: NEGATIVE
RED BLOOD CELLS URINE: 1 /HPF
SPECIFIC GRAVITY URINE: 1.02
SQUAMOUS EPITHELIAL CELLS: 4 /HPF
UROBILINOGEN URINE: NORMAL
WHITE BLOOD CELLS URINE: 26 /HPF

## 2023-01-19 LAB — BACTERIA UR CULT: NORMAL

## 2023-01-24 ENCOUNTER — ASOB RESULT (OUTPATIENT)
Age: 38
End: 2023-01-24

## 2023-01-24 ENCOUNTER — APPOINTMENT (OUTPATIENT)
Dept: ANTEPARTUM | Facility: CLINIC | Age: 38
End: 2023-01-24
Payer: COMMERCIAL

## 2023-01-24 PROCEDURE — 76811 OB US DETAILED SNGL FETUS: CPT

## 2023-01-24 PROCEDURE — 76817 TRANSVAGINAL US OBSTETRIC: CPT | Mod: 59

## 2023-01-25 ENCOUNTER — APPOINTMENT (OUTPATIENT)
Dept: ANTEPARTUM | Facility: CLINIC | Age: 38
End: 2023-01-25

## 2023-01-25 ENCOUNTER — APPOINTMENT (OUTPATIENT)
Dept: MATERNAL FETAL MEDICINE | Facility: CLINIC | Age: 38
End: 2023-01-25
Payer: COMMERCIAL

## 2023-01-25 VITALS
RESPIRATION RATE: 18 BRPM | SYSTOLIC BLOOD PRESSURE: 122 MMHG | HEIGHT: 67 IN | WEIGHT: 256 LBS | HEART RATE: 78 BPM | OXYGEN SATURATION: 98 % | DIASTOLIC BLOOD PRESSURE: 80 MMHG | BODY MASS INDEX: 40.18 KG/M2

## 2023-01-25 PROCEDURE — 99215 OFFICE O/P EST HI 40 MIN: CPT

## 2023-01-25 NOTE — ACTIVE PROBLEMS
[Diabetes Mellitus] : no diabetes mellitus [Heart Disease] : no heart disease [Autoimmune Disease] : no autoimmune disease [Renal Disease] : no kidney disease, no UTI [Psychiatric Disorders] : no psychiatric disorders [Neurologic Disorder] : no neurologic disorder, no epilepsy [Depression] : no depression, no post partum depression [Hepatic Disorder] : no hepatitis, no liver disease [Thrombophlebitis] : no varicosities, no phlebitis [Thyroid Disorder] : no thyroid dysfunction [Trauma] : no trauma/violence [Blood Transfusion (___ Ml)] : no history of blood transfusion

## 2023-01-25 NOTE — OB HISTORY
[Pregnancy History] : patient received anesthesia [___] : pregnancy complications occured [LMP: ___] : LMP: [unfilled] [ZUHAIR: ___] : ZUHAIR: [unfilled] [EGA: ___ wks] : EGA: [unfilled] wks [Spontaneous] : Spontaneous conception [Sonogram] : sonogram [at ___ wks] : at [unfilled] weeks [FreeTextEntry1] : [facility] to discuss CHTN and history of post partum cardiomyopathy,  section and obesity. Pt was dx with CHTN in  and currently taking Labetalol 400mg BID, recently decreased from 3x daily by cardio and baby ASA alternating 1-2 tabs. UPC and 24 hour urine attached. Denies headache, blurry vision and right upper gastric pain. Pt takes BP at home daily- states they range from 108-120 systolic and 60-80 diastolic. Pt had  delivery in 2018 and had post partum cardiomyopathy 10 weeks after delivery. Pt is followed by a heart failure specialist yearly.  Pt last saw cardio on 12/3 and has f/u scheduled for echo on 22.Patient takes her blood pressures at home once daily, BP log attached. FHR confirmed via sono at 161bpm. Level 2 sono scheduled for 23. Patient denies vaginal bleeding, leakage of fluid and cramping/ contractions.  [Definite:  ___ (Date)] : the last menstrual period was [unfilled] [Normal Amount/Duration] : was of a normal amount and duration [Spotting Between  Menses] : no spotting between menses [Regular Cycle Intervals] : periods have been regular [Menstrual Cramps] : no menstrual cramps [On BCP at conception] : the patient was not on BCP at conception

## 2023-01-25 NOTE — DISCUSSION/SUMMARY
[FreeTextEntry1] : We had the pleasure of seeing your patient for a Maternal-Fetal Medicine consultation today. She is a 37 year-old  at 20 4/7 weeks of gestation with a pregnancy complicated by the below issues.\par \par NIPS low risk\par \par She was extensively counseled regarding the following issues:\par \par History of peripartum cardiomyopathy (PPCM): Previously counseled. Recent maternal echo WNL. Follow-up next month with Cardiology is scheduled. Women with history of PPCM are at risk of recurrence with subsequent pregnancies. The risk of complications is high, particularly among women who do not have full recovery of LV function. Patients with persistent LV dysfunction (LVEF <50 percent) or LVEF =25 percent at diagnosis should be advised to avoid a subsequent pregnancy due to the risk of HF progression and death. Other patients with PPCM should also be advised of the risk of recurrence. Among women in whom LV function returns to normal (as in this case for Lang), the risk of LV dysfunction during subsequent pregnancy appears lower than for those with persistent LV dysfunction, but elevated compared with the general population. Recommend close monitoring for signs of ventricular dysfunction. Close follow-up with Cardiology.\par \par Chronic hypertension: BPs well controlled on medication. Ultrasounds for growth are recommended every 4 weeks. Antepartum fetal surveillance is recommended starting at 32 weeks. Home blood pressure monitoring is recommended. Continue low dose aspirin for preeclampsia risk reduction. Delivery recommended at 37-38 weeks.\par \par Advanced Maternal Age (AMA): NIPS low risk\par \par Obesity, class 2: Obesity is associated with an increased risk for pregnancy complications such as preeclampsia. Complications from surgery are increased, as well as failure of regional anesthesia. In addition, the fetus is at increased risk for congenital anomalies, most commonly neural tube defects and cardiac anomalies, even in the absence of diabetes.  Malformations are more difficult to assess by ultrasound evaluation due to the decreased sensitivity of ultrasound in women with a high BMI. During pregnancy, optimum weight gain recommended by the [place] of Medicine 2009 Guidelines is 11-20 pounds. Furthermore, pregnant women with obesity are at a greater risk for venous thromboembolism.\par \par Follow-up growth ultrasounds every 4 weeks\par Weekly antepartum fetal testing at 32 weeks\par Cardiology follow-up next month\par \par \par Thank you for requesting a consultation on this patient. The total time spent in preparation for this visit, medical history taking, orders, review of records, counseling the patient, and writing this note was 40 minutes.\par \par At the end of our discussion, the patient indicated that her questions were answered and she seemed satisfied with our discussion. Please do not hesitate to contact us with any questions.\par \par Sincerely,\par \par \par Orlin Stevens MD, KATHRIN\par Attending Physician, Maternal-Fetal Medicine\par

## 2023-01-25 NOTE — FAMILY HISTORY
[Reported Family History Of Birth Defects] : no congenital heart defects [Yobany-Sachs Carrier] : no Yobany-Sachs [Family History] : no mental retardation/autism [Reported Family History Of Genetic Disease] : no maternal metabolic disorder

## 2023-01-25 NOTE — CHIEF COMPLAINT
[G ___] : G [unfilled] [P ___] : P [unfilled] [de-identified] : chronic hypertension and history of postpartum cardiomyopathy

## 2023-02-05 ENCOUNTER — NON-APPOINTMENT (OUTPATIENT)
Age: 38
End: 2023-02-05

## 2023-02-07 ENCOUNTER — APPOINTMENT (OUTPATIENT)
Dept: HEART FAILURE | Facility: CLINIC | Age: 38
End: 2023-02-07
Payer: COMMERCIAL

## 2023-02-07 ENCOUNTER — NON-APPOINTMENT (OUTPATIENT)
Age: 38
End: 2023-02-07

## 2023-02-07 VITALS
DIASTOLIC BLOOD PRESSURE: 70 MMHG | WEIGHT: 256.25 LBS | HEART RATE: 82 BPM | TEMPERATURE: 99.7 F | BODY MASS INDEX: 40.22 KG/M2 | SYSTOLIC BLOOD PRESSURE: 110 MMHG | OXYGEN SATURATION: 99 % | HEIGHT: 67 IN

## 2023-02-07 VITALS — SYSTOLIC BLOOD PRESSURE: 116 MMHG | DIASTOLIC BLOOD PRESSURE: 66 MMHG

## 2023-02-07 PROCEDURE — 93000 ELECTROCARDIOGRAM COMPLETE: CPT

## 2023-02-07 PROCEDURE — 99205 OFFICE O/P NEW HI 60 MIN: CPT | Mod: 25

## 2023-02-08 NOTE — HISTORY OF PRESENT ILLNESS
[FreeTextEntry1] : Patient is a 37 year old female with history of HTN and peripartum cardiomyopathy who comes in to establish care with cardio-ob. The patient was diagnosed with HTN in 2015 and at the time she was on antihypertensives. She was subsequently transitioned to labetalol when she was trying to conceived her first child. She became pregnant and her pregnancy was unremarkable. She delivered via csection at term. About 10 weeks after her pregnancy she developed SOB and significant SHARP (she could not even push her baby in the stroller). She was initially diagnosed with a URI but eventually was found to have a low EF. She was given IV lasix and was started on GDMT. Her EF recovered within a couple of months to completely normal. She was transitioned back to labetalol  because she was trying to have a baby again and now she is 6 months pregnant with her current child. \par \par So far her pregnancy has been uncomplicated. She denies any significant dizziness, chest pain, SOB, SHARP, orthopnea, PND or lower extremity swelling.

## 2023-02-08 NOTE — ASSESSMENT
[FreeTextEntry1] : Patient is a 37 year old female with history of HTN and peripartum cardiomyopathy who is currently 6 months pregnancy comes in to establish care with cardio-ob.\par \par #Peripartum cardiomyopathy: Patient has a history of peripartum cardiomyopathy with a now recovered EF after her last pregnancy. Her EF remains normal and she currently is not complaining of any congestive symptoms. On exam she is warm and well perfused without edema\par - Will obtain baseline labs including a BNP\par - Scheduled for a repeat echo in her 3rd trimester\par - Continue with labetalol. At this time she does not require afterload reduction or lasix\par - Explained that while her EF has recovered, we still need to monitor her closely throughout her pregnancy and post partum for recurrence of her peripartum cardiomyopathy\par - Patient can undergo a vaginal delivery and only needs to undergo a Csection if indicated for obstetrical reasons or patient's preference

## 2023-02-08 NOTE — CARDIOLOGY SUMMARY
[de-identified] : 2/2023: CHRISTIN [de-identified] : 12/2022: LVEF 55-60%, normal RV, normal left and right atrium, mild MR

## 2023-02-08 NOTE — PHYSICAL EXAM
[Well Developed] : well developed [Well Nourished] : well nourished [No Acute Distress] : no acute distress [Normal Conjunctiva] : normal conjunctiva [Normal Venous Pressure] : normal venous pressure [Normal S1, S2] : normal S1, S2 [No Murmur] : no murmur [Clear Lung Fields] : clear lung fields [Good Air Entry] : good air entry [Normal Gait] : normal gait [No Edema] : no edema [No Cyanosis] : no cyanosis [No Rash] : no rash [de-identified] : gravid abdomen

## 2023-02-13 ENCOUNTER — NON-APPOINTMENT (OUTPATIENT)
Age: 38
End: 2023-02-13

## 2023-02-14 ENCOUNTER — APPOINTMENT (OUTPATIENT)
Dept: OBGYN | Facility: CLINIC | Age: 38
End: 2023-02-14
Payer: COMMERCIAL

## 2023-02-14 VITALS
SYSTOLIC BLOOD PRESSURE: 120 MMHG | WEIGHT: 257 LBS | BODY MASS INDEX: 40.34 KG/M2 | HEIGHT: 67 IN | DIASTOLIC BLOOD PRESSURE: 76 MMHG

## 2023-02-14 DIAGNOSIS — O09.522 SUPERVISION OF ELDERLY MULTIGRAVIDA, SECOND TRIMESTER: ICD-10-CM

## 2023-02-14 PROCEDURE — 81003 URINALYSIS AUTO W/O SCOPE: CPT | Mod: QW

## 2023-02-14 PROCEDURE — 0502F SUBSEQUENT PRENATAL CARE: CPT

## 2023-02-15 LAB
ALBUMIN SERPL ELPH-MCNC: 3.6 G/DL
ALP BLD-CCNC: 90 U/L
ALT SERPL-CCNC: 18 U/L
ANION GAP SERPL CALC-SCNC: 12 MMOL/L
AST SERPL-CCNC: 13 U/L
BILIRUB SERPL-MCNC: 0.4 MG/DL
BUN SERPL-MCNC: 12 MG/DL
CALCIUM SERPL-MCNC: 9.2 MG/DL
CHLORIDE SERPL-SCNC: 105 MMOL/L
CO2 SERPL-SCNC: 20 MMOL/L
CREAT SERPL-MCNC: 0.59 MG/DL
EGFR: 118 ML/MIN/1.73M2
GLUCOSE SERPL-MCNC: 96 MG/DL
NT-PROBNP SERPL-MCNC: 51 PG/ML
POTASSIUM SERPL-SCNC: 3.9 MMOL/L
PROT SERPL-MCNC: 6.3 G/DL
SODIUM SERPL-SCNC: 137 MMOL/L

## 2023-02-16 ENCOUNTER — NON-APPOINTMENT (OUTPATIENT)
Age: 38
End: 2023-02-16

## 2023-02-16 PROBLEM — O09.522 MULTIGRAVIDA OF ADVANCED MATERNAL AGE IN SECOND TRIMESTER: Status: ACTIVE | Noted: 2022-12-14

## 2023-02-16 LAB
APPEARANCE: ABNORMAL
BACTERIA UR CULT: NORMAL
BACTERIA: ABNORMAL
BILIRUBIN URINE: NEGATIVE
BLOOD URINE: NEGATIVE
COLOR: YELLOW
GLUCOSE QUALITATIVE U: NEGATIVE
HYALINE CASTS: 0 /LPF
KETONES URINE: NEGATIVE
LEUKOCYTE ESTERASE URINE: NEGATIVE
MICROSCOPIC-UA: NORMAL
NITRITE URINE: NEGATIVE
PH URINE: 6
PROTEIN URINE: NORMAL
RED BLOOD CELLS URINE: 4 /HPF
SPECIFIC GRAVITY URINE: 1.02
SQUAMOUS EPITHELIAL CELLS: 12 /HPF
UROBILINOGEN URINE: NORMAL
WHITE BLOOD CELLS URINE: 19 /HPF

## 2023-02-23 ENCOUNTER — ASOB RESULT (OUTPATIENT)
Age: 38
End: 2023-02-23

## 2023-02-23 ENCOUNTER — APPOINTMENT (OUTPATIENT)
Dept: ANTEPARTUM | Facility: CLINIC | Age: 38
End: 2023-02-23
Payer: COMMERCIAL

## 2023-02-23 PROCEDURE — 76816 OB US FOLLOW-UP PER FETUS: CPT

## 2023-02-23 PROCEDURE — 93976 VASCULAR STUDY: CPT

## 2023-02-23 PROCEDURE — 76820 UMBILICAL ARTERY ECHO: CPT | Mod: 59

## 2023-02-26 ENCOUNTER — NON-APPOINTMENT (OUTPATIENT)
Age: 38
End: 2023-02-26

## 2023-02-27 ENCOUNTER — NON-APPOINTMENT (OUTPATIENT)
Age: 38
End: 2023-02-27

## 2023-02-28 ENCOUNTER — NON-APPOINTMENT (OUTPATIENT)
Age: 38
End: 2023-02-28

## 2023-03-05 LAB
BASOPHILS # BLD AUTO: 0.03 K/UL
BASOPHILS NFR BLD AUTO: 0.4 %
EOSINOPHIL # BLD AUTO: 0.23 K/UL
EOSINOPHIL NFR BLD AUTO: 2.7 %
HCT VFR BLD CALC: 30.4 %
HGB BLD-MCNC: 9.6 G/DL
IMM GRANULOCYTES NFR BLD AUTO: 0.7 %
LYMPHOCYTES # BLD AUTO: 1.1 K/UL
LYMPHOCYTES NFR BLD AUTO: 13 %
MAN DIFF?: NORMAL
MCHC RBC-ENTMCNC: 27.5 PG
MCHC RBC-ENTMCNC: 31.6 GM/DL
MCV RBC AUTO: 87.1 FL
MONOCYTES # BLD AUTO: 0.57 K/UL
MONOCYTES NFR BLD AUTO: 6.7 %
NEUTROPHILS # BLD AUTO: 6.47 K/UL
NEUTROPHILS NFR BLD AUTO: 76.5 %
PLATELET # BLD AUTO: 230 K/UL
RBC # BLD: 3.49 M/UL
RBC # FLD: 14.8 %
T4 FREE SERPL-MCNC: 0.9 NG/DL
TSH SERPL-ACNC: 2.09 UIU/ML
WBC # FLD AUTO: 8.46 K/UL

## 2023-03-06 ENCOUNTER — NON-APPOINTMENT (OUTPATIENT)
Age: 38
End: 2023-03-06

## 2023-03-13 ENCOUNTER — NON-APPOINTMENT (OUTPATIENT)
Age: 38
End: 2023-03-13

## 2023-03-14 ENCOUNTER — APPOINTMENT (OUTPATIENT)
Dept: OBGYN | Facility: CLINIC | Age: 38
End: 2023-03-14
Payer: COMMERCIAL

## 2023-03-14 VITALS
SYSTOLIC BLOOD PRESSURE: 124 MMHG | DIASTOLIC BLOOD PRESSURE: 70 MMHG | WEIGHT: 263 LBS | BODY MASS INDEX: 41.19 KG/M2 | TEMPERATURE: 97.8 F

## 2023-03-14 PROCEDURE — 0502F SUBSEQUENT PRENATAL CARE: CPT

## 2023-03-14 PROCEDURE — 81003 URINALYSIS AUTO W/O SCOPE: CPT | Mod: QW

## 2023-03-16 LAB
CANDIDA VAG CYTO: NOT DETECTED
G VAGINALIS+PREV SP MTYP VAG QL MICRO: NOT DETECTED
T VAGINALIS VAG QL WET PREP: NOT DETECTED

## 2023-03-20 ENCOUNTER — NON-APPOINTMENT (OUTPATIENT)
Age: 38
End: 2023-03-20

## 2023-03-28 ENCOUNTER — APPOINTMENT (OUTPATIENT)
Dept: ANTEPARTUM | Facility: CLINIC | Age: 38
End: 2023-03-28
Payer: COMMERCIAL

## 2023-03-28 ENCOUNTER — ASOB RESULT (OUTPATIENT)
Age: 38
End: 2023-03-28

## 2023-03-28 PROCEDURE — 76820 UMBILICAL ARTERY ECHO: CPT | Mod: 59

## 2023-03-28 PROCEDURE — 76816 OB US FOLLOW-UP PER FETUS: CPT

## 2023-03-28 PROCEDURE — 93976 VASCULAR STUDY: CPT

## 2023-04-03 ENCOUNTER — NON-APPOINTMENT (OUTPATIENT)
Age: 38
End: 2023-04-03

## 2023-04-06 ENCOUNTER — APPOINTMENT (OUTPATIENT)
Dept: OBGYN | Facility: CLINIC | Age: 38
End: 2023-04-06
Payer: COMMERCIAL

## 2023-04-06 VITALS
SYSTOLIC BLOOD PRESSURE: 120 MMHG | HEART RATE: 80 BPM | BODY MASS INDEX: 42.38 KG/M2 | TEMPERATURE: 97.8 F | DIASTOLIC BLOOD PRESSURE: 72 MMHG | HEIGHT: 67 IN | RESPIRATION RATE: 20 BRPM | WEIGHT: 270 LBS

## 2023-04-06 PROCEDURE — 0502F SUBSEQUENT PRENATAL CARE: CPT

## 2023-04-06 PROCEDURE — 81003 URINALYSIS AUTO W/O SCOPE: CPT | Mod: QW

## 2023-04-07 ENCOUNTER — APPOINTMENT (OUTPATIENT)
Dept: MATERNAL FETAL MEDICINE | Facility: CLINIC | Age: 38
End: 2023-04-07
Payer: COMMERCIAL

## 2023-04-07 ENCOUNTER — NON-APPOINTMENT (OUTPATIENT)
Age: 38
End: 2023-04-07

## 2023-04-07 ENCOUNTER — APPOINTMENT (OUTPATIENT)
Dept: ANTEPARTUM | Facility: CLINIC | Age: 38
End: 2023-04-07

## 2023-04-07 VITALS
DIASTOLIC BLOOD PRESSURE: 64 MMHG | RESPIRATION RATE: 18 BRPM | HEART RATE: 85 BPM | SYSTOLIC BLOOD PRESSURE: 116 MMHG | OXYGEN SATURATION: 99 % | WEIGHT: 270 LBS | HEIGHT: 67 IN | BODY MASS INDEX: 42.38 KG/M2

## 2023-04-07 LAB
BILIRUB UR QL STRIP: NORMAL
GLUCOSE UR-MCNC: NORMAL
HCG UR QL: 0.2 EU/DL
HGB UR QL STRIP.AUTO: NORMAL
KETONES UR-MCNC: NORMAL
LEUKOCYTE ESTERASE UR QL STRIP: NORMAL
NITRITE UR QL STRIP: NORMAL
PH UR STRIP: 5.5
PROT UR STRIP-MCNC: NORMAL
SP GR UR STRIP: 1.01

## 2023-04-07 PROCEDURE — 99205 OFFICE O/P NEW HI 60 MIN: CPT

## 2023-04-07 PROCEDURE — 99215 OFFICE O/P EST HI 40 MIN: CPT

## 2023-04-07 NOTE — LETTER CLOSING
[If you have any questions, please do not hesitate to contact our office.] : If you have any questions, please do not hesitate to contact our office. [Sincerely,] : Sincerely,

## 2023-04-10 VITALS
SYSTOLIC BLOOD PRESSURE: 116 MMHG | BODY MASS INDEX: 42.38 KG/M2 | HEART RATE: 84 BPM | OXYGEN SATURATION: 99 % | HEIGHT: 67 IN | WEIGHT: 270 LBS | RESPIRATION RATE: 18 BRPM | DIASTOLIC BLOOD PRESSURE: 64 MMHG

## 2023-04-10 NOTE — DISCUSSION/SUMMARY
[FreeTextEntry1] : We had the pleasure of seeing Ms. Silva for a follow up Maternal-Fetal Medicine consultation today. She is a 37 year old  at 30w 6d of gestation (ZUHAIR of 6/10/23 based on LMP 9/3/22) with  \par \par History of postpartum cardiomyopathy: We discussed that women with a history of postpartum cardiomyopathy:are at increased risk of adverse events during a subsequent pregnancy compared to pregnant women without this condition.  The women at greatest risk are those who have not had full myocardial recovery (EF < 50%). She had full recovery. She was counseled that women with full myocardial recovery before the subsequent pregnancy, like herself, 21%will experience a significant decrease in EF (> = 20% from baseline), 21% will develop heart failure during pregnancy, and 14% will have a small decrease in ejection fraction. Her last transthoracic echocardiogram showed a normal EF of 55 - 60% with normal left and right atrium. She is scheduled for a follow up echocardiogram on . She has been scheduled to have a follow up visit with cardio -obstetrics on 23.\par \par Chronic Hypertension:  We discussed that women with hypertension are at an increased risk for developing superimposed preeclampsia and other adverse maternal or fetal outcomes including  birth, fetal growth restriction, oligohydramnios, placental abruption,  death, and maternal morbidity including heart failure, renal failure, and stroke.  Pulmonary edema is also 3 to 10 times more frequent in patients with chronic hypertension compared with normotensive women. Goal blood pressures should be at least (140- 150)/(90- 100) mm Hg.  I recommended continuing her Labetalol 400 mg BID.  Weekly fetal testing should be initiated at 32 weeks gestation.  We discussed that timing of delivery in her case is recommended between 37- 38 weeks of gestation \par \par History of previous  delivery: Patient desires repeat  delivery, which will be scheduled between 37- 38 weeks \par \par At the end of our discussion, the patient indicated that her questions were answered, and she seemed satisfied with our discussion. Please do not hesitate to contact us with any questions. \par \par

## 2023-04-10 NOTE — OB HISTORY
[Pregnancy History] : patient received anesthesia [LMP: ___] : LMP: [unfilled] [ZUHAIR: ___] : ZUHAIR: [unfilled] [EGA: ___ wks] : EGA: [unfilled] wks [Spontaneous] : Spontaneous conception [Sonogram] : sonogram [at ___ wks] : at [unfilled] weeks [Definite:  ___ (Date)] : the last menstrual period was [unfilled] [Normal Amount/Duration] : was of a normal amount and duration [Regular Cycle Intervals] : periods have been regular [___] : Living: [unfilled] [FreeTextEntry1] : First prenatal visit was on 11/22/2022.\par \par She had maternal-fetal medicine consultations on 12/14/2022 and 1/25/2023. [Spotting Between  Menses] : no spotting between menses [Menstrual Cramps] : no menstrual cramps [On BCP at conception] : the patient was not on BCP at conception

## 2023-04-10 NOTE — CHIEF COMPLAINT
[G ___] : G [unfilled] [P ___] : P [unfilled] [de-identified] : having chronic hypertension and history of postpartum cardiomyopathy

## 2023-04-10 NOTE — VITALS
[LMP (date): ___] : LMP was on [unfilled] [ZUHAIR by LMP (date): ___] : The calculated ZUHAIR by LMP is [unfilled] [By LMP] : this is the final UZHAIR [GA =___ Weeks] : which calculates to a GA of [unfilled] weeks [GA= ___ Days] : and [unfilled] day(s)

## 2023-04-10 NOTE — PAST MEDICAL HISTORY
Conjuntivae and eyelids appear normal,  Sclerae : White without injection [HIV Infection] : no HIV [Exposure To Gonorrhea] : no gonorrhea [Chlamydial Infections] : no chlamydia [Syphilis] : no syphilis [Herpes Simplex] : no genital herpes [Human Papilloma Virus Infection] : no genital warts [Hepatitis, B Virus] : no Hepatitis B [Hepatitis, C Virus] : no Hepatitis C [Trichomoniasis] : no trichomoniasis

## 2023-04-13 ENCOUNTER — NON-APPOINTMENT (OUTPATIENT)
Age: 38
End: 2023-04-13

## 2023-04-13 ENCOUNTER — APPOINTMENT (OUTPATIENT)
Dept: OTOLARYNGOLOGY | Facility: CLINIC | Age: 38
End: 2023-04-13
Payer: COMMERCIAL

## 2023-04-13 VITALS — WEIGHT: 270 LBS | BODY MASS INDEX: 42.38 KG/M2 | TEMPERATURE: 97.1 F | HEIGHT: 67 IN

## 2023-04-13 DIAGNOSIS — Z91.09 OTHER ALLERGY STATUS, OTHER THAN TO DRUGS AND BIOLOGICAL SUBSTANCES: ICD-10-CM

## 2023-04-13 DIAGNOSIS — J31.0 CHRONIC RHINITIS: ICD-10-CM

## 2023-04-13 PROCEDURE — 31231 NASAL ENDOSCOPY DX: CPT

## 2023-04-13 PROCEDURE — 99203 OFFICE O/P NEW LOW 30 MIN: CPT | Mod: 25

## 2023-04-13 NOTE — REVIEW OF SYSTEMS
[Ear Pain] : ear pain [Ear Itch] : ear itch [Dizziness] : dizziness [Negative] : Heme/Lymph [Patient Intake Form Reviewed] : Patient intake form was reviewed

## 2023-04-13 NOTE — PROCEDURE
[FreeTextEntry6] : Indication:  Unable to adequately examine nasal passages and sinus drainage with anterior rhinoscopy.\par The patient has nasal congestion\par \par Scope # 86\par Mild septal deviation is present on direct visualization on either side.\par Both inferior nasal turbinates are moderate in size with normal  appearing mucosa. \par The sinus endoscope was introduced into the right nares\par exam right middle meatus reveals no mucopus, polyps or inflammation.  The middle turbinate is unremarkable.\par The scope was advanced and the sphenoethmoid region was inspected.\par The superior meatus and nasal vault are unremarkable.  The nasopharynx is unremarkable without inflammation or mass\par The sinus endoscope was introduced into the left nares\par exam of the left middle meatus reveals no mucopus, polyps or inflammation and the left middle turbinate is unremarkable.\par The scope was advanced and the sphenoethmoid region was inspected.\par The left superior meatus and nasal vault are unremarkable.\par

## 2023-04-13 NOTE — HISTORY OF PRESENT ILLNESS
[de-identified] : co nasal congestion onset 2 mo ago\par co pnd and cough\par urgent care-dx eust tube dys\par rx z pack 4 weeks ago\par co imbalance now resolved\par seasonal allergies-daily zyrtec\par

## 2023-04-13 NOTE — ASSESSMENT
[FreeTextEntry1] : hypertrophy nasal turbinates\par rhinitis of pregnancy\par rec trial fluticasone\par fu prn

## 2023-04-14 ENCOUNTER — NON-APPOINTMENT (OUTPATIENT)
Age: 38
End: 2023-04-14

## 2023-04-17 ENCOUNTER — NON-APPOINTMENT (OUTPATIENT)
Age: 38
End: 2023-04-17

## 2023-04-18 ENCOUNTER — APPOINTMENT (OUTPATIENT)
Dept: ANTEPARTUM | Facility: CLINIC | Age: 38
End: 2023-04-18
Payer: COMMERCIAL

## 2023-04-18 ENCOUNTER — APPOINTMENT (OUTPATIENT)
Dept: OBGYN | Facility: CLINIC | Age: 38
End: 2023-04-18
Payer: COMMERCIAL

## 2023-04-18 ENCOUNTER — ASOB RESULT (OUTPATIENT)
Age: 38
End: 2023-04-18

## 2023-04-18 PROCEDURE — 76820 UMBILICAL ARTERY ECHO: CPT

## 2023-04-18 PROCEDURE — 93976 VASCULAR STUDY: CPT

## 2023-04-18 PROCEDURE — 76818 FETAL BIOPHYS PROFILE W/NST: CPT

## 2023-04-18 PROCEDURE — 0502F SUBSEQUENT PRENATAL CARE: CPT

## 2023-04-18 PROCEDURE — 81003 URINALYSIS AUTO W/O SCOPE: CPT | Mod: QW

## 2023-04-19 ENCOUNTER — NON-APPOINTMENT (OUTPATIENT)
Age: 38
End: 2023-04-19

## 2023-04-19 LAB
BASOPHILS # BLD AUTO: 0.02 K/UL
BASOPHILS NFR BLD AUTO: 0.2 %
BILIRUB UR QL STRIP: NORMAL
EOSINOPHIL # BLD AUTO: 0.12 K/UL
EOSINOPHIL NFR BLD AUTO: 1.4 %
GLUCOSE UR-MCNC: NORMAL
HCG UR QL: 0.2 EU/DL
HCT VFR BLD CALC: 29.5 %
HGB BLD-MCNC: 9.2 G/DL
HGB UR QL STRIP.AUTO: NORMAL
IMM GRANULOCYTES NFR BLD AUTO: 1 %
KETONES UR-MCNC: NORMAL
LEUKOCYTE ESTERASE UR QL STRIP: NORMAL
LYMPHOCYTES # BLD AUTO: 1.24 K/UL
LYMPHOCYTES NFR BLD AUTO: 15 %
MAN DIFF?: NORMAL
MCHC RBC-ENTMCNC: 27.4 PG
MCHC RBC-ENTMCNC: 31.2 GM/DL
MCV RBC AUTO: 87.8 FL
MONOCYTES # BLD AUTO: 0.42 K/UL
MONOCYTES NFR BLD AUTO: 5.1 %
NEUTROPHILS # BLD AUTO: 6.4 K/UL
NEUTROPHILS NFR BLD AUTO: 77.3 %
NITRITE UR QL STRIP: NORMAL
PH UR STRIP: 6.5
PLATELET # BLD AUTO: 216 K/UL
PROT UR STRIP-MCNC: NORMAL
RBC # BLD: 3.36 M/UL
RBC # FLD: 14.9 %
SP GR UR STRIP: 1.02
WBC # FLD AUTO: 8.28 K/UL

## 2023-04-20 ENCOUNTER — NON-APPOINTMENT (OUTPATIENT)
Age: 38
End: 2023-04-20

## 2023-04-25 ENCOUNTER — ASOB RESULT (OUTPATIENT)
Age: 38
End: 2023-04-25

## 2023-04-25 ENCOUNTER — APPOINTMENT (OUTPATIENT)
Dept: ANTEPARTUM | Facility: CLINIC | Age: 38
End: 2023-04-25
Payer: COMMERCIAL

## 2023-04-25 ENCOUNTER — APPOINTMENT (OUTPATIENT)
Dept: ANTEPARTUM | Facility: CLINIC | Age: 38
End: 2023-04-25

## 2023-04-25 PROCEDURE — ZZZZZ: CPT

## 2023-04-25 PROCEDURE — 76816 OB US FOLLOW-UP PER FETUS: CPT

## 2023-04-25 PROCEDURE — 76820 UMBILICAL ARTERY ECHO: CPT | Mod: 59

## 2023-04-25 PROCEDURE — 93976 VASCULAR STUDY: CPT

## 2023-04-25 PROCEDURE — 76818 FETAL BIOPHYS PROFILE W/NST: CPT | Mod: 59

## 2023-05-01 ENCOUNTER — NON-APPOINTMENT (OUTPATIENT)
Age: 38
End: 2023-05-01

## 2023-05-02 ENCOUNTER — ASOB RESULT (OUTPATIENT)
Age: 38
End: 2023-05-02

## 2023-05-02 ENCOUNTER — APPOINTMENT (OUTPATIENT)
Dept: OBGYN | Facility: CLINIC | Age: 38
End: 2023-05-02
Payer: COMMERCIAL

## 2023-05-02 ENCOUNTER — APPOINTMENT (OUTPATIENT)
Dept: HEART FAILURE | Facility: CLINIC | Age: 38
End: 2023-05-02
Payer: COMMERCIAL

## 2023-05-02 ENCOUNTER — APPOINTMENT (OUTPATIENT)
Dept: ANTEPARTUM | Facility: CLINIC | Age: 38
End: 2023-05-02
Payer: COMMERCIAL

## 2023-05-02 VITALS
BODY MASS INDEX: 41.75 KG/M2 | SYSTOLIC BLOOD PRESSURE: 122 MMHG | HEIGHT: 67 IN | WEIGHT: 266 LBS | DIASTOLIC BLOOD PRESSURE: 78 MMHG | OXYGEN SATURATION: 98 % | TEMPERATURE: 98.7 F | HEART RATE: 86 BPM

## 2023-05-02 VITALS
HEIGHT: 67 IN | DIASTOLIC BLOOD PRESSURE: 80 MMHG | WEIGHT: 268 LBS | SYSTOLIC BLOOD PRESSURE: 118 MMHG | BODY MASS INDEX: 42.06 KG/M2

## 2023-05-02 PROCEDURE — 76820 UMBILICAL ARTERY ECHO: CPT

## 2023-05-02 PROCEDURE — 90471 IMMUNIZATION ADMIN: CPT

## 2023-05-02 PROCEDURE — 90715 TDAP VACCINE 7 YRS/> IM: CPT

## 2023-05-02 PROCEDURE — 76818 FETAL BIOPHYS PROFILE W/NST: CPT

## 2023-05-02 PROCEDURE — 99214 OFFICE O/P EST MOD 30 MIN: CPT

## 2023-05-02 PROCEDURE — 81003 URINALYSIS AUTO W/O SCOPE: CPT | Mod: QW

## 2023-05-02 PROCEDURE — 93976 VASCULAR STUDY: CPT

## 2023-05-02 PROCEDURE — 0502F SUBSEQUENT PRENATAL CARE: CPT

## 2023-05-02 RX ORDER — FLUTICASONE PROPIONATE 50 UG/1
50 SPRAY, METERED NASAL DAILY
Qty: 1 | Refills: 5 | Status: DISCONTINUED | COMMUNITY
Start: 2023-04-13 | End: 2023-05-02

## 2023-05-03 ENCOUNTER — APPOINTMENT (OUTPATIENT)
Dept: ANTEPARTUM | Facility: CLINIC | Age: 38
End: 2023-05-03

## 2023-05-03 ENCOUNTER — APPOINTMENT (OUTPATIENT)
Dept: MATERNAL FETAL MEDICINE | Facility: CLINIC | Age: 38
End: 2023-05-03
Payer: COMMERCIAL

## 2023-05-03 VITALS
DIASTOLIC BLOOD PRESSURE: 68 MMHG | HEIGHT: 67 IN | RESPIRATION RATE: 18 BRPM | BODY MASS INDEX: 41.91 KG/M2 | HEART RATE: 97 BPM | WEIGHT: 267 LBS | SYSTOLIC BLOOD PRESSURE: 112 MMHG | OXYGEN SATURATION: 99 %

## 2023-05-03 VITALS
WEIGHT: 267 LBS | OXYGEN SATURATION: 99 % | HEIGHT: 67 IN | RESPIRATION RATE: 18 BRPM | DIASTOLIC BLOOD PRESSURE: 68 MMHG | SYSTOLIC BLOOD PRESSURE: 112 MMHG | HEART RATE: 97 BPM | BODY MASS INDEX: 41.91 KG/M2

## 2023-05-03 DIAGNOSIS — O34.219 MATERNAL CARE FOR UNSPECIFIED TYPE SCAR FROM PREVIOUS CESAREAN DELIVERY: ICD-10-CM

## 2023-05-03 DIAGNOSIS — O09.899 SUPERVISION OF OTHER HIGH RISK PREGNANCIES, UNSPECIFIED TRIMESTER: ICD-10-CM

## 2023-05-03 DIAGNOSIS — Z3A.34 34 WEEKS GESTATION OF PREGNANCY: ICD-10-CM

## 2023-05-03 LAB
BILIRUB UR QL STRIP: NORMAL
GLUCOSE UR-MCNC: NORMAL
HCG UR QL: 0.2 EU/DL
HCT VFR BLD CALC: 31.4 %
HGB BLD-MCNC: 9.4 G/DL
HGB UR QL STRIP.AUTO: NORMAL
KETONES UR-MCNC: NORMAL
LEUKOCYTE ESTERASE UR QL STRIP: NORMAL
MCHC RBC-ENTMCNC: 27.3 PG
MCHC RBC-ENTMCNC: 29.9 GM/DL
MCV RBC AUTO: 91.3 FL
NITRITE UR QL STRIP: NORMAL
PH UR STRIP: 6.5
PLATELET # BLD AUTO: 251 K/UL
PROT UR STRIP-MCNC: NORMAL
RBC # BLD: 3.44 M/UL
RBC # FLD: 15.5 %
SP GR UR STRIP: 1.02
WBC # FLD AUTO: 7.12 K/UL

## 2023-05-03 PROCEDURE — 99215 OFFICE O/P EST HI 40 MIN: CPT

## 2023-05-03 NOTE — CARDIOLOGY SUMMARY
[de-identified] : 2/2023: CHRISTIN [de-identified] : 12/2022: LVEF 55-60%, normal RV, normal left and right atrium, mild MR

## 2023-05-03 NOTE — DISCUSSION/SUMMARY
[FreeTextEntry1] : She is 34 weeks and 4 days gestation by her last menstrual period dates.\par \par History of postpartum cardiomyopathy: She had consultations with Dr. Kirk on 2023 and 2023 regarding her cardiac status during pregnancy. She had an echocardiogram on 2023 that reported the left ventricular ejection fraction to be between 55 to 60%, which is within normal limits.  Diastolic function was reported to be normal.  She is having a BMP done. I reminded her that women with full myocardial recovery before the subsequent pregnancy, like herself, can have a significant decrease in EF (> = 20% from baseline), can develop heart failure, and can have a small decrease in ejection fraction during a subsequent pregnancy. Her cardiac function has been WNL so far.\par \par Chronic Hypertension: She takes labetalol 400 mg twice daily.  She also takes daily baby aspirin to reduce her risk of developing preeclampsia.  She is also having a repeat  delivery at 37 weeks gestation to decrease her chance of developing superimposed preeclampsia and peripartum cardiomyopathy.  She has been doing self blood pressure monitoring at home and reports that her blood pressure values are within normal limits.  I recommended having systolic blood pressures between 120 -140 and diastolic blood pressures between 80 and 90.  Her blood pressure today was 112/68.  She denies having headaches, epigastric pain, and visual disturbances.  I recommended continuing her Labetalol 400 mg BID.  She was advised to continue having weekly fetal testing until delivery. She was also advised to have an ultrasound examination in 3 weeks to evaluate fetal growth.\par \par Anemia during pregnancy: She was recently diagnosed with anemia and is taking iron supplementation.  I advised her to continue taking her prenatal vitamin and the prescribed iron supplementation.  I told her that spinach, lentils, dried fruit, tofu, red meat, poultry, and fish are dietary sources of iron. I told her that anemia during pregnancy increases the risk of having a  delivery or a low birth weight baby.  She told me that she is going to see a hematologist to discuss the best treatment for her anemia.\par

## 2023-05-03 NOTE — HISTORY OF PRESENT ILLNESS
[FreeTextEntry1] : Patient is a 37 year old female with history of HTN and peripartum cardiomyopathy who comes in to establish care with cardio-ob. The patient was diagnosed with HTN in 2015 and at the time she was on antihypertensives. She was subsequently transitioned to labetalol when she was trying to conceived her first child. She became pregnant and her pregnancy was unremarkable. She delivered via csection at term. About 10 weeks after her pregnancy she developed SOB and significant SHARP (she could not even push her baby in the stroller). She was initially diagnosed with a URI but eventually was found to have a low EF. She was given IV lasix and was started on GDMT. Her EF recovered within a couple of months to completely normal. She was transitioned back to labetalol  because she was trying to have a baby again and now she is 6 months pregnant with her current child. \par \par 5/2/23: The patient comes in for routine visit in her third trimester of pregnancy.. She denies any significant dizziness, chest pain, SOB, SHARP, orthopnea, PND or lower extremity swelling. She is scheduled to undergo a  planned Csection at 39 weeks. She has been feeling well and taking her labetalol. Her blood pressure has been controlled on this regimen

## 2023-05-03 NOTE — DISCUSSION/SUMMARY
Assessment & Plan     Balance problems  Patient is having persistent symptoms despite previous therapies. Symptoms certainly sound neurologic in nature- maybe lumbar herniations may explain- planning to see Dr Cervantes, also [place]    Mixed hyperlipidemia  Control uncertain,continue current medications at current doses pending labs  - atorvastatin (LIPITOR) 40 MG tablet  Dispense: 90 tablet; Refill: 1  - fenofibrate (TRIGLIDE/LOFIBRA) 160 MG tablet  Dispense: 90 tablet; Refill: 1  - Lipid Panel (BFP)  - Comprehensive Metobolic Panel (BFP)  - VENOUS COLLECTION    Essential hypertension, benign (HTN)  High today, discussed options- we agree to try just bumping up propranolol knowing this may not be best BP med but it has helped his anxiety-could consider multi dosing rather than once daily ,Check blood pressure readings outside of the clinic several times per week, write down values, and follow up if elevated within the next several weeks. Blood pressure can be checked at the firestation, drugstore,  or any valid site.   - Comprehensive Metobolic Panel (BFP)  - VENOUS COLLECTION  - propranolol (INDERAL) 40 MG tablet  Dispense: 90 tablet; Refill: 1    JORGE (generalized anxiety disorder)  stable symptomatically , .continue current medications at current doses   - sertraline (ZOLOFT) 100 MG tablet  Dispense: 135 tablet; Refill: 1  - propranolol (INDERAL) 40 MG tablet  Dispense: 90 tablet; Refill: 1    Methylenetetrahydrofolate reductase (MTHFR) deficiency (H)    - L-Methylfolate-B6-B12 (FOLTX) 1.13-25-2 MG TABS  Dispense: 90 tablet; Refill: 1    Elevated homocysteine    - L-Methylfolate-B6-B12 (FOLTX) 1.13-25-2 MG TABS  Dispense: 90 tablet; Refill: 1    Natarajan's esophagus with dysplasia  stable symptomatically , continue current medications at current doses   - omeprazole (PRILOSEC) 40 MG DR capsule  Dispense: 90 capsule; Refill: 1    Cervical spondylosis with myelopathy  Had surgery, notes reviewed    Lumbar disc  herniation  Newer issue-potentially an etiology for foot symptoms , f/u with neurology as planned    Special screening for malignant neoplasms, colon    - GASTROENTEROLOGY ADULT REF PROCEDURE ONLY      Review of external notes as documented elsewhere in note  Review of the result(s) of each unique test - labs         FUTURE APPOINTMENTS:       - Follow-up visit in 6 mo  Regular exercise    No follow-ups on file.    Woody Cintron MD  [facility]    Subjective   Edvin is a 64 year old who presents for the following health issues     HPI       Hyperlipidemia Follow-Up      Are you regularly taking any medication or supplement to lower your cholesterol?   Yes- atorvastatin    Are you having muscle aches or other side effects that you think could be caused by your cholesterol lowering medication?  No    Hypertension Follow-up      Do you check your blood pressure regularly outside of the clinic? No     Are you following a low salt diet? No    Are your blood pressures ever more than 140 on the top number (systolic) OR more   than 90 on the bottom number (diastolic), for example 140/90? Yes    Anxiety Bnbiws-To-momo sertraline and propranolol 20 mg daily    How are you doing with your anxiety since your last visit? Worsened slightly with health issues    Are you having other symptoms that might be associated with anxiety? No    Have you had a significant life event? Health Concerns     Are you feeling depressed? No    Do you have any concerns with your use of alcohol or other drugs? No    Social History     Tobacco Use     Smoking status: Never Smoker     Smokeless tobacco: Former User     Types: Chew   Substance Use Topics     Alcohol use: Yes     Alcohol/week: 14.0 standard drinks     Types: 14 Standard drinks or equivalent per week     Drug use: No     JORGE-7 SCORE 9/3/2019 3/26/2020 9/25/2020   Total Score - - -   Total Score 0 0 0     PHQ 9/3/2019 3/26/2020 9/25/2020   PHQ-9 Total Score 0 2  "0   Q9: Thoughts of better off dead/self-harm past 2 weeks Not at all Not at all Not at all     See flowsheets    Pt still struggling with balance issues- main problem is pain issues in feet- saw Dr Cervantes neurology 2/16/21- notes reviewed, was told cervical spine issues not affecting balance, was sent for lumbar MRI and told he has herniated discs-has not had f/u with Dr Cervantes to go over plans.    In the meantime his family has set him up for an evaluation at [facility] which is in the works as far as scheduling.    Pt exercises daily on recumbant bike, is not allowed to do much at home as family very worried about his gait.        How many servings of fruits and vegetables do you eat daily?  2-3    On average, how many sweetened beverages do you drink each day (Examples: soda, juice, sweet tea, etc.  Do NOT count diet or artificially sweetened beverages)?   1    How many days per week do you exercise enough to make your heart beat faster? 5    How many minutes a day do you exercise enough to make your heart beat faster? 30 - 60    How many days per week do you miss taking your medication? 0        Review of Systems   Constitutional, HEENT, cardiovascular, pulmonary, gi and gu systems are negative, except as otherwise noted.      Objective    BP (!) 156/92 (BP Location: Right arm, Patient Position: Chair, Cuff Size: Child)   Pulse 84   Temp 97.6  F (36.4  C)   Resp 20   Ht 1.727 m (5' 8\")   Wt 99.3 kg (219 lb)   BMI 33.30 kg/m    Body mass index is 33.3 kg/m .  Physical Exam   GENERAL: healthy, alert and no distress  EYES: Eyes grossly normal to inspection, PERRL and conjunctivae and sclerae normal  HENT: ear canals and TM's normal, nose and mouth without ulcers or lesions  NECK: no adenopathy, no asymmetry, masses, or scars and thyroid normal to palpation  RESP: lungs clear to auscultation - no rales, rhonchi or wheezes  CV: regular rate and rhythm, normal S1 S2, no S3 or S4, no murmur, click or rub, no peripheral " [___ Month(s)] : in [unfilled] month(s) edema and peripheral pulses strong  ABDOMEN: soft, nontender, no hepatosplenomegaly, no masses and bowel sounds normal  MS: no gross musculoskeletal defects noted, no edema  PSYCH: mentation appears normal, affect normal/bright    Results for orders placed or performed in visit on 03/01/21 (from the past 24 hour(s))   Lipid Panel (BFP)   Result Value Ref Range    Cholesterol 222 (A) 0 - 199 mg/dL    Triglycerides 237 (A) 0 - 149 mg/dL    HDL Cholesterol 57 40 - 150 mg/dL    LDL Cholesterol Direct 118 0 - 130 mg/dL    Cholesterol/HDL Ratio 4 0 - 5   Comprehensive Metobolic Panel (BFP)   Result Value Ref Range    Carbon Dioxide 27.8 20 - 32 mmol/L    Creatinine 1.14 0.60 - 1.30 mg/dL    Glucose 93 60 - 99 mg/dL    Sodium 143.6 135 - 146 mmol/L    Potassium 5.39 (A) 3.5 - 5.3 mmol/L    Chloride 106.1 98 - 110 mmol/L    Protein Total 7.2 6.1 - 8.1 g/dL    Albumin 4.8 3.6 - 5.1 g/dL    Alkaline Phosphatase 47 33 - 130 U/L    ALT 51 (A) 0 - 32 U/L    AST 67 (A) 0 - 35 U/L    Bilirubin Total 0.9 0.2 - 1.2 mg/dL    Urea Nitrogen 15 7 - 25 mg/dL    Calcium 9.8 8.6 - 10.3 mg/dL    BUN/Creatinine Ratio 13.2 6 - 22    Globulin Calculated 2.4 1.9 - 3.7    A/G Ratio 2.0 1 - 2.5

## 2023-05-03 NOTE — CHIEF COMPLAINT
[G ___] : G [unfilled] [P ___] : P [unfilled] [de-identified] : having chronic hypertension and history of postpartum cardiomyopathy

## 2023-05-03 NOTE — ASSESSMENT
[FreeTextEntry1] : Patient is a 37 year old female with history of HTN and peripartum cardiomyopathy who is currently 6 months pregnancy comes in to establish care with cardio-ob.\par \par #Peripartum cardiomyopathy: Patient has a history of peripartum cardiomyopathy with a now recovered EF after her last pregnancy.She had a recent echo in April 2023 which showed normal biventricular function/  On exam she is warm and well perfused without edema\par -Will obtain a repeat BNP, last one was normal\par - Continue with labetalol. At this time she does not require afterload reduction or lasix\par - Explained that while her EF has recovered, we still need to monitor her closely throughout her pregnancy and post partum for recurrence of her peripartum cardiomyopathy\par - Patient will undergo elective Csection at 39 weeks, there are no contraindications to this method of delivery\par \par \par #HTN\par controlled on labetalol

## 2023-05-03 NOTE — PHYSICAL EXAM
[Well Developed] : well developed [Well Nourished] : well nourished [No Acute Distress] : no acute distress [Normal Conjunctiva] : normal conjunctiva [Normal Venous Pressure] : normal venous pressure [Normal S1, S2] : normal S1, S2 [No Murmur] : no murmur [Clear Lung Fields] : clear lung fields [Good Air Entry] : good air entry [Normal Gait] : normal gait [No Edema] : no edema [No Cyanosis] : no cyanosis [No Rash] : no rash [de-identified] : gravid abdomen

## 2023-05-04 ENCOUNTER — NON-APPOINTMENT (OUTPATIENT)
Age: 38
End: 2023-05-04

## 2023-05-04 ENCOUNTER — RESULT REVIEW (OUTPATIENT)
Age: 38
End: 2023-05-04

## 2023-05-04 ENCOUNTER — APPOINTMENT (OUTPATIENT)
Dept: HEMATOLOGY ONCOLOGY | Facility: CLINIC | Age: 38
End: 2023-05-04
Payer: COMMERCIAL

## 2023-05-04 ENCOUNTER — APPOINTMENT (OUTPATIENT)
Dept: HEMATOLOGY ONCOLOGY | Facility: CLINIC | Age: 38
End: 2023-05-04

## 2023-05-04 ENCOUNTER — OUTPATIENT (OUTPATIENT)
Dept: OUTPATIENT SERVICES | Facility: HOSPITAL | Age: 38
LOS: 1 days | Discharge: HOME | End: 2023-05-04

## 2023-05-04 VITALS
BODY MASS INDEX: 41.99 KG/M2 | HEIGHT: 67 IN | SYSTOLIC BLOOD PRESSURE: 117 MMHG | RESPIRATION RATE: 18 BRPM | TEMPERATURE: 98.2 F | DIASTOLIC BLOOD PRESSURE: 77 MMHG | HEART RATE: 85 BPM | OXYGEN SATURATION: 98 % | WEIGHT: 267.5 LBS

## 2023-05-04 DIAGNOSIS — R39.89 OTHER SYMPTOMS AND SIGNS INVOLVING THE GENITOURINARY SYSTEM: ICD-10-CM

## 2023-05-04 DIAGNOSIS — D64.9 ANEMIA, UNSPECIFIED: ICD-10-CM

## 2023-05-04 DIAGNOSIS — N94.9 UNSPECIFIED CONDITION ASSOCIATED WITH FEMALE GENITAL ORGANS AND MENSTRUAL CYCLE: ICD-10-CM

## 2023-05-04 DIAGNOSIS — D17.1 BENIGN LIPOMATOUS NEOPLASM OF SKIN AND SUBCUTANEOUS TISSUE OF TRUNK: Chronic | ICD-10-CM

## 2023-05-04 DIAGNOSIS — Z98.891 HISTORY OF UTERINE SCAR FROM PREVIOUS SURGERY: Chronic | ICD-10-CM

## 2023-05-04 LAB
BASOPHILS # BLD AUTO: 0.02 K/UL — SIGNIFICANT CHANGE UP (ref 0–0.2)
BASOPHILS NFR BLD AUTO: 0.3 % — SIGNIFICANT CHANGE UP (ref 0–2)
EOSINOPHIL # BLD AUTO: 0.11 K/UL — SIGNIFICANT CHANGE UP (ref 0–0.5)
EOSINOPHIL NFR BLD AUTO: 1.5 % — SIGNIFICANT CHANGE UP (ref 0–6)
HCT VFR BLD CALC: 29.1 % — LOW (ref 34.5–45)
HGB BLD-MCNC: 9.8 G/DL — LOW (ref 11.5–15.5)
IMM GRANULOCYTES NFR BLD AUTO: 0.8 % — SIGNIFICANT CHANGE UP (ref 0–0.9)
LYMPHOCYTES # BLD AUTO: 1.07 K/UL — SIGNIFICANT CHANGE UP (ref 1–3.3)
LYMPHOCYTES # BLD AUTO: 14.5 % — SIGNIFICANT CHANGE UP (ref 13–44)
MCHC RBC-ENTMCNC: 27.9 PG — SIGNIFICANT CHANGE UP (ref 27–34)
MCHC RBC-ENTMCNC: 33.7 GM/DL — SIGNIFICANT CHANGE UP (ref 32–36)
MCV RBC AUTO: 82.9 FL — SIGNIFICANT CHANGE UP (ref 80–100)
MONOCYTES # BLD AUTO: 0.6 K/UL — SIGNIFICANT CHANGE UP (ref 0–0.9)
MONOCYTES NFR BLD AUTO: 8.1 % — SIGNIFICANT CHANGE UP (ref 2–14)
NEUTROPHILS # BLD AUTO: 5.53 K/UL — SIGNIFICANT CHANGE UP (ref 1.8–7.4)
NEUTROPHILS NFR BLD AUTO: 74.8 % — SIGNIFICANT CHANGE UP (ref 43–77)
NRBC # BLD: 0 /100 WBCS — SIGNIFICANT CHANGE UP (ref 0–0)
PLATELET # BLD AUTO: 219 K/UL — SIGNIFICANT CHANGE UP (ref 150–400)
RBC # BLD: 3.51 M/UL — LOW (ref 3.8–5.2)
RBC # FLD: 14.5 % — SIGNIFICANT CHANGE UP (ref 10.3–14.5)
WBC # BLD: 7.39 K/UL — SIGNIFICANT CHANGE UP (ref 3.8–10.5)
WBC # FLD AUTO: 7.39 K/UL — SIGNIFICANT CHANGE UP (ref 3.8–10.5)

## 2023-05-04 PROCEDURE — 99205 OFFICE O/P NEW HI 60 MIN: CPT

## 2023-05-05 ENCOUNTER — APPOINTMENT (OUTPATIENT)
Dept: INFUSION THERAPY | Facility: CLINIC | Age: 38
End: 2023-05-05

## 2023-05-05 LAB
ERYTHROCYTE [SEDIMENTATION RATE] IN BLOOD: 68 MM/HR — HIGH (ref 0–15)
FERRITIN SERPL-MCNC: 54 NG/ML — SIGNIFICANT CHANGE UP (ref 15–150)
FOLATE SERPL-MCNC: 17.9 NG/ML — SIGNIFICANT CHANGE UP
IRON SATN MFR SERPL: 45 UG/DL — SIGNIFICANT CHANGE UP (ref 30–160)
IRON SATN MFR SERPL: 9 % — LOW (ref 14–50)
NT-PROBNP SERPL-MCNC: 67 PG/ML
TIBC SERPL-MCNC: 522 UG/DL — HIGH (ref 220–430)
UIBC SERPL-MCNC: 477 UG/DL — HIGH (ref 110–370)
VIT B12 SERPL-MCNC: 196 PG/ML — LOW (ref 232–1245)

## 2023-05-05 NOTE — RESULTS/DATA
[FreeTextEntry1] : WBC: 7.39,  Hgb: 9.8,  Hct: 29.1,  MCV: 82.9,  Plts: 219\par Ferritin, TSAT, ESR, B12, Folate: pending

## 2023-05-05 NOTE — ASSESSMENT
[FreeTextEntry1] : Patient is a 37 y.o. F, currently 34 weeks pregnant, with an anemia, MCV low end of normal. \par No nutritional studies but has had a low MCV in the past. \par Suspect she will have iron deficiency anemia. \par She is scheduled for a  on 23 - which unfortunately only allows us ~ 2 1/2 weeks for any intervention to be done if needed. \par PAtient still working full time - her schedule allows her to come in for IV iron on  and .  \par Will schedule her for IV Venofer tomorrow (Friday), then M, F the following week, and then 23.  \par This will allow for 4 infusions pre-delivery. \par Will check the results of her labs tomorrow and if the plan needs to be changed we will edit. \par Possible side effects of IV iron reviewed - aware of potential for allergic reaction - possible symptoms discussed included anaphylactic reactions.  Discussed true anaphylactic reactions are rare, <1%, but if this should occur she may need to be taken in an ambulance to the ER.  There can be staining of the skin if the IV infiltrates, joint pains, and flu-like symptoms.  She is not planning on getting a MRI soon. \par  All questions answered.  Consent form signed. \par  \par PCP [facility] \par Dr. Jian Cedeño (GYN)\par Dr. Austin Herrera (M)\par Dr. Tai Ward (Cardio)\par Dr. Gail Kirk (Cardio)\par

## 2023-05-05 NOTE — PHYSICAL EXAM
[Obese] : obese [Normal] : affect appropriate [de-identified] : no rashes [de-identified] : 3rd trimester abdomen

## 2023-05-05 NOTE — HISTORY OF PRESENT ILLNESS
[de-identified] : LUCAS LOAIZA is a 37 y.o. F with a PMH significant for HTN and postpartum cardiomyopathy, currently 34 weeks pregnant, who was referred to our office for an evaluation of anemia. \par \par 22 - Hgb: 11.2,  Hct: 34.5,  MCV: 82.5,  Plts: 257, Hgb electrophoresis was normal. \par 22   - Hgb:  9.2,  Hct: 29.5,  MCV: 87.8,  Plts: 216\par No recent anemia studies. \par \par Patient has one other child delivered 18 via . Hgb after was 9.7.  \par LMP: 22\par Due Date: 23, although due to her hx of HTN  the plan is to have her deliver at 37 weeks to help reduce the incidence of her developing another episode of postpartum cardiomyopathy. \par  is scheduled for 23, pre-op testing for 23. \par Patient is currently working as a teacher.  She has normal pregnancy fatigue and SOB, but denies pica, dizziness, or muscular issues. \par She reports her prior periods were normal.  Has no personal or family hx of bleeding or clotting disorders. \par No prior blood transfusions or IV iron.  Has not had a GI eval yet. \par She states she started on PO iron ~ 2 days ago.

## 2023-05-08 ENCOUNTER — NON-APPOINTMENT (OUTPATIENT)
Age: 38
End: 2023-05-08

## 2023-05-08 DIAGNOSIS — O99.013 ANEMIA COMPLICATING PREGNANCY, THIRD TRIMESTER: ICD-10-CM

## 2023-05-08 DIAGNOSIS — E53.8 DEFICIENCY OF OTHER SPECIFIED B GROUP VITAMINS: ICD-10-CM

## 2023-05-08 DIAGNOSIS — D50.9 IRON DEFICIENCY ANEMIA, UNSPECIFIED: ICD-10-CM

## 2023-05-09 ENCOUNTER — APPOINTMENT (OUTPATIENT)
Dept: ANTEPARTUM | Facility: CLINIC | Age: 38
End: 2023-05-09
Payer: COMMERCIAL

## 2023-05-09 ENCOUNTER — ASOB RESULT (OUTPATIENT)
Age: 38
End: 2023-05-09

## 2023-05-09 PROCEDURE — 76818 FETAL BIOPHYS PROFILE W/NST: CPT

## 2023-05-09 PROCEDURE — 76820 UMBILICAL ARTERY ECHO: CPT

## 2023-05-10 ENCOUNTER — NON-APPOINTMENT (OUTPATIENT)
Age: 38
End: 2023-05-10

## 2023-05-10 ENCOUNTER — APPOINTMENT (OUTPATIENT)
Dept: OBGYN | Facility: CLINIC | Age: 38
End: 2023-05-10
Payer: COMMERCIAL

## 2023-05-10 ENCOUNTER — APPOINTMENT (OUTPATIENT)
Dept: INFUSION THERAPY | Facility: CLINIC | Age: 38
End: 2023-05-10

## 2023-05-10 VITALS
WEIGHT: 271 LBS | HEIGHT: 67 IN | RESPIRATION RATE: 16 BRPM | SYSTOLIC BLOOD PRESSURE: 120 MMHG | DIASTOLIC BLOOD PRESSURE: 74 MMHG | BODY MASS INDEX: 42.53 KG/M2

## 2023-05-10 DIAGNOSIS — Z34.93 ENCOUNTER FOR SUPERVISION OF NORMAL PREGNANCY, UNSPECIFIED, THIRD TRIMESTER: ICD-10-CM

## 2023-05-10 PROCEDURE — 0502F SUBSEQUENT PRENATAL CARE: CPT

## 2023-05-10 PROCEDURE — 76816 OB US FOLLOW-UP PER FETUS: CPT

## 2023-05-10 PROCEDURE — 81003 URINALYSIS AUTO W/O SCOPE: CPT | Mod: QW

## 2023-05-10 RX ORDER — ESOMEPRAZOLE MAGNESIUM 40 MG/1
1 CAPSULE, DELAYED RELEASE ORAL
Qty: 0 | Refills: 0 | DISCHARGE

## 2023-05-10 RX ORDER — CHOLECALCIFEROL (VITAMIN D3) 125 MCG
1 CAPSULE ORAL
Qty: 0 | Refills: 0 | DISCHARGE

## 2023-05-10 RX ORDER — RANITIDINE HYDROCHLORIDE 150 MG/1
1 TABLET, FILM COATED ORAL
Qty: 0 | Refills: 0 | DISCHARGE

## 2023-05-15 ENCOUNTER — NON-APPOINTMENT (OUTPATIENT)
Age: 38
End: 2023-05-15

## 2023-05-15 ENCOUNTER — APPOINTMENT (OUTPATIENT)
Dept: INFUSION THERAPY | Facility: CLINIC | Age: 38
End: 2023-05-15

## 2023-05-15 VITALS
RESPIRATION RATE: 17 BRPM | DIASTOLIC BLOOD PRESSURE: 72 MMHG | HEART RATE: 90 BPM | OXYGEN SATURATION: 98 % | TEMPERATURE: 98 F | SYSTOLIC BLOOD PRESSURE: 106 MMHG

## 2023-05-16 ENCOUNTER — ASOB RESULT (OUTPATIENT)
Age: 38
End: 2023-05-16

## 2023-05-16 ENCOUNTER — APPOINTMENT (OUTPATIENT)
Dept: ANTEPARTUM | Facility: CLINIC | Age: 38
End: 2023-05-16
Payer: COMMERCIAL

## 2023-05-16 ENCOUNTER — APPOINTMENT (OUTPATIENT)
Dept: OBGYN | Facility: CLINIC | Age: 38
End: 2023-05-16
Payer: COMMERCIAL

## 2023-05-16 VITALS
BODY MASS INDEX: 42.38 KG/M2 | HEIGHT: 67 IN | WEIGHT: 270 LBS | SYSTOLIC BLOOD PRESSURE: 120 MMHG | DIASTOLIC BLOOD PRESSURE: 74 MMHG

## 2023-05-16 DIAGNOSIS — O10.919 UNSPECIFIED PRE-EXISTING HYPERTENSION COMPLICATING PREGNANCY, UNSPECIFIED TRIMESTER: ICD-10-CM

## 2023-05-16 DIAGNOSIS — O99.013 ANEMIA COMPLICATING PREGNANCY, THIRD TRIMESTER: ICD-10-CM

## 2023-05-16 DIAGNOSIS — O09.523 SUPERVISION OF ELDERLY MULTIGRAVIDA, THIRD TRIMESTER: ICD-10-CM

## 2023-05-16 LAB
ALBUMIN SERPL ELPH-MCNC: 3.5 G/DL
ALP BLD-CCNC: 132 U/L
ALT SERPL-CCNC: 35 U/L
ANION GAP SERPL CALC-SCNC: 17 MMOL/L
AST SERPL-CCNC: 23 U/L
BASOPHILS # BLD AUTO: 0.02 K/UL
BASOPHILS NFR BLD AUTO: 0.3 %
BILIRUB SERPL-MCNC: 0.7 MG/DL
BUN SERPL-MCNC: 10 MG/DL
CALCIUM SERPL-MCNC: 8.9 MG/DL
CHLORIDE SERPL-SCNC: 99 MMOL/L
CO2 SERPL-SCNC: 19 MMOL/L
CREAT SERPL-MCNC: 0.67 MG/DL
EGFR: 115 ML/MIN/1.73M2
EOSINOPHIL # BLD AUTO: 0.1 K/UL
EOSINOPHIL NFR BLD AUTO: 1.5 %
GLUCOSE SERPL-MCNC: 66 MG/DL
GP B STREP DNA SPEC QL NAA+PROBE: NOT DETECTED
HCT VFR BLD CALC: 32.3 %
HGB BLD-MCNC: 9.9 G/DL
HIV1+2 AB SPEC QL IA.RAPID: NONREACTIVE
IMM GRANULOCYTES NFR BLD AUTO: 0.6 %
LYMPHOCYTES # BLD AUTO: 0.94 K/UL
LYMPHOCYTES NFR BLD AUTO: 14.2 %
MAN DIFF?: NORMAL
MCHC RBC-ENTMCNC: 28.1 PG
MCHC RBC-ENTMCNC: 30.7 GM/DL
MCV RBC AUTO: 91.8 FL
MONOCYTES # BLD AUTO: 0.3 K/UL
MONOCYTES NFR BLD AUTO: 4.5 %
NEUTROPHILS # BLD AUTO: 5.21 K/UL
NEUTROPHILS NFR BLD AUTO: 78.9 %
PLATELET # BLD AUTO: 266 K/UL
POTASSIUM SERPL-SCNC: 3.8 MMOL/L
PROT SERPL-MCNC: 6.1 G/DL
RBC # BLD: 3.52 M/UL
RBC # FLD: 15.5 %
SODIUM SERPL-SCNC: 135 MMOL/L
SOURCE GBS: NORMAL
WBC # FLD AUTO: 6.61 K/UL

## 2023-05-16 PROCEDURE — 76816 OB US FOLLOW-UP PER FETUS: CPT

## 2023-05-16 PROCEDURE — 81003 URINALYSIS AUTO W/O SCOPE: CPT | Mod: QW

## 2023-05-16 PROCEDURE — 76820 UMBILICAL ARTERY ECHO: CPT | Mod: 59

## 2023-05-16 PROCEDURE — 0502F SUBSEQUENT PRENATAL CARE: CPT

## 2023-05-16 PROCEDURE — 76818 FETAL BIOPHYS PROFILE W/NST: CPT

## 2023-05-16 PROCEDURE — ZZZZZ: CPT

## 2023-05-18 PROBLEM — O09.523 ADVANCED MATERNAL AGE IN MULTIGRAVIDA, THIRD TRIMESTER: Status: ACTIVE | Noted: 2023-03-14

## 2023-05-18 PROBLEM — O10.919 CHRONIC HYPERTENSION AFFECTING PREGNANCY: Status: ACTIVE | Noted: 2022-11-22

## 2023-05-18 PROBLEM — O99.013 ANEMIA DURING PREGNANCY IN THIRD TRIMESTER: Status: ACTIVE | Noted: 2023-04-10

## 2023-05-22 ENCOUNTER — APPOINTMENT (OUTPATIENT)
Dept: INFUSION THERAPY | Facility: CLINIC | Age: 38
End: 2023-05-22

## 2023-05-22 ENCOUNTER — OUTPATIENT (OUTPATIENT)
Dept: OUTPATIENT SERVICES | Facility: HOSPITAL | Age: 38
LOS: 1 days | End: 2023-05-22

## 2023-05-22 DIAGNOSIS — Z01.818 ENCOUNTER FOR OTHER PREPROCEDURAL EXAMINATION: ICD-10-CM

## 2023-05-22 DIAGNOSIS — D17.1 BENIGN LIPOMATOUS NEOPLASM OF SKIN AND SUBCUTANEOUS TISSUE OF TRUNK: Chronic | ICD-10-CM

## 2023-05-22 DIAGNOSIS — Z98.891 HISTORY OF UTERINE SCAR FROM PREVIOUS SURGERY: Chronic | ICD-10-CM

## 2023-05-22 DIAGNOSIS — O34.211 MATERNAL CARE FOR LOW TRANSVERSE SCAR FROM PREVIOUS CESAREAN DELIVERY: ICD-10-CM

## 2023-05-22 LAB
BASOPHILS # BLD AUTO: 0.02 K/UL — SIGNIFICANT CHANGE UP (ref 0–0.2)
BASOPHILS NFR BLD AUTO: 0.3 % — SIGNIFICANT CHANGE UP (ref 0–2)
EOSINOPHIL # BLD AUTO: 0.13 K/UL — SIGNIFICANT CHANGE UP (ref 0–0.5)
EOSINOPHIL NFR BLD AUTO: 1.7 % — SIGNIFICANT CHANGE UP (ref 0–6)
HCT VFR BLD CALC: 30.6 % — LOW (ref 34.5–45)
HGB BLD-MCNC: 10.1 G/DL — LOW (ref 11.5–15.5)
IMM GRANULOCYTES NFR BLD AUTO: 0.9 % — SIGNIFICANT CHANGE UP (ref 0–0.9)
LYMPHOCYTES # BLD AUTO: 1.09 K/UL — SIGNIFICANT CHANGE UP (ref 1–3.3)
LYMPHOCYTES # BLD AUTO: 14.5 % — SIGNIFICANT CHANGE UP (ref 13–44)
MCHC RBC-ENTMCNC: 28.3 PG — SIGNIFICANT CHANGE UP (ref 27–34)
MCHC RBC-ENTMCNC: 33 GM/DL — SIGNIFICANT CHANGE UP (ref 32–36)
MCV RBC AUTO: 85.7 FL — SIGNIFICANT CHANGE UP (ref 80–100)
MONOCYTES # BLD AUTO: 0.44 K/UL — SIGNIFICANT CHANGE UP (ref 0–0.9)
MONOCYTES NFR BLD AUTO: 5.9 % — SIGNIFICANT CHANGE UP (ref 2–14)
NEUTROPHILS # BLD AUTO: 5.76 K/UL — SIGNIFICANT CHANGE UP (ref 1.8–7.4)
NEUTROPHILS NFR BLD AUTO: 76.7 % — SIGNIFICANT CHANGE UP (ref 43–77)
PLATELET # BLD AUTO: 222 K/UL — SIGNIFICANT CHANGE UP (ref 150–400)
RBC # BLD: 3.57 M/UL — LOW (ref 3.8–5.2)
RBC # FLD: 15.5 % — HIGH (ref 10.3–14.5)
WBC # BLD: 7.51 K/UL — SIGNIFICANT CHANGE UP (ref 3.8–10.5)
WBC # FLD AUTO: 7.51 K/UL — SIGNIFICANT CHANGE UP (ref 3.8–10.5)

## 2023-05-22 PROCEDURE — 36415 COLL VENOUS BLD VENIPUNCTURE: CPT

## 2023-05-22 PROCEDURE — 86900 BLOOD TYPING SEROLOGIC ABO: CPT

## 2023-05-22 PROCEDURE — 86850 RBC ANTIBODY SCREEN: CPT

## 2023-05-22 PROCEDURE — 86901 BLOOD TYPING SEROLOGIC RH(D): CPT

## 2023-05-22 PROCEDURE — 85025 COMPLETE CBC W/AUTO DIFF WBC: CPT

## 2023-05-23 ENCOUNTER — APPOINTMENT (OUTPATIENT)
Dept: ANTEPARTUM | Facility: CLINIC | Age: 38
End: 2023-05-23
Payer: COMMERCIAL

## 2023-05-23 ENCOUNTER — ASOB RESULT (OUTPATIENT)
Age: 38
End: 2023-05-23

## 2023-05-23 DIAGNOSIS — O34.211 MATERNAL CARE FOR LOW TRANSVERSE SCAR FROM PREVIOUS CESAREAN DELIVERY: ICD-10-CM

## 2023-05-23 DIAGNOSIS — Z01.818 ENCOUNTER FOR OTHER PREPROCEDURAL EXAMINATION: ICD-10-CM

## 2023-05-23 PROCEDURE — 76818 FETAL BIOPHYS PROFILE W/NST: CPT

## 2023-05-23 PROCEDURE — 76820 UMBILICAL ARTERY ECHO: CPT

## 2023-05-24 ENCOUNTER — INPATIENT (INPATIENT)
Facility: HOSPITAL | Age: 38
LOS: 1 days | Discharge: HOME | End: 2023-05-26
Attending: OBSTETRICS & GYNECOLOGY | Admitting: OBSTETRICS & GYNECOLOGY
Payer: COMMERCIAL

## 2023-05-24 ENCOUNTER — APPOINTMENT (OUTPATIENT)
Dept: OBGYN | Facility: HOSPITAL | Age: 38
End: 2023-05-24

## 2023-05-24 VITALS — HEIGHT: 67 IN | WEIGHT: 270.95 LBS

## 2023-05-24 DIAGNOSIS — O34.211 MATERNAL CARE FOR LOW TRANSVERSE SCAR FROM PREVIOUS CESAREAN DELIVERY: ICD-10-CM

## 2023-05-24 DIAGNOSIS — Z01.818 ENCOUNTER FOR OTHER PREPROCEDURAL EXAMINATION: ICD-10-CM

## 2023-05-24 DIAGNOSIS — Z98.891 HISTORY OF UTERINE SCAR FROM PREVIOUS SURGERY: Chronic | ICD-10-CM

## 2023-05-24 DIAGNOSIS — D17.1 BENIGN LIPOMATOUS NEOPLASM OF SKIN AND SUBCUTANEOUS TISSUE OF TRUNK: Chronic | ICD-10-CM

## 2023-05-24 LAB
ALBUMIN SERPL ELPH-MCNC: 2.3 G/DL — LOW (ref 3.3–5)
ALP SERPL-CCNC: 103 U/L — SIGNIFICANT CHANGE UP (ref 40–120)
ALT FLD-CCNC: 22 U/L — SIGNIFICANT CHANGE UP (ref 12–78)
ANION GAP SERPL CALC-SCNC: 6 MMOL/L — SIGNIFICANT CHANGE UP (ref 5–17)
APPEARANCE UR: CLEAR — SIGNIFICANT CHANGE UP
APTT BLD: 28.8 SEC — SIGNIFICANT CHANGE UP (ref 27.5–35.5)
AST SERPL-CCNC: 16 U/L — SIGNIFICANT CHANGE UP (ref 15–37)
BILIRUB SERPL-MCNC: 0.6 MG/DL — SIGNIFICANT CHANGE UP (ref 0.2–1.2)
BILIRUB UR-MCNC: NEGATIVE — SIGNIFICANT CHANGE UP
BUN SERPL-MCNC: 11 MG/DL — SIGNIFICANT CHANGE UP (ref 7–23)
CALCIUM SERPL-MCNC: 8.3 MG/DL — LOW (ref 8.5–10.1)
CHLORIDE SERPL-SCNC: 108 MMOL/L — SIGNIFICANT CHANGE UP (ref 96–108)
CO2 SERPL-SCNC: 23 MMOL/L — SIGNIFICANT CHANGE UP (ref 22–31)
COLOR SPEC: YELLOW — SIGNIFICANT CHANGE UP
CREAT ?TM UR-MCNC: 113 MG/DL — SIGNIFICANT CHANGE UP
CREAT SERPL-MCNC: 0.58 MG/DL — SIGNIFICANT CHANGE UP (ref 0.5–1.3)
DIFF PNL FLD: ABNORMAL
EGFR: 119 ML/MIN/1.73M2 — SIGNIFICANT CHANGE UP
FIBRINOGEN PPP-MCNC: 477 MG/DL — HIGH (ref 200–435)
GLUCOSE SERPL-MCNC: 86 MG/DL — SIGNIFICANT CHANGE UP (ref 70–99)
GLUCOSE UR QL: NEGATIVE — SIGNIFICANT CHANGE UP
INR BLD: 0.98 RATIO — SIGNIFICANT CHANGE UP (ref 0.88–1.16)
KETONES UR-MCNC: ABNORMAL
LDH SERPL L TO P-CCNC: 125 U/L — SIGNIFICANT CHANGE UP (ref 84–241)
LEUKOCYTE ESTERASE UR-ACNC: ABNORMAL
NITRITE UR-MCNC: NEGATIVE — SIGNIFICANT CHANGE UP
PH UR: 6.5 — SIGNIFICANT CHANGE UP (ref 5–8)
POTASSIUM SERPL-MCNC: 4.3 MMOL/L — SIGNIFICANT CHANGE UP (ref 3.5–5.3)
POTASSIUM SERPL-SCNC: 4.3 MMOL/L — SIGNIFICANT CHANGE UP (ref 3.5–5.3)
PROT ?TM UR-MCNC: 36 MG/DL — HIGH (ref 0–12)
PROT SERPL-MCNC: 6.1 GM/DL — SIGNIFICANT CHANGE UP (ref 6–8.3)
PROT UR-MCNC: NEGATIVE — SIGNIFICANT CHANGE UP
PROT/CREAT UR-RTO: 0.3 RATIO — HIGH (ref 0–0.2)
PROTHROM AB SERPL-ACNC: 11.4 SEC — SIGNIFICANT CHANGE UP (ref 10.5–13.4)
SODIUM SERPL-SCNC: 137 MMOL/L — SIGNIFICANT CHANGE UP (ref 135–145)
SP GR SPEC: 1.01 — SIGNIFICANT CHANGE UP (ref 1.01–1.02)
URATE SERPL-MCNC: 4.7 MG/DL — SIGNIFICANT CHANGE UP (ref 2.5–7)
UROBILINOGEN FLD QL: 1

## 2023-05-24 PROCEDURE — 85027 COMPLETE CBC AUTOMATED: CPT

## 2023-05-24 PROCEDURE — 85384 FIBRINOGEN ACTIVITY: CPT

## 2023-05-24 PROCEDURE — 85025 COMPLETE CBC W/AUTO DIFF WBC: CPT

## 2023-05-24 PROCEDURE — 36415 COLL VENOUS BLD VENIPUNCTURE: CPT

## 2023-05-24 PROCEDURE — 93306 TTE W/DOPPLER COMPLETE: CPT

## 2023-05-24 PROCEDURE — 94760 N-INVAS EAR/PLS OXIMETRY 1: CPT

## 2023-05-24 PROCEDURE — 81001 URINALYSIS AUTO W/SCOPE: CPT

## 2023-05-24 PROCEDURE — 84156 ASSAY OF PROTEIN URINE: CPT

## 2023-05-24 PROCEDURE — 84550 ASSAY OF BLOOD/URIC ACID: CPT

## 2023-05-24 PROCEDURE — 83880 ASSAY OF NATRIURETIC PEPTIDE: CPT

## 2023-05-24 PROCEDURE — 82570 ASSAY OF URINE CREATININE: CPT

## 2023-05-24 PROCEDURE — 93005 ELECTROCARDIOGRAM TRACING: CPT

## 2023-05-24 PROCEDURE — 59050 FETAL MONITOR W/REPORT: CPT

## 2023-05-24 PROCEDURE — 85610 PROTHROMBIN TIME: CPT

## 2023-05-24 PROCEDURE — C1889: CPT

## 2023-05-24 PROCEDURE — 83615 LACTATE (LD) (LDH) ENZYME: CPT

## 2023-05-24 PROCEDURE — 80053 COMPREHEN METABOLIC PANEL: CPT

## 2023-05-24 PROCEDURE — 85730 THROMBOPLASTIN TIME PARTIAL: CPT

## 2023-05-24 PROCEDURE — 59510 CESAREAN DELIVERY: CPT

## 2023-05-24 RX ORDER — LABETALOL HCL 100 MG
400 TABLET ORAL
Refills: 0 | Status: DISCONTINUED | OUTPATIENT
Start: 2023-05-24 | End: 2023-05-24

## 2023-05-24 RX ORDER — HYDROMORPHONE HYDROCHLORIDE 2 MG/ML
0.5 INJECTION INTRAMUSCULAR; INTRAVENOUS; SUBCUTANEOUS
Refills: 0 | Status: DISCONTINUED | OUTPATIENT
Start: 2023-05-24 | End: 2023-05-24

## 2023-05-24 RX ORDER — CITRIC ACID/SODIUM CITRATE 300-500 MG
30 SOLUTION, ORAL ORAL ONCE
Refills: 0 | Status: COMPLETED | OUTPATIENT
Start: 2023-05-24 | End: 2023-05-24

## 2023-05-24 RX ORDER — NALOXONE HYDROCHLORIDE 4 MG/.1ML
0.1 SPRAY NASAL
Refills: 0 | Status: DISCONTINUED | OUTPATIENT
Start: 2023-05-24 | End: 2023-05-26

## 2023-05-24 RX ORDER — TETANUS TOXOID, REDUCED DIPHTHERIA TOXOID AND ACELLULAR PERTUSSIS VACCINE, ADSORBED 5; 2.5; 8; 8; 2.5 [IU]/.5ML; [IU]/.5ML; UG/.5ML; UG/.5ML; UG/.5ML
0.5 SUSPENSION INTRAMUSCULAR ONCE
Refills: 0 | Status: DISCONTINUED | OUTPATIENT
Start: 2023-05-24 | End: 2023-05-26

## 2023-05-24 RX ORDER — SODIUM CHLORIDE 9 MG/ML
1000 INJECTION, SOLUTION INTRAVENOUS ONCE
Refills: 0 | Status: COMPLETED | OUTPATIENT
Start: 2023-05-24 | End: 2023-05-24

## 2023-05-24 RX ORDER — MORPHINE SULFATE 50 MG/1
0.1 CAPSULE, EXTENDED RELEASE ORAL ONCE
Refills: 0 | Status: DISCONTINUED | OUTPATIENT
Start: 2023-05-24 | End: 2023-05-26

## 2023-05-24 RX ORDER — ACETAMINOPHEN 500 MG
975 TABLET ORAL
Refills: 0 | Status: DISCONTINUED | OUTPATIENT
Start: 2023-05-24 | End: 2023-05-26

## 2023-05-24 RX ORDER — KETOROLAC TROMETHAMINE 30 MG/ML
30 SYRINGE (ML) INJECTION EVERY 6 HOURS
Refills: 0 | Status: DISCONTINUED | OUTPATIENT
Start: 2023-05-24 | End: 2023-05-25

## 2023-05-24 RX ORDER — OXYCODONE HYDROCHLORIDE 5 MG/1
5 TABLET ORAL ONCE
Refills: 0 | Status: DISCONTINUED | OUTPATIENT
Start: 2023-05-24 | End: 2023-05-26

## 2023-05-24 RX ORDER — OXYTOCIN 10 UNIT/ML
333.33 VIAL (ML) INJECTION
Qty: 20 | Refills: 0 | Status: DISCONTINUED | OUTPATIENT
Start: 2023-05-24 | End: 2023-05-26

## 2023-05-24 RX ORDER — FAMOTIDINE 10 MG/ML
20 INJECTION INTRAVENOUS ONCE
Refills: 0 | Status: COMPLETED | OUTPATIENT
Start: 2023-05-24 | End: 2023-05-24

## 2023-05-24 RX ORDER — LABETALOL HCL 100 MG
400 TABLET ORAL
Refills: 0 | Status: DISCONTINUED | OUTPATIENT
Start: 2023-05-24 | End: 2023-05-26

## 2023-05-24 RX ORDER — SODIUM CHLORIDE 9 MG/ML
1000 INJECTION, SOLUTION INTRAVENOUS
Refills: 0 | Status: DISCONTINUED | OUTPATIENT
Start: 2023-05-24 | End: 2023-05-26

## 2023-05-24 RX ORDER — DIPHENHYDRAMINE HCL 50 MG
25 CAPSULE ORAL EVERY 6 HOURS
Refills: 0 | Status: DISCONTINUED | OUTPATIENT
Start: 2023-05-24 | End: 2023-05-26

## 2023-05-24 RX ORDER — OXYCODONE HYDROCHLORIDE 5 MG/1
5 TABLET ORAL
Refills: 0 | Status: DISCONTINUED | OUTPATIENT
Start: 2023-05-24 | End: 2023-05-26

## 2023-05-24 RX ORDER — MAGNESIUM HYDROXIDE 400 MG/1
30 TABLET, CHEWABLE ORAL
Refills: 0 | Status: DISCONTINUED | OUTPATIENT
Start: 2023-05-24 | End: 2023-05-26

## 2023-05-24 RX ORDER — SODIUM CHLORIDE 9 MG/ML
1000 INJECTION, SOLUTION INTRAVENOUS
Refills: 0 | Status: DISCONTINUED | OUTPATIENT
Start: 2023-05-24 | End: 2023-05-24

## 2023-05-24 RX ORDER — IBUPROFEN 200 MG
600 TABLET ORAL EVERY 6 HOURS
Refills: 0 | Status: COMPLETED | OUTPATIENT
Start: 2023-05-24 | End: 2024-04-21

## 2023-05-24 RX ORDER — ACETAMINOPHEN 500 MG
1000 TABLET ORAL ONCE
Refills: 0 | Status: COMPLETED | OUTPATIENT
Start: 2023-05-24 | End: 2023-05-24

## 2023-05-24 RX ORDER — LANOLIN
1 OINTMENT (GRAM) TOPICAL EVERY 6 HOURS
Refills: 0 | Status: DISCONTINUED | OUTPATIENT
Start: 2023-05-24 | End: 2023-05-26

## 2023-05-24 RX ORDER — ONDANSETRON 8 MG/1
4 TABLET, FILM COATED ORAL EVERY 6 HOURS
Refills: 0 | Status: DISCONTINUED | OUTPATIENT
Start: 2023-05-24 | End: 2023-05-26

## 2023-05-24 RX ORDER — SIMETHICONE 80 MG/1
80 TABLET, CHEWABLE ORAL EVERY 4 HOURS
Refills: 0 | Status: DISCONTINUED | OUTPATIENT
Start: 2023-05-24 | End: 2023-05-26

## 2023-05-24 RX ORDER — CEFAZOLIN SODIUM 1 G
3000 VIAL (EA) INJECTION ONCE
Refills: 0 | Status: COMPLETED | OUTPATIENT
Start: 2023-05-24 | End: 2023-05-24

## 2023-05-24 RX ADMIN — Medication 200 MILLIGRAM(S): at 10:50

## 2023-05-24 RX ADMIN — Medication 400 MILLIGRAM(S): at 22:27

## 2023-05-24 RX ADMIN — Medication 400 MILLIGRAM(S): at 12:57

## 2023-05-24 RX ADMIN — ONDANSETRON 4 MILLIGRAM(S): 8 TABLET, FILM COATED ORAL at 19:57

## 2023-05-24 RX ADMIN — FAMOTIDINE 20 MILLIGRAM(S): 10 INJECTION INTRAVENOUS at 10:24

## 2023-05-24 RX ADMIN — Medication 30 MILLILITER(S): at 10:24

## 2023-05-24 RX ADMIN — Medication 1000 MILLIUNIT(S)/MIN: at 12:56

## 2023-05-24 RX ADMIN — SODIUM CHLORIDE 2000 MILLILITER(S): 9 INJECTION, SOLUTION INTRAVENOUS at 08:58

## 2023-05-24 RX ADMIN — Medication 30 MILLIGRAM(S): at 21:38

## 2023-05-24 RX ADMIN — Medication 30 MILLIGRAM(S): at 22:08

## 2023-05-24 RX ADMIN — Medication 30 MILLIGRAM(S): at 13:47

## 2023-05-24 NOTE — PATIENT PROFILE OB - RUPTURE OF MEMBRANES_DATE TIME
The patient is a 78  y.o.  female with hx dementia and a fall at her assisted living on 4/9/22.  She was brought to the ED by ambulance and xray showed impacted right femoral neck fracture.  The appropriate medical clearances were obtained and she was admitted to [facility].  After receiving pre-operative parenteral prophylactic antibiotics (ancef), the patient  underwent an  uncomplicated ORIF of right femoral neck fracture w/ gamma nail by orthopedic surgeon Dr. Alejandro Barajas.    A medical consultation from the Hospitalist service was obtained for post-operative medical co-management. Typical Physical & occupational therapy modalities post ORIF of hip were performed including ambulation training, range of motion, ADL's, and transfers. Lovenox 40 mg every 24 hrs, along w/ bilat venodynes was given for DVT prophylaxis; This was then switched to heparin SQ, then stopped due to significant drop in H/H to 6.2/19.  This required transfusion of 1 unit PRBCs.  Infectious disease was consulted due to hypothermia,; cultures obtained and were negative.  Nephrology was consulted due to CRISTIANO, poss due to post op anemia.  Pulmonary consulted due to hypoxemia. Neurology was consulted due to fall and dementia.  The patient had a clean appearing surgical incision with no sign of surgical site infections and had a stable neuro / vascular exam of the operated extremity.  After progression of mobility guided by the PT/ OT staff,  the patient was felt to benefit from further rehabilitative care for restoration to level of function. This was felt to best be accomplished at  rehab facility.  Discharge and Orthopedic Care instructions were delineated in the Discharge Plan and reviewed with the patient. All medications were delineated in the medication reconciliation tool and key points were reviewed with the patient. They were deemed stable from an Orthopedic & medical standpoint for discharge today.  Upon  discharge from the rehab facility she will be  following up with Dr. Barajas for office  follow up Orthopedic care.   The patient is a 78  y.o.  female with hx dementia and a fall at her assisted living on 4/9/22.  She was brought to the ED by ambulance and xray showed impacted right femoral neck fracture.  The appropriate medical clearances were obtained and she was admitted to [facility].  On 4/11/22, after receiving pre-operative parenteral prophylactic antibiotics (ancef), the patient  underwent an  uncomplicated ORIF of right femoral neck fracture w/ gamma nail by orthopedic surgeon Dr. Alejandro Barajas.    A medical consultation from the Hospitalist service was obtained for post-operative medical co-management. Typical Physical & occupational therapy modalities post ORIF of hip were performed including ambulation training, range of motion, ADL's, and transfers. Lovenox 40 mg every 24 hrs, along w/ bilat venodynes was given for DVT prophylaxis; This was then switched to heparin SQ, then stopped due to significant drop in H/H to 6.2/19.  This required transfusion of 1 unit PRBCs. On POD 3 she received  a 2nd unit PRBCs for continued low h/h.  A CT scan was done which showed no retroperitoneal bleeding, but did show intramuscular gluteus medius and lateral subcutaneous hematomas. Infectious disease was consulted due to hypothermia,; cultures obtained and were negative.  Nephrology was consulted due to CRISTIANO, poss due to post op anemia.  Pulmonary consulted due to hypoxemia. Neurology was consulted due to fall and dementia.  The patient had a clean appearing surgical incision with no sign of surgical site infections and had a stable neuro / vascular exam of the operated extremity.  After progression of mobility guided by the PT/ OT staff,  the patient was felt to benefit from further rehabilitative care for restoration to level of function. This was felt to best be accomplished at  rehab facility.  Discharge and Orthopedic Care instructions were delineated in the Discharge Plan and reviewed with the patient. All medications were delineated in the medication reconciliation tool and key points were reviewed with the patient. They were deemed stable from an Orthopedic & medical standpoint for discharge today.  Upon  discharge from the rehab facility she will be  following up with Dr. Barajas for office  follow up Orthopedic care.   The patient is a 78  y.o.  female with hx dementia and a fall at her assisted living on 4/9/22.  She was brought to the ED by ambulance and xray showed impacted right femoral neck fracture.  The appropriate medical clearances were obtained and she was admitted to [facility].  On 4/11/22, after receiving pre-operative parenteral prophylactic antibiotics (ancef), the patient  underwent an  uncomplicated ORIF of right femoral neck fracture w/ gamma nail by orthopedic surgeon Dr. Alejandro Barajas.    A medical consultation from the Hospitalist service was obtained for post-operative medical co-management. Typical Physical & occupational therapy modalities post ORIF of hip were performed including ambulation training, range of motion, ADL's, and transfers. Lovenox 40 mg every 24 hrs, along w/ bilat venodynes was given for DVT prophylaxis; This was then switched to heparin SQ, then stopped due to significant drop in H/H to 6.2/19.  This required transfusion of 1 unit PRBCs. On POD 3 she received  a 2nd unit PRBCs for continued low h/h.  A CT scan was done which showed no retroperitoneal bleeding, but did show intramuscular gluteus medius and lateral subcutaneous hematomas. Infectious disease was consulted due to hypothermia,; cultures obtained and were negative.  Nephrology was consulted due to CRISTIANO, poss due to post op anemia.  Pulmonary consulted due to hypoxemia. Neurology was consulted due to fall and dementia.  The patient had a clean appearing surgical incision with no sign of surgical site infections and had a stable neuro / vascular exam of the operated extremity.  After progression of mobility guided by the PT/ OT staff,  the patient was felt to benefit from further rehabilitative care for restoration to level of function. This was felt to best be accomplished at  rehab facility.  Discharge and Orthopedic Care instructions were delineated in the Discharge Plan and reviewed with the patient. All medications were delineated in the medication reconciliation tool and key points were reviewed with the patient. They were deemed stable from an Orthopedic & medical standpoint for discharge today.  Upon  discharge from the rehab facility she will be  following up with Dr. Barajas for office  follow up Orthopedic care.    A/P:    78F with dementia, DJD, HTN/HLD, carotid stenosis, hypothyroidism who presents from [facility] after a fall.  Found to have right femoral neck fracture.      Right femoral neck fracture  - pain control as needed  - POD# 6  - PT/ OT eval appreciated, plan for eventual FATOUMATA placement    - Leukocytosis resolved, likely reactive  - S/p 1 unit PRBC 4/13  - CT scan reviewed  - restarted heparin BID on 4/15    Anemia  likely from blood loss.   s/p 1 unit PRBC 4/13  restarted heparin as she is high risk for DVT 4/15  today's Hb 8.9 from 7.4 s/p 1 unit PRBC 4/17.  No drainage from incision site, dry padding.   will have ecchymosis on skin 2/2 heparin use.     CRISTIANO  - Likely pre-renal  - US kidney and bladder, no hydronephrosis    - Avoid nephrotoxic meds   - Nephrology consult appreciated   improved    Desats  - occurred on admission  - Pulm input appreciated, D-dimer elevated but CTA neg for PE  - US LLE neg for DVT  - Titrate off supplemental O2 as tolerated    Hypothermia and bradycardia  - Improved  - Likely in setting of hypothyroidism   - Surveillance blood and urine cultures NGTD  - ID, cardio and endocrine input appreciated    Hypothyroidism  - cont with synthroid  - Endocrine input appreciated   - Repeat TSH outpatient in 4-6 weeks    HTN/HLD  - Continue amlodipine 5 mg daily  - Resume losartan once BP improves   - Continue atorvastatin 20 mg daily  - Restarted ASA    Dementia  - Namenda 10mg BID  - mirtazapine  - Seen by psych and neuro, acute aggression and agitation in setting of hospital acquired delerium    Incidental findings on CT  Cholelithiasis. The 1.4 x 1.6 cm right adrenal nodule and a 2.7 x 2.0 cm left adrenal nodule that likely represent adenomas, are   unchanged. Compression deformities of the T4-T6, T8-T9, and L1-L2 vertebral bodies.   Follow up outpatient for further management    The patient is a 78  y.o.  female with hx dementia and a fall at her assisted living on 4/9/22.  She was brought to the ED by ambulance and xray showed impacted right femoral neck fracture.  The appropriate medical clearances were obtained and she was admitted to [facility].  On 4/11/22, after receiving pre-operative parenteral prophylactic antibiotics (ancef), the patient  underwent an  uncomplicated ORIF of right femoral neck fracture w/ gamma nail by orthopedic surgeon Dr. Alejandro Barajas.    A medical consultation from the Hospitalist service was obtained for post-operative medical co-management. Typical Physical & occupational therapy modalities post ORIF of hip were performed including ambulation training, range of motion, ADL's, and transfers. Lovenox 40 mg every 24 hrs, along w/ bilat venodynes was given for DVT prophylaxis; This was then switched to heparin SQ, then stopped due to significant drop in H/H to 6.2/19.  This required transfusion of 1 unit PRBCs. On POD 3 she received  a 2nd unit PRBCs for continued low h/h.  A CT scan was done which showed no retroperitoneal bleeding, but did show intramuscular gluteus medius and lateral subcutaneous hematomas. Infectious disease was consulted due to hypothermia,; cultures obtained and were negative.  Nephrology was consulted due to CRISTIANO, poss due to post op anemia.  Pulmonary consulted due to hypoxemia. Neurology was consulted due to fall and dementia.  The patient had a clean appearing surgical incision with no sign of surgical site infections and had a stable neuro / vascular exam of the operated extremity.  After progression of mobility guided by the PT/ OT staff,  the patient was felt to benefit from further rehabilitative care for restoration to level of function. This was felt to best be accomplished at  rehab facility.  Discharge and Orthopedic Care instructions were delineated in the Discharge Plan and reviewed with the patient. All medications were delineated in the medication reconciliation tool and key points were reviewed with the patient. They were deemed stable from an Orthopedic & medical standpoint for discharge today.  Upon  discharge from the rehab facility she will be  following up with Dr. Barajas for office  follow up Orthopedic care.    A/P:    78F with dementia, DJD, HTN/HLD, carotid stenosis, hypothyroidism who presents from [facility] after a fall.  Found to have right femoral neck fracture.      Right femoral neck fracture  - pain control as needed  - POD# 6  - PT/ OT eval appreciated, plan for eventual FATOUMATA placement    - Leukocytosis resolved, likely reactive  - S/p 1 unit PRBC 4/13  - CT scan reviewed  - restarted heparin BID on 4/15    Anemia  likely from blood loss.   s/p 1 unit PRBC 4/13  restarted heparin as she is high risk for DVT 4/15  today's Hb 8.9 from 7.4 s/p 1 unit PRBC 4/17.  No drainage from incision site, dry padding.   will have ecchymosis on skin 2/2 heparin use.   please obtain repeat CBC when she gets to Rehab to monitor anemia.  Monitor as needed after that.     CRISTIANO  - Likely pre-renal  - US kidney and bladder, no hydronephrosis    - Avoid nephrotoxic meds   - Nephrology consult appreciated   improved    Desats  - occurred on admission  - Pulm input appreciated, D-dimer elevated but CTA neg for PE  - US LLE neg for DVT  - Titrate off supplemental O2 as tolerated    Hypothermia and bradycardia  - Improved  - Likely in setting of hypothyroidism   - Surveillance blood and urine cultures NGTD  - ID, cardio and endocrine input appreciated    Hypothyroidism  - cont with synthroid  - Endocrine input appreciated   - Repeat TSH outpatient in 4-6 weeks    HTN/HLD  - Continue amlodipine 5 mg daily  - Resume losartan once BP improves   - Continue atorvastatin 20 mg daily  - Restarted ASA    Dementia  - Namenda 10mg BID  - mirtazapine  - Seen by psych and neuro, acute aggression and agitation in setting of hospital acquired delerium    Incidental findings on CT  Cholelithiasis. The 1.4 x 1.6 cm right adrenal nodule and a 2.7 x 2.0 cm left adrenal nodule that likely represent adenomas, are   unchanged. Compression deformities of the T4-T6, T8-T9, and L1-L2 vertebral bodies.   Follow up outpatient for further management     Updated daughter, agrees to plan The patient is a 78  y.o.  female with hx dementia and a fall at her assisted living on 4/9/22.  She was brought to the ED by ambulance and xray showed impacted right femoral neck fracture.  The appropriate medical clearances were obtained and she was admitted to [facility].  On 4/11/22, after receiving pre-operative parenteral prophylactic antibiotics (ancef), the patient  underwent an  uncomplicated ORIF of right femoral neck fracture w/ gamma nail by orthopedic surgeon Dr. Alejandro Barajas.    A medical consultation from the Hospitalist service was obtained for post-operative medical co-management. Typical Physical & occupational therapy modalities post ORIF of hip were performed including ambulation training, range of motion, ADL's, and transfers. Lovenox 40 mg every 24 hrs, along w/ bilat venodynes was given for DVT prophylaxis; This was then switched to heparin SQ, then stopped due to significant drop in H/H to 6.2/19.  This required transfusion of 1 unit PRBCs. On POD 3 she received  a 2nd unit PRBCs for continued low h/h.  A CT scan was done which showed no retroperitoneal bleeding, but did show intramuscular gluteus medius and lateral subcutaneous hematomas. Infectious disease was consulted due to hypothermia,; cultures obtained and were negative.  Nephrology was consulted due to CRISTIANO, poss due to post op anemia.  Pulmonary consulted due to hypoxemia. Neurology was consulted due to fall and dementia.  The patient had a clean appearing surgical incision with no sign of surgical site infections and had a stable neuro / vascular exam of the operated extremity.  After progression of mobility guided by the PT/ OT staff,  the patient was felt to benefit from further rehabilitative care for restoration to level of function. This was felt to best be accomplished at  rehab facility.  Discharge and Orthopedic Care instructions were delineated in the Discharge Plan and reviewed with the patient. All medications were delineated in the medication reconciliation tool and key points were reviewed with the patient. They were deemed stable from an Orthopedic & medical standpoint for discharge today.  Upon  discharge from the rehab facility she will be  following up with Dr. Barajas for office  follow up Orthopedic care.    A/P:    78F with dementia, DJD, HTN/HLD, carotid stenosis, hypothyroidism who presents from [facility] after a fall.  Found to have right femoral neck fracture.      Right femoral neck fracture  - pain control as needed  - PT/ OT eval appreciated, plan for eventual FATOUMATA placement    - Leukocytosis resolved, likely reactive  - S/p 1 unit PRBC 4/13  - CT scan reviewed  - restarted heparin BID on 4/15  - HB has remained stable    Anemia  likely from blood loss.   s/p 1 unit PRBC 4/13  restarted heparin as she is high risk for DVT 4/15  today's Hb 8.9 from 7.4 s/p 1 unit PRBC 4/17.  No drainage from incision site, dry padding.   will have ecchymosis on skin 2/2 heparin use.   Hb has remained stable on day of dc    CRISTIANO  - Likely pre-renal  - US kidney and bladder, no hydronephrosis    - Avoid nephrotoxic meds   - Nephrology consult appreciated   improved    Desats  - occurred on admission  - Pulm input appreciated, D-dimer elevated but CTA neg for PE  - US LLE neg for DVT  - Titrate off supplemental O2 as tolerated    Hypothermia and bradycardia  - Improved  - Likely in setting of hypothyroidism   - Surveillance blood and urine cultures NGTD  - ID, cardio and endocrine input appreciated    Hypothyroidism  - cont with synthroid  - Endocrine input appreciated   - Repeat TSH outpatient in 4-6 weeks    HTN/HLD  - Continue amlodipine 5 mg daily  - Resume losartan once BP improves   - Continue atorvastatin 20 mg daily  - Restarted ASA    Dementia  - Namenda 10mg BID  - mirtazapine  - Seen by psych and neuro, acute aggression and agitation in setting of hospital acquired delerium    Incidental findings on CT  Cholelithiasis. The 1.4 x 1.6 cm right adrenal nodule and a 2.7 x 2.0 cm left adrenal nodule that likely represent adenomas, are   unchanged. Compression deformities of the T4-T6, T8-T9, and L1-L2 vertebral bodies.   Follow up outpatient for further management     Updated daughter, agrees to plan 24-May-2023 11:23

## 2023-05-24 NOTE — PATIENT PROFILE OB - NSPRENATALGBS_OBGYN_ALL_OB_GET_DAYS
External Cooling Fan Speed: 0 Number Of Passes: 3 Depth: medium Fluence: 20 Treated Area: large area Total Pulses: 9 Consent: Written consent obtained, risks reviewed including but not limited to crusting, scabbing, blistering, scarring, darker or lighter pigmentary change, bruising, and/or incomplete response. Fluence Units: J/cm2 Detail Level: Zone Procedure Text: The patient's skin was cleaned and the procedure was performed using the parameters noted above. Skin Type (Optional): III Pulse Duration (In Milliseconds): 3.5 Price (Use Numbers Only, No Special Characters Or $): 300.00 Pulse Delay (In Milliseconds): 30 Post-Care Instructions: I reviewed with the patient in detail post-care instructions. Patient should stay away from the sun and sunscreen. Client will do chest and cheeks Treatment Number: 2 [id number]

## 2023-05-25 ENCOUNTER — TRANSCRIPTION ENCOUNTER (OUTPATIENT)
Age: 38
End: 2023-05-25

## 2023-05-25 LAB
ADD ON TEST-SPECIMEN IN LAB: SIGNIFICANT CHANGE UP
ALBUMIN SERPL ELPH-MCNC: 1.6 G/DL — LOW (ref 3.3–5)
ALP SERPL-CCNC: 77 U/L — SIGNIFICANT CHANGE UP (ref 40–120)
ALT FLD-CCNC: 20 U/L — SIGNIFICANT CHANGE UP (ref 12–78)
ANION GAP SERPL CALC-SCNC: 10 MMOL/L — SIGNIFICANT CHANGE UP (ref 5–17)
AST SERPL-CCNC: 17 U/L — SIGNIFICANT CHANGE UP (ref 15–37)
BASOPHILS # BLD AUTO: 0.02 K/UL — SIGNIFICANT CHANGE UP (ref 0–0.2)
BASOPHILS NFR BLD AUTO: 0.3 % — SIGNIFICANT CHANGE UP (ref 0–2)
BILIRUB SERPL-MCNC: 1 MG/DL — SIGNIFICANT CHANGE UP (ref 0.2–1.2)
BUN SERPL-MCNC: 11 MG/DL — SIGNIFICANT CHANGE UP (ref 7–23)
CALCIUM SERPL-MCNC: 7.9 MG/DL — LOW (ref 8.5–10.1)
CHLORIDE SERPL-SCNC: 107 MMOL/L — SIGNIFICANT CHANGE UP (ref 96–108)
CO2 SERPL-SCNC: 19 MMOL/L — LOW (ref 22–31)
CREAT SERPL-MCNC: 0.45 MG/DL — LOW (ref 0.5–1.3)
EGFR: 126 ML/MIN/1.73M2 — SIGNIFICANT CHANGE UP
EOSINOPHIL # BLD AUTO: 0.11 K/UL — SIGNIFICANT CHANGE UP (ref 0–0.5)
EOSINOPHIL NFR BLD AUTO: 1.7 % — SIGNIFICANT CHANGE UP (ref 0–6)
GLUCOSE SERPL-MCNC: 77 MG/DL — SIGNIFICANT CHANGE UP (ref 70–99)
HCT VFR BLD CALC: 24.1 % — LOW (ref 34.5–45)
HCT VFR BLD CALC: 27.7 % — LOW (ref 34.5–45)
HGB BLD-MCNC: 7.9 G/DL — LOW (ref 11.5–15.5)
HGB BLD-MCNC: 9.1 G/DL — LOW (ref 11.5–15.5)
IMM GRANULOCYTES NFR BLD AUTO: 0.5 % — SIGNIFICANT CHANGE UP (ref 0–0.9)
LYMPHOCYTES # BLD AUTO: 0.71 K/UL — LOW (ref 1–3.3)
LYMPHOCYTES # BLD AUTO: 10.9 % — LOW (ref 13–44)
MCHC RBC-ENTMCNC: 28.4 PG — SIGNIFICANT CHANGE UP (ref 27–34)
MCHC RBC-ENTMCNC: 28.5 PG — SIGNIFICANT CHANGE UP (ref 27–34)
MCHC RBC-ENTMCNC: 32.8 GM/DL — SIGNIFICANT CHANGE UP (ref 32–36)
MCHC RBC-ENTMCNC: 32.9 GM/DL — SIGNIFICANT CHANGE UP (ref 32–36)
MCV RBC AUTO: 86.7 FL — SIGNIFICANT CHANGE UP (ref 80–100)
MCV RBC AUTO: 86.8 FL — SIGNIFICANT CHANGE UP (ref 80–100)
MONOCYTES # BLD AUTO: 0.45 K/UL — SIGNIFICANT CHANGE UP (ref 0–0.9)
MONOCYTES NFR BLD AUTO: 6.9 % — SIGNIFICANT CHANGE UP (ref 2–14)
NEUTROPHILS # BLD AUTO: 5.2 K/UL — SIGNIFICANT CHANGE UP (ref 1.8–7.4)
NEUTROPHILS NFR BLD AUTO: 79.7 % — HIGH (ref 43–77)
NT-PROBNP SERPL-SCNC: 135 PG/ML — HIGH (ref 0–125)
PLATELET # BLD AUTO: 151 K/UL — SIGNIFICANT CHANGE UP (ref 150–400)
PLATELET # BLD AUTO: 204 K/UL — SIGNIFICANT CHANGE UP (ref 150–400)
POTASSIUM SERPL-MCNC: 4.3 MMOL/L — SIGNIFICANT CHANGE UP (ref 3.5–5.3)
POTASSIUM SERPL-SCNC: 4.3 MMOL/L — SIGNIFICANT CHANGE UP (ref 3.5–5.3)
PROT SERPL-MCNC: 4.5 GM/DL — LOW (ref 6–8.3)
RBC # BLD: 2.78 M/UL — LOW (ref 3.8–5.2)
RBC # BLD: 3.19 M/UL — LOW (ref 3.8–5.2)
RBC # FLD: 15.5 % — HIGH (ref 10.3–14.5)
RBC # FLD: 15.6 % — HIGH (ref 10.3–14.5)
SODIUM SERPL-SCNC: 136 MMOL/L — SIGNIFICANT CHANGE UP (ref 135–145)
WBC # BLD: 6.52 K/UL — SIGNIFICANT CHANGE UP (ref 3.8–10.5)
WBC # BLD: 7.54 K/UL — SIGNIFICANT CHANGE UP (ref 3.8–10.5)
WBC # FLD AUTO: 6.52 K/UL — SIGNIFICANT CHANGE UP (ref 3.8–10.5)
WBC # FLD AUTO: 7.54 K/UL — SIGNIFICANT CHANGE UP (ref 3.8–10.5)

## 2023-05-25 PROCEDURE — 99231 SBSQ HOSP IP/OBS SF/LOW 25: CPT

## 2023-05-25 PROCEDURE — 93306 TTE W/DOPPLER COMPLETE: CPT | Mod: 26

## 2023-05-25 PROCEDURE — 93010 ELECTROCARDIOGRAM REPORT: CPT

## 2023-05-25 RX ORDER — POLYETHYLENE GLYCOL 3350 17 G/17G
17 POWDER, FOR SOLUTION ORAL
Qty: 1 | Refills: 0
Start: 2023-05-25 | End: 2023-06-23

## 2023-05-25 RX ORDER — ACETAMINOPHEN 500 MG
2 TABLET ORAL
Qty: 40 | Refills: 0
Start: 2023-05-25 | End: 2023-05-29

## 2023-05-25 RX ORDER — IBUPROFEN 200 MG
1 TABLET ORAL
Qty: 20 | Refills: 0
Start: 2023-05-25 | End: 2023-05-29

## 2023-05-25 RX ORDER — IBUPROFEN 200 MG
600 TABLET ORAL EVERY 6 HOURS
Refills: 0 | Status: DISCONTINUED | OUTPATIENT
Start: 2023-05-25 | End: 2023-05-26

## 2023-05-25 RX ORDER — FERROUS SULFATE 325(65) MG
325 TABLET ORAL
Refills: 0 | Status: DISCONTINUED | OUTPATIENT
Start: 2023-05-25 | End: 2023-05-26

## 2023-05-25 RX ORDER — FERROUS SULFATE 325(65) MG
1 TABLET ORAL
Qty: 30 | Refills: 0
Start: 2023-05-25 | End: 2023-06-23

## 2023-05-25 RX ORDER — LABETALOL HCL 100 MG
2 TABLET ORAL
Qty: 120 | Refills: 0
Start: 2023-05-25 | End: 2023-06-23

## 2023-05-25 RX ORDER — ASCORBIC ACID 60 MG
500 TABLET,CHEWABLE ORAL DAILY
Refills: 0 | Status: DISCONTINUED | OUTPATIENT
Start: 2023-05-25 | End: 2023-05-26

## 2023-05-25 RX ADMIN — Medication 975 MILLIGRAM(S): at 15:13

## 2023-05-25 RX ADMIN — Medication 325 MILLIGRAM(S): at 21:54

## 2023-05-25 RX ADMIN — Medication 975 MILLIGRAM(S): at 00:53

## 2023-05-25 RX ADMIN — Medication 975 MILLIGRAM(S): at 16:10

## 2023-05-25 RX ADMIN — Medication 400 MILLIGRAM(S): at 21:54

## 2023-05-25 RX ADMIN — Medication 975 MILLIGRAM(S): at 06:44

## 2023-05-25 RX ADMIN — Medication 975 MILLIGRAM(S): at 21:54

## 2023-05-25 RX ADMIN — Medication 30 MILLIGRAM(S): at 04:10

## 2023-05-25 RX ADMIN — Medication 975 MILLIGRAM(S): at 07:10

## 2023-05-25 RX ADMIN — Medication 500 MILLIGRAM(S): at 12:50

## 2023-05-25 RX ADMIN — Medication 975 MILLIGRAM(S): at 22:25

## 2023-05-25 RX ADMIN — Medication 400 MILLIGRAM(S): at 10:31

## 2023-05-25 RX ADMIN — Medication 600 MILLIGRAM(S): at 18:28

## 2023-05-25 RX ADMIN — Medication 30 MILLIGRAM(S): at 12:50

## 2023-05-25 RX ADMIN — Medication 975 MILLIGRAM(S): at 01:22

## 2023-05-25 RX ADMIN — Medication 30 MILLIGRAM(S): at 03:40

## 2023-05-25 RX ADMIN — Medication 325 MILLIGRAM(S): at 12:49

## 2023-05-25 RX ADMIN — Medication 30 MILLIGRAM(S): at 13:10

## 2023-05-25 NOTE — CONSULT NOTE ADULT - SUBJECTIVE AND OBJECTIVE BOX
CHIEF COMPLAINT: Patient is a 37y old  Female who presents with a chief complaint of delivery (25 May 2023 10:58)    HPI: 36 y/o female w/ PMHx of hypertension peripartum cardiomyopathy with complete resolution her ejection fraction returned to normal above 55%. POD day # s/p C/S    Doing well, no sob / wheezing /cp  ECG and echo ordered    MEDICATIONS:  acetaminophen     Tablet .. 975 milliGRAM(s) Oral <User Schedule>  ascorbic acid 500 milliGRAM(s) Oral daily  diphtheria/tetanus/pertussis (acellular) Vaccine (Adacel) 0.5 milliLiter(s) IntraMuscular once  ferrous    sulfate 325 milliGRAM(s) Oral two times a day  ibuprofen  Tablet. 600 milliGRAM(s) Oral every 6 hours  ketorolac   Injectable 30 milliGRAM(s) IV Push every 6 hours  labetalol 400 milliGRAM(s) Oral two times a day  lactated ringers. 1000 milliLiter(s) (125 mL/Hr) IV Continuous <Continuous>  morphine PF Spinal 0.1 milliGRAM(s) IntraThecal. once  oxytocin Infusion 333.333 milliUNIT(s)/Min (1000 mL/Hr) IV Continuous <Continuous>  oxytocin Infusion 333.333 milliUNIT(s)/Min (1000 mL/Hr) IV Continuous <Continuous>    MEDICATIONS  (PRN):  diphenhydrAMINE 25 milliGRAM(s) Oral every 6 hours PRN Pruritus  lanolin Ointment 1 Application(s) Topical every 6 hours PRN Sore Nipples  magnesium hydroxide Suspension 30 milliLiter(s) Oral two times a day PRN Constipation  naloxone Injectable 0.1 milliGRAM(s) IV Push every 3 minutes PRN For ANY of the following changes in patient status:  A. RR LESS THAN 10 breaths per minute, B. Oxygen saturation LESS THAN 90%, C. Sedation score of 6  ondansetron Injectable 4 milliGRAM(s) IV Push every 6 hours PRN Nausea  oxyCODONE    IR 5 milliGRAM(s) Oral every 3 hours PRN Moderate to Severe Pain (4-10)  oxyCODONE    IR 5 milliGRAM(s) Oral once PRN Moderate to Severe Pain (4-10)  simethicone 80 milliGRAM(s) Chew every 4 hours PRN Gas    PHYSICAL EXAM:  T(C): 36.6 (25 May 2023 08:30), Max: 36.8 (25 May 2023 00:48)  T(F): 97.9 (25 May 2023 08:30), Max: 98.3 (25 May 2023 00:48)  HR: 75 (25 May 2023 08:30) (63 - 81)  BP: 127/67 (25 May 2023 08:30) (106/61 - 140/80)  RR: 16 (25 May 2023 08:30) (16 - 18)  SpO2: 98% (25 May 2023 08:30) (96% - 100%)    Parameters below as of 25 May 2023 08:30  Patient On (Oxygen Delivery Method): room air    Constitutional: NAD, awake and alert  HEENT: PERR, EOMI, Normal Hearing, MMM  Neck: Soft and supple, No LAD, No JVD  Respiratory: Breath sounds are clear bilaterally, No wheezing, rales or rhonchi  Cardiovascular: S1 and S2, regular rate and rhythm, no Murmurs, gallops or rubs  Gastrointestinal: Bowel Sounds present, incision c/d/i  Extremities: No peripheral edema  Vascular: 2+ peripheral pulses  Neurological: A/O x 3, no focal deficits  Musculoskeletal: 5/5 strength b/l upper and lower extremities  Skin: No rashes    =======================================    EKG pending    ========================================    LABS:                        7.9    6.52  )-----------( 151      ( 25 May 2023 08:00 )             24.1     05-25    136  |  107  |  11  ----------------------------<  77  4.3   |  19<L>  |  0.45<L>    Ca    7.9<L>      25 May 2023 08:00    TPro  4.5<L>  /  Alb  1.6<L>  /  TBili  1.0  /  DBili  x   /  AST  17  /  ALT  20  /  AlkPhos  77  05-25    PT/INR - ( 24 May 2023 13:00 )   PT: 11.4 sec;   INR: 0.98 ratio       PTT - ( 24 May 2023 13:00 )  PTT:28.8 sec        RADIOLOGY & ADDITIONAL STUDIES:    ECHO pending

## 2023-05-25 NOTE — DISCHARGE NOTE OB - PLAN OF CARE
Anemia    Anemia is a condition in which the concentration of red blood cells or hemoglobin in the blood is below normal. Hemoglobin is a substance in red blood cells that carries oxygen to the tissues of the body. Anemia results in not enough oxygen reaching these tissues which can cause symptoms such as weakness, dizziness/lightheadedness, shortness of breath, chest pain, paleness, or nausea. The cause of your anemia may or may not be determined immediately. If your hemoglobin was dangerously low, you may have received a blood transfusion. Usually reactions to transfusions occur immediately but monitor yourself for any fevers, rash, or shortness of breath.    SEEK IMMEDIATE MEDICAL CARE IF YOU HAVE ANY OF THE FOLLOWING SYMPTOMS: extreme weakness/chest pain/shortness of breath, black or bloody stools, vomiting blood, fainting, fever, or any signs of dehydration. Patient underwent a  delivery. Post-op course was uncomplicated. Pain is well controlled with PRN medication. She has no difficulty with ambulation, voiding, or PO intake. Lab values and vital signs are within normal limits prior to discharge.  1) Please take acetaminophen and ibuprofen as needed for pain as prescribed.  2) Nothing in the vagina for 6 weeks (including no sex, no tampons, and no douching).  3) Please call your doctor for a follow up incision check in 2 weeks and postpartum appointment in 4-6 weeks.  4) Please continue taking vitamins postpartum. Take iron and colace for acute blood loss anemia.  5) Please call the office sooner if you have heavy vaginal bleeding, severe abdominal pain, or fever > 100.4F.  6) You may resume regular daily activity as tolerated Due to your history of peripartum cardiomyopathy, please follow-up with your cardiologist within 1-2 weeks. If you develop any chest pain, shortness of breath, worsening lower extremity swelling, please call your cardiologist and present to [facility]. Continue taking labetalol 400 two times a day as prescribed. Measure your blood pressures once in the AM after waking up and once in the PM before bedtime. Please call your doctor for blood pressures >140/>90s, headaches, blurry vision, spots or bright lights in vision, chest pain, shortness of breath, right upper abdominal pain, worsening lower extremity swelling.    Please follow-up with your cardiologist within 1-2 weeks.

## 2023-05-25 NOTE — DISCHARGE NOTE OB - CARE PROVIDER_API CALL
Jian Davison  Obstetrics and Gynecology  [address] [place] [address]  Phone: [phone]  Fax: [phone]  Follow Up Time: 1 week

## 2023-05-25 NOTE — CHART NOTE - NSCHARTNOTEFT_GEN_A_CORE
pt sent for echocardiogram as per cardilogy due to history  results pending  pt asymptomatic.    md kinza

## 2023-05-25 NOTE — CONSULT NOTE ADULT - ASSESSMENT
38 y/o female w/ PMHx of hypertension, peripartum cardiomyopathy with complete resolution her ejection fraction returned to normal above 55%. POD day # s/p C/S  - Check echocardiogram, EKG and BNP  - Clinically appears stable, BPS stable  - Would hold further IVF  - No further cardiac testing, call with questions.  - Recommend outpatient follow up              Case d/w Dr. Trammell

## 2023-05-25 NOTE — DISCHARGE NOTE OB - CARE PLAN
1 Principal Discharge DX:	S/P  section  Assessment and plan of treatment:	Patient underwent a  delivery. Post-op course was uncomplicated. Pain is well controlled with PRN medication. She has no difficulty with ambulation, voiding, or PO intake. Lab values and vital signs are within normal limits prior to discharge.  1) Please take acetaminophen and ibuprofen as needed for pain as prescribed.  2) Nothing in the vagina for 6 weeks (including no sex, no tampons, and no douching).  3) Please call your doctor for a follow up incision check in 2 weeks and postpartum appointment in 4-6 weeks.  4) Please continue taking vitamins postpartum. Take iron and colace for acute blood loss anemia.  5) Please call the office sooner if you have heavy vaginal bleeding, severe abdominal pain, or fever > 100.4F.  6) You may resume regular daily activity as tolerated  Secondary Diagnosis:	Anemia due to acute blood loss  Assessment and plan of treatment:	Anemia    Anemia is a condition in which the concentration of red blood cells or hemoglobin in the blood is below normal. Hemoglobin is a substance in red blood cells that carries oxygen to the tissues of the body. Anemia results in not enough oxygen reaching these tissues which can cause symptoms such as weakness, dizziness/lightheadedness, shortness of breath, chest pain, paleness, or nausea. The cause of your anemia may or may not be determined immediately. If your hemoglobin was dangerously low, you may have received a blood transfusion. Usually reactions to transfusions occur immediately but monitor yourself for any fevers, rash, or shortness of breath.    SEEK IMMEDIATE MEDICAL CARE IF YOU HAVE ANY OF THE FOLLOWING SYMPTOMS: extreme weakness/chest pain/shortness of breath, black or bloody stools, vomiting blood, fainting, fever, or any signs of dehydration.  Secondary Diagnosis:	Peripartum cardiomyopathy, postpartum  Assessment and plan of treatment:	Due to your history of peripartum cardiomyopathy, please follow-up with your cardiologist within 1-2 weeks. If you develop any chest pain, shortness of breath, worsening lower extremity swelling, please call your cardiologist and present to [facility].  Secondary Diagnosis:	Chronic hypertension  Assessment and plan of treatment:	Continue taking labetalol 400 two times a day as prescribed. Measure your blood pressures once in the AM after waking up and once in the PM before bedtime. Please call your doctor for blood pressures >140/>90s, headaches, blurry vision, spots or bright lights in vision, chest pain, shortness of breath, right upper abdominal pain, worsening lower extremity swelling.    Please follow-up with your cardiologist within 1-2 weeks.

## 2023-05-25 NOTE — DISCHARGE NOTE OB - MEDICATION SUMMARY - MEDICATIONS TO TAKE
I will START or STAY ON the medications listed below when I get home from the hospital:    ibuprofen 600 mg oral tablet  -- 1 tab(s) by mouth every 6 hours  -- Indication: For Pain    acetaminophen 500 mg oral tablet  -- 2 tab(s) by mouth every 6 hours  -- Indication: For Pain    labetalol 200 mg oral tablet  -- 2 tab(s) by mouth 2 times a day  -- Indication: For Chronic hypertension    Prenatal Multivitamins with Folic Acid 1 mg oral tablet  -- 1 tab(s) by mouth once a day  -- Indication: For Healthy mom and baby    ferrous sulfate 325 mg (65 mg elemental iron) oral tablet  -- 1 tab(s) by mouth once a day  -- Indication: For Anemia    MiraLax oral powder for reconstitution  -- 17 gram(s) by mouth once a day  -- Indication: For Constipation   I will START or STAY ON the medications listed below when I get home from the hospital:    ibuprofen 600 mg oral tablet  -- 1 tab(s) by mouth every 6 hours  -- Indication: For Pain    acetaminophen 500 mg oral tablet  -- 2 tab(s) by mouth every 6 hours  -- Indication: For Pain    oxyCODONE 5 mg oral tablet  -- 1 tab(s) by mouth every 6 hours as needed for  severe pain MDD: 4  -- Indication: For Pain control    labetalol 200 mg oral tablet  -- 2 tab(s) by mouth 2 times a day  -- Indication: For Chronic hypertension    Prenatal Multivitamins with Folic Acid 1 mg oral tablet  -- 1 tab(s) by mouth once a day  -- Indication: For Healthy mom and baby    ferrous sulfate 325 mg (65 mg elemental iron) oral tablet  -- 1 tab(s) by mouth once a day  -- Indication: For Anemia    MiraLax oral powder for reconstitution  -- 17 gram(s) by mouth once a day  -- Indication: For Constipation

## 2023-05-25 NOTE — DISCHARGE NOTE OB - NS MD DC FALL RISK RISK
For information on Fall & Injury Prevention, visit: [email or web address] OR  [email or web address] OR  https://www.cdc.gov/steadi/patient.html

## 2023-05-25 NOTE — DISCHARGE NOTE OB - PATIENT PORTAL LINK FT
You can access the FollowMyHealth Patient Portal offered by [facility] by registering at the following website: [email or web address]. By joining [facility]’s FollowMyHealth portal, you will also be able to view your health information using other applications (apps) compatible with our system.

## 2023-05-26 VITALS
HEART RATE: 81 BPM | RESPIRATION RATE: 16 BRPM | OXYGEN SATURATION: 98 % | SYSTOLIC BLOOD PRESSURE: 130 MMHG | TEMPERATURE: 99 F | DIASTOLIC BLOOD PRESSURE: 79 MMHG

## 2023-05-26 RX ORDER — OXYCODONE HYDROCHLORIDE 5 MG/1
1 TABLET ORAL
Qty: 5 | Refills: 0
Start: 2023-05-26

## 2023-05-26 RX ADMIN — Medication 600 MILLIGRAM(S): at 00:40

## 2023-05-26 RX ADMIN — Medication 500 MILLIGRAM(S): at 09:40

## 2023-05-26 RX ADMIN — Medication 325 MILLIGRAM(S): at 09:40

## 2023-05-26 RX ADMIN — Medication 400 MILLIGRAM(S): at 09:54

## 2023-05-26 RX ADMIN — Medication 600 MILLIGRAM(S): at 12:05

## 2023-05-26 RX ADMIN — Medication 600 MILLIGRAM(S): at 12:06

## 2023-05-26 RX ADMIN — Medication 975 MILLIGRAM(S): at 09:40

## 2023-05-26 RX ADMIN — Medication 600 MILLIGRAM(S): at 06:24

## 2023-05-26 RX ADMIN — Medication 600 MILLIGRAM(S): at 06:55

## 2023-05-26 RX ADMIN — Medication 600 MILLIGRAM(S): at 00:10

## 2023-05-26 RX ADMIN — Medication 975 MILLIGRAM(S): at 10:40

## 2023-05-26 NOTE — PROGRESS NOTE ADULT - ASSESSMENT
38 y/o female w/ PMHx of hypertension, peripartum cardiomyopathy with complete resolution her ejection fraction returned to normal above 55%. POD day # s/p C/S  - 5/25/23: TTE: Mod MR, mild TR/pulm HTN. EF 55%M. . EKG NSR.  - Clinically appears stable, BPS stable  - No further cardiac testing, call with questions.  - Recommend outpatient follow up              Case d/w Dr. Rainey.  Will sign off, call with questions.

## 2023-05-26 NOTE — PROGRESS NOTE ADULT - SUBJECTIVE AND OBJECTIVE BOX
LUCAS LOAIZA is a 38yo now POD#2 s/pcesarean section.  Patient feeling well and desires discharge today    S:    No acute events overnight.   The patient has no complaints.  Pain controlled with current treatment regimen.   She is ambulating without difficulty and tolerating PO.   She endorses appropriate lochia, which is decreasing.   She is breastfeeding and providing formula to baby.  She denies fevers, chills, nausea and vomiting.   She denies lightheadedness, dizziness, palpitations, chest pain and SOB.     O:    T(C): 37.2 (05-26-23 @ 08:10), Max: 37.2 (05-26-23 @ 08:10)  HR: 81 (05-26-23 @ 08:10) (78 - 83)  BP: 130/79 (05-26-23 @ 08:10) (109/62 - 133/73)  RR: 16 (05-26-23 @ 08:10) (16 - 18)  SpO2: 98% (05-26-23 @ 08:10) (97% - 98%)    Gen: NAD, AOx3  Resp: breathing comfortably on RA  Abdomen:  Soft, appropriately tender  Incision: Clean/dry/intact with steris in place. pressure dressing removed.  Uterus:  Fundus firm below umbilicus  VE:  Lochia as expected                          9.1    7.54  )-----------( 204      ( 25 May 2023 15:17 )             27.7     05-25    136  |  107  |  11  ----------------------------<  77  4.3   |  19<L>  |  0.45<L>    Ca    7.9<L>      25 May 2023 08:00    TPro  4.5<L>  /  Alb  1.6<L>  /  TBili  1.0  /  DBili  x   /  AST  17  /  ALT  20  /  AlkPhos  77  05-25 05-25-23 @ 07:01  -  05-26-23 @ 07:00  --------------------------------------------------------  IN: 0 mL / OUT: 1000 mL / NET: -1000 mL        A/P:    Routine Postoperative and Postpartum care  -Voiding, tolerating PO  -Advance care as tolerated   -VTE ppx: encourage ambulation, SCDs while in bed, daily lovenox  - No si/sx of Postpartum depression  -Continue routine postpartum and postoperative care and education    Incision: has dressing intact with vac, to be removed in the office. instructions given.    Dispo  -Patient meeting all postpartum and postoperative milestones and ready for discharge.  -Patient instructed on wound check appointment and routine postpartum appointment     JORDAN Eason MD
Postpartum Note,  Section  She is a  37y woman who is now post-operative day: #1    Subjective:  The patient feels well.  She is ambulating.   She is tolerating regular diet.  She denies nausea and vomiting.  She is voiding.  Her pain is controlled.  She reports normal postpartum bleeding.        Physical exam:    Vital Signs Last 24 Hrs  T(C): 36.6 (25 May 2023 08:30), Max: 36.8 (25 May 2023 00:48)  T(F): 97.9 (25 May 2023 08:30), Max: 98.3 (25 May 2023 00:48)  HR: 75 (25 May 2023 08:30) (63 - 81)  BP: 127/67 (25 May 2023 08:30) (106/61 - 140/80)  BP(mean): --  RR: 16 (25 May 2023 08:30) (16 - 18)  SpO2: 98% (25 May 2023 08:30) (96% - 100%)    Parameters below as of 25 May 2023 08:30  Patient On (Oxygen Delivery Method): room air        Gen: NAD  Breast: Soft, nontender, not engorged.  Abdomen: Soft, nontender, no distension , firm uterine fundus at umbilicus.  Incision: Clean, dry, and intact with steri strips  Pelvic: Normal lochia noted  Ext: No calf tenderness    LABS:                        7.9    6.52  )-----------( 151      ( 25 May 2023 08:00 )             24.1       Rubella status:     Allergies    Chlorophyll (Rash)  [This allergen will not trigger allergy alert] Chloraprep-pfizer hex prep (Rash; Hives)    Intolerances      MEDICATIONS  (STANDING):  acetaminophen     Tablet .. 975 milliGRAM(s) Oral <User Schedule>  ascorbic acid 500 milliGRAM(s) Oral daily  diphtheria/tetanus/pertussis (acellular) Vaccine (Adacel) 0.5 milliLiter(s) IntraMuscular once  ferrous    sulfate 325 milliGRAM(s) Oral two times a day  ibuprofen  Tablet. 600 milliGRAM(s) Oral every 6 hours  ketorolac   Injectable 30 milliGRAM(s) IV Push every 6 hours  labetalol 400 milliGRAM(s) Oral two times a day  lactated ringers. 1000 milliLiter(s) (125 mL/Hr) IV Continuous <Continuous>  morphine PF Spinal 0.1 milliGRAM(s) IntraThecal. once  oxytocin Infusion 333.333 milliUNIT(s)/Min (1000 mL/Hr) IV Continuous <Continuous>  oxytocin Infusion 333.333 milliUNIT(s)/Min (1000 mL/Hr) IV Continuous <Continuous>    MEDICATIONS  (PRN):  diphenhydrAMINE 25 milliGRAM(s) Oral every 6 hours PRN Pruritus  lanolin Ointment 1 Application(s) Topical every 6 hours PRN Sore Nipples  magnesium hydroxide Suspension 30 milliLiter(s) Oral two times a day PRN Constipation  naloxone Injectable 0.1 milliGRAM(s) IV Push every 3 minutes PRN For ANY of the following changes in patient status:  A. RR LESS THAN 10 breaths per minute, B. Oxygen saturation LESS THAN 90%, C. Sedation score of 6  ondansetron Injectable 4 milliGRAM(s) IV Push every 6 hours PRN Nausea  oxyCODONE    IR 5 milliGRAM(s) Oral every 3 hours PRN Moderate to Severe Pain (4-10)  oxyCODONE    IR 5 milliGRAM(s) Oral once PRN Moderate to Severe Pain (4-10)  simethicone 80 milliGRAM(s) Chew every 4 hours PRN Gas        Assessment and Plan  POD #1 s/p c/s  hct 24%  repeat cbc at 3pm today  Doing well.  Encourage ambulation.  Cont progressive care           
CHIEF COMPLAINT: Patient is a 37y old  Female who presents with a chief complaint of delivery (25 May 2023 10:58)    HPI: 38 y/o female w/ PMHx of hypertension peripartum cardiomyopathy with complete resolution her ejection fraction returned to normal above 55%. POD day # s/p C/S    Doing well, no sob / wheezing /cp  ECG and echo ordered    5/26. doing well, no sob. no c/o    MEDICATIONS:  MEDICATIONS  (STANDING):  acetaminophen     Tablet .. 975 milliGRAM(s) Oral <User Schedule>  ascorbic acid 500 milliGRAM(s) Oral daily  diphtheria/tetanus/pertussis (acellular) Vaccine (Adacel) 0.5 milliLiter(s) IntraMuscular once  ferrous    sulfate 325 milliGRAM(s) Oral two times a day  ibuprofen  Tablet. 600 milliGRAM(s) Oral every 6 hours  labetalol 400 milliGRAM(s) Oral two times a day  lactated ringers. 1000 milliLiter(s) (125 mL/Hr) IV Continuous <Continuous>  morphine PF Spinal 0.1 milliGRAM(s) IntraThecal. once  oxytocin Infusion 333.333 milliUNIT(s)/Min (1000 mL/Hr) IV Continuous <Continuous>  oxytocin Infusion 333.333 milliUNIT(s)/Min (1000 mL/Hr) IV Continuous <Continuous>    MEDICATIONS  (PRN):  diphenhydrAMINE 25 milliGRAM(s) Oral every 6 hours PRN Pruritus  lanolin Ointment 1 Application(s) Topical every 6 hours PRN Sore Nipples  magnesium hydroxide Suspension 30 milliLiter(s) Oral two times a day PRN Constipation  naloxone Injectable 0.1 milliGRAM(s) IV Push every 3 minutes PRN For ANY of the following changes in patient status:  A. RR LESS THAN 10 breaths per minute, B. Oxygen saturation LESS THAN 90%, C. Sedation score of 6  ondansetron Injectable 4 milliGRAM(s) IV Push every 6 hours PRN Nausea  oxyCODONE    IR 5 milliGRAM(s) Oral every 3 hours PRN Moderate to Severe Pain (4-10)  oxyCODONE    IR 5 milliGRAM(s) Oral once PRN Moderate to Severe Pain (4-10)  simethicone 80 milliGRAM(s) Chew every 4 hours PRN Gas    PHYSICAL EXAM:  T(C): 36.8 (26 May 2023 04:00), Max: 36.8 (25 May 2023 18:00)  T(F): 98.2 (26 May 2023 04:00), Max: 98.3 (25 May 2023 18:00)  HR: 83 (26 May 2023 04:00) (78 - 83)  BP: 110/62 (26 May 2023 04:00) (109/62 - 133/73)  RR: 16 (26 May 2023 04:00) (16 - 18)  SpO2: 98% (26 May 2023 04:00) (97% - 98%)    Parameters below as of 26 May 2023 04:00  Patient On (Oxygen Delivery Method): room air      Constitutional: NAD, awake and alert  HEENT: PERR, EOMI, Normal Hearing, MMM  Neck: Soft and supple, No LAD, No JVD  Respiratory: Breath sounds are clear bilaterally, No wheezing, rales or rhonchi  Cardiovascular: S1 and S2, regular rate and rhythm, no Murmurs, gallops or rubs  Gastrointestinal: Bowel Sounds present, incision c/d/i  Extremities: No peripheral edema  Vascular: 2+ peripheral pulses  Neurological: A/O x 3, no focal deficits  Musculoskeletal: 5/5 strength b/l upper and lower extremities  Skin: No rashes    =======================================    EKG 5/25. NSR    ========================================    LABS:             LABS: All Labs Reviewed:                        9.1    7.54  )-----------( 204      ( 25 May 2023 15:17 )             27.7     05-25    136  |  107  |  11  ----------------------------<  77  4.3   |  19<L>  |  0.45<L>    Ca    7.9<L>      25 May 2023 08:00    TPro  4.5<L>  /  Alb  1.6<L>  /  TBili  1.0  /  DBili  x   /  AST  17  /  ALT  20  /  AlkPhos  77  05-25    PT/INR - ( 24 May 2023 13:00 )   PT: 11.4 sec;   INR: 0.98 ratio       PTT - ( 24 May 2023 13:00 )  PTT:28.8 sec          RADIOLOGY & ADDITIONAL STUDIES:    5/25/23: TTE: The mitral valve leaflets appear thin and normal. Moderate (2+) mitral regurgitation is present. Normal aortic valve structure and function. The tricuspid valve leaflets are thin and pliable;valve motion is normal. Mild (1+) tricuspid valve regurgitation is present. Mild pulmonary hypertension. Normal appearing pulmonic valve structure. Mild pulmonic valvular regurgitation (1+) is present. Normal appearing left atrium. The left ventricle cavity is dilated, however, the patient is one day postpartum. The left ventricle is normal in wall thickness, wall motion, and contractility. Estimated left ventricular ejection fraction is 55 %. Normal appearing right atrium. Normal appearing right ventricle structure and function.

## 2023-05-30 ENCOUNTER — APPOINTMENT (OUTPATIENT)
Dept: OBGYN | Facility: CLINIC | Age: 38
End: 2023-05-30
Payer: COMMERCIAL

## 2023-05-30 PROCEDURE — 0503F POSTPARTUM CARE VISIT: CPT

## 2023-05-30 NOTE — HISTORY OF PRESENT ILLNESS
[Clean/Dry/Intact] : clean, dry and intact [Doing Well] : is doing well [No Sign of Infection] : is showing no signs of infection [de-identified] : Doing well. Tolerating diet and ambulation without difficulties. No postop pain. Denies s/s PEC or pp depression. She decided not to breastfeed; denies s/s mastitis. [de-identified] : ROHAN dressing removed [de-identified] : Hemocue= 9.9 [de-identified] : 1. no heavy lifting x 6wks 2. CHTN-- continue BP log, Tx labetalol 400mg po bid, continue f/u w Cards 3. contraception--  will schedule vasectomy 4. RTO 3wks

## 2023-05-31 DIAGNOSIS — O34.211 MATERNAL CARE FOR LOW TRANSVERSE SCAR FROM PREVIOUS CESAREAN DELIVERY: ICD-10-CM

## 2023-05-31 DIAGNOSIS — I08.1 RHEUMATIC DISORDERS OF BOTH MITRAL AND TRICUSPID VALVES: ICD-10-CM

## 2023-05-31 DIAGNOSIS — I27.20 PULMONARY HYPERTENSION, UNSPECIFIED: ICD-10-CM

## 2023-05-31 DIAGNOSIS — I42.9 CARDIOMYOPATHY, UNSPECIFIED: ICD-10-CM

## 2023-05-31 DIAGNOSIS — D62 ACUTE POSTHEMORRHAGIC ANEMIA: ICD-10-CM

## 2023-05-31 DIAGNOSIS — Z3A.37 37 WEEKS GESTATION OF PREGNANCY: ICD-10-CM

## 2023-06-06 LAB — HEMOGLOBIN: 9.9

## 2023-06-11 ENCOUNTER — NON-APPOINTMENT (OUTPATIENT)
Age: 38
End: 2023-06-11

## 2023-06-20 ENCOUNTER — APPOINTMENT (OUTPATIENT)
Dept: OBGYN | Facility: CLINIC | Age: 38
End: 2023-06-20
Payer: COMMERCIAL

## 2023-06-20 VITALS — SYSTOLIC BLOOD PRESSURE: 122 MMHG | DIASTOLIC BLOOD PRESSURE: 68 MMHG | WEIGHT: 246 LBS | BODY MASS INDEX: 38.53 KG/M2

## 2023-06-20 DIAGNOSIS — Z48.89 ENCOUNTER FOR OTHER SPECIFIED SURGICAL AFTERCARE: ICD-10-CM

## 2023-06-20 PROCEDURE — 0503F POSTPARTUM CARE VISIT: CPT

## 2023-06-20 NOTE — HISTORY OF PRESENT ILLNESS
[Healed] : healed [Doing Well] : is doing well [No Sign of Infection] : is showing no signs of infection [de-identified] : Doing well. Tolerating diet and ambulation without difficulties. No postop pain. Denies s/s PEC or pp depression. She decided not to breastfeed; denies s/s mastitis. [de-identified] :  E 0/30 [de-identified] : 1. no heavy lifting x 6wks 2. CHTN-- continue BP log, Tx labetalol 400mg po bid; Amlodipine started by Cardiologist, continue f/u w Cards 3. contraception--  will schedule vasectomy 4. RTO 2mo for annual exam

## 2023-06-28 ENCOUNTER — EMERGENCY (EMERGENCY)
Facility: HOSPITAL | Age: 38
LOS: 0 days | Discharge: HOME | End: 2023-06-28
Attending: FAMILY MEDICINE
Payer: COMMERCIAL

## 2023-06-28 VITALS
RESPIRATION RATE: 18 BRPM | WEIGHT: 250 LBS | HEART RATE: 75 BPM | SYSTOLIC BLOOD PRESSURE: 161 MMHG | HEIGHT: 67 IN | OXYGEN SATURATION: 100 % | TEMPERATURE: 98 F | DIASTOLIC BLOOD PRESSURE: 98 MMHG

## 2023-06-28 VITALS
SYSTOLIC BLOOD PRESSURE: 145 MMHG | DIASTOLIC BLOOD PRESSURE: 75 MMHG | TEMPERATURE: 98 F | HEART RATE: 82 BPM | RESPIRATION RATE: 17 BRPM | OXYGEN SATURATION: 100 %

## 2023-06-28 DIAGNOSIS — R10.13 EPIGASTRIC PAIN: ICD-10-CM

## 2023-06-28 DIAGNOSIS — K80.70 CALCULUS OF GALLBLADDER AND BILE DUCT WITHOUT CHOLECYSTITIS WITHOUT OBSTRUCTION: ICD-10-CM

## 2023-06-28 DIAGNOSIS — Z91.048 OTHER NONMEDICINAL SUBSTANCE ALLERGY STATUS: ICD-10-CM

## 2023-06-28 DIAGNOSIS — D17.1 BENIGN LIPOMATOUS NEOPLASM OF SKIN AND SUBCUTANEOUS TISSUE OF TRUNK: Chronic | ICD-10-CM

## 2023-06-28 DIAGNOSIS — Z98.891 HISTORY OF UTERINE SCAR FROM PREVIOUS SURGERY: Chronic | ICD-10-CM

## 2023-06-28 DIAGNOSIS — K76.0 FATTY (CHANGE OF) LIVER, NOT ELSEWHERE CLASSIFIED: ICD-10-CM

## 2023-06-28 DIAGNOSIS — Z98.891 HISTORY OF UTERINE SCAR FROM PREVIOUS SURGERY: ICD-10-CM

## 2023-06-28 LAB
ALBUMIN SERPL ELPH-MCNC: 3.6 G/DL — SIGNIFICANT CHANGE UP (ref 3.3–5)
ALP SERPL-CCNC: 82 U/L — SIGNIFICANT CHANGE UP (ref 40–120)
ALT FLD-CCNC: 40 U/L — SIGNIFICANT CHANGE UP (ref 12–78)
ANION GAP SERPL CALC-SCNC: 6 MMOL/L — SIGNIFICANT CHANGE UP (ref 5–17)
APPEARANCE UR: CLEAR — SIGNIFICANT CHANGE UP
APTT BLD: 32.2 SEC — SIGNIFICANT CHANGE UP (ref 27.5–35.5)
AST SERPL-CCNC: 21 U/L — SIGNIFICANT CHANGE UP (ref 15–37)
BASOPHILS # BLD AUTO: 0.03 K/UL — SIGNIFICANT CHANGE UP (ref 0–0.2)
BASOPHILS NFR BLD AUTO: 0.4 % — SIGNIFICANT CHANGE UP (ref 0–2)
BILIRUB SERPL-MCNC: 0.7 MG/DL — SIGNIFICANT CHANGE UP (ref 0.2–1.2)
BILIRUB UR-MCNC: NEGATIVE — SIGNIFICANT CHANGE UP
BLD GP AB SCN SERPL QL: SIGNIFICANT CHANGE UP
BUN SERPL-MCNC: 13 MG/DL — SIGNIFICANT CHANGE UP (ref 7–23)
CALCIUM SERPL-MCNC: 9 MG/DL — SIGNIFICANT CHANGE UP (ref 8.5–10.1)
CHLORIDE SERPL-SCNC: 109 MMOL/L — HIGH (ref 96–108)
CO2 SERPL-SCNC: 26 MMOL/L — SIGNIFICANT CHANGE UP (ref 22–31)
COLOR SPEC: YELLOW — SIGNIFICANT CHANGE UP
CREAT SERPL-MCNC: 0.89 MG/DL — SIGNIFICANT CHANGE UP (ref 0.5–1.3)
DIFF PNL FLD: NEGATIVE — SIGNIFICANT CHANGE UP
EGFR: 85 ML/MIN/1.73M2 — SIGNIFICANT CHANGE UP
EOSINOPHIL # BLD AUTO: 0.29 K/UL — SIGNIFICANT CHANGE UP (ref 0–0.5)
EOSINOPHIL NFR BLD AUTO: 4.1 % — SIGNIFICANT CHANGE UP (ref 0–6)
GLUCOSE SERPL-MCNC: 90 MG/DL — SIGNIFICANT CHANGE UP (ref 70–99)
GLUCOSE UR QL: NEGATIVE — SIGNIFICANT CHANGE UP
HCT VFR BLD CALC: 34 % — LOW (ref 34.5–45)
HGB BLD-MCNC: 11.3 G/DL — LOW (ref 11.5–15.5)
IMM GRANULOCYTES NFR BLD AUTO: 0.4 % — SIGNIFICANT CHANGE UP (ref 0–0.9)
INR BLD: 1.02 RATIO — SIGNIFICANT CHANGE UP (ref 0.88–1.16)
KETONES UR-MCNC: NEGATIVE — SIGNIFICANT CHANGE UP
LEUKOCYTE ESTERASE UR-ACNC: NEGATIVE — SIGNIFICANT CHANGE UP
LIDOCAIN IGE QN: 61 U/L — LOW (ref 73–393)
LYMPHOCYTES # BLD AUTO: 1.15 K/UL — SIGNIFICANT CHANGE UP (ref 1–3.3)
LYMPHOCYTES # BLD AUTO: 16.4 % — SIGNIFICANT CHANGE UP (ref 13–44)
MCHC RBC-ENTMCNC: 27.8 PG — SIGNIFICANT CHANGE UP (ref 27–34)
MCHC RBC-ENTMCNC: 33.2 GM/DL — SIGNIFICANT CHANGE UP (ref 32–36)
MCV RBC AUTO: 83.7 FL — SIGNIFICANT CHANGE UP (ref 80–100)
MONOCYTES # BLD AUTO: 0.55 K/UL — SIGNIFICANT CHANGE UP (ref 0–0.9)
MONOCYTES NFR BLD AUTO: 7.9 % — SIGNIFICANT CHANGE UP (ref 2–14)
NEUTROPHILS # BLD AUTO: 4.95 K/UL — SIGNIFICANT CHANGE UP (ref 1.8–7.4)
NEUTROPHILS NFR BLD AUTO: 70.8 % — SIGNIFICANT CHANGE UP (ref 43–77)
NITRITE UR-MCNC: NEGATIVE — SIGNIFICANT CHANGE UP
PH UR: 5 — SIGNIFICANT CHANGE UP (ref 5–8)
PLATELET # BLD AUTO: 236 K/UL — SIGNIFICANT CHANGE UP (ref 150–400)
POTASSIUM SERPL-MCNC: 3.4 MMOL/L — LOW (ref 3.5–5.3)
POTASSIUM SERPL-SCNC: 3.4 MMOL/L — LOW (ref 3.5–5.3)
PROT SERPL-MCNC: 7.5 GM/DL — SIGNIFICANT CHANGE UP (ref 6–8.3)
PROT UR-MCNC: NEGATIVE — SIGNIFICANT CHANGE UP
PROTHROM AB SERPL-ACNC: 11.8 SEC — SIGNIFICANT CHANGE UP (ref 10.5–13.4)
RBC # BLD: 4.06 M/UL — SIGNIFICANT CHANGE UP (ref 3.8–5.2)
RBC # FLD: 13.6 % — SIGNIFICANT CHANGE UP (ref 10.3–14.5)
SODIUM SERPL-SCNC: 141 MMOL/L — SIGNIFICANT CHANGE UP (ref 135–145)
SP GR SPEC: 1.01 — SIGNIFICANT CHANGE UP (ref 1.01–1.02)
TROPONIN I, HIGH SENSITIVITY RESULT: 5.3 NG/L — SIGNIFICANT CHANGE UP
UROBILINOGEN FLD QL: NEGATIVE — SIGNIFICANT CHANGE UP
WBC # BLD: 7 K/UL — SIGNIFICANT CHANGE UP (ref 3.8–10.5)
WBC # FLD AUTO: 7 K/UL — SIGNIFICANT CHANGE UP (ref 3.8–10.5)

## 2023-06-28 PROCEDURE — 81003 URINALYSIS AUTO W/O SCOPE: CPT

## 2023-06-28 PROCEDURE — 86901 BLOOD TYPING SEROLOGIC RH(D): CPT

## 2023-06-28 PROCEDURE — 84484 ASSAY OF TROPONIN QUANT: CPT

## 2023-06-28 PROCEDURE — 85730 THROMBOPLASTIN TIME PARTIAL: CPT

## 2023-06-28 PROCEDURE — 83690 ASSAY OF LIPASE: CPT

## 2023-06-28 PROCEDURE — 76705 ECHO EXAM OF ABDOMEN: CPT | Mod: 26

## 2023-06-28 PROCEDURE — 71045 X-RAY EXAM CHEST 1 VIEW: CPT | Mod: 26

## 2023-06-28 PROCEDURE — 86900 BLOOD TYPING SEROLOGIC ABO: CPT

## 2023-06-28 PROCEDURE — 76705 ECHO EXAM OF ABDOMEN: CPT

## 2023-06-28 PROCEDURE — 99285 EMERGENCY DEPT VISIT HI MDM: CPT

## 2023-06-28 PROCEDURE — 99284 EMERGENCY DEPT VISIT MOD MDM: CPT | Mod: 25

## 2023-06-28 PROCEDURE — 71045 X-RAY EXAM CHEST 1 VIEW: CPT

## 2023-06-28 PROCEDURE — 80053 COMPREHEN METABOLIC PANEL: CPT

## 2023-06-28 PROCEDURE — 36415 COLL VENOUS BLD VENIPUNCTURE: CPT

## 2023-06-28 PROCEDURE — 86850 RBC ANTIBODY SCREEN: CPT

## 2023-06-28 PROCEDURE — 85025 COMPLETE CBC W/AUTO DIFF WBC: CPT

## 2023-06-28 PROCEDURE — 85610 PROTHROMBIN TIME: CPT

## 2023-06-28 RX ORDER — SODIUM CHLORIDE 9 MG/ML
3 INJECTION INTRAMUSCULAR; INTRAVENOUS; SUBCUTANEOUS ONCE
Refills: 0 | Status: COMPLETED | OUTPATIENT
Start: 2023-06-28 | End: 2023-06-28

## 2023-06-28 RX ADMIN — SODIUM CHLORIDE 3 MILLILITER(S): 9 INJECTION INTRAMUSCULAR; INTRAVENOUS; SUBCUTANEOUS at 16:43

## 2023-06-28 NOTE — ED STATDOCS - PROGRESS NOTE DETAILS
Patient seen and evaluated, ED attending note and orders reviewed, will continue with patient follow up and care -Cari Jean PA-C labs WNL, trop negative, CXR with NAD, RUQ US Cholelithiasis. No sonographic finding to suggest acute cholecystitis. Enlarged liver with mild steatosis. discussed findings with pt, advised low fat diet, general surgery f/u for elective cholecystectomy, S&S of acute anabell and return precautions given  Cari Jean PA-C

## 2023-06-28 NOTE — ED STATDOCS - NSFOLLOWUPINSTRUCTIONS_ED_ALL_ED_FT
Biliary Colic, Adult    Biliary colic is severe pain caused by a problem with the gallbladder. The gallbladder is a small organ in the upper right part of the abdomen. The gallbladder stores a digestive fluid produced in the liver (bile) that helps the body break down fat. Bile and other digestive enzymes are carried from the liver to the small intestine through tube-like structures called bile ducts. The gallbladder and the bile ducts form the biliary tract.    Sometimes, hard deposits of digestive fluids (gallstones) form in the gallbladder and block the flow of bile from the gallbladder, causing biliary colic. This condition is also called a gallbladder attack. Gallstones can be as small as a grain of sand or as big as a golf ball. There could be just one gallstone in the gallbladder, or there could be many.    What are the causes?  This condition is usually caused by gallstones. Less often, a tumor could block the flow of bile from the gallbladder and trigger biliary colic.    What increases the risk?  The following factors may make you more likely to develop this condition:  Being female.  Having a family history of gallstones.  Being obese.  Losing weight suddenly or quickly.  Eating a diet that is high in calories, low in fiber, and rich in refined carbohydrates, such as white bread and white rice.  Having certain health conditions, such as:  An intestinal disease that affects nutrient absorption, such as Crohn's disease.  A metabolic condition, such as diabetes or metabolic syndrome. Metabolic syndrome occurs when someone has high blood pressure, high cholesterol, and diabetes.  A blood condition, such as hemolytic anemia or sickle cell disease.  What are the signs or symptoms?  The main symptom of this condition is severe pain in the upper right side of the abdomen. You may feel this pain below the chest but above the hip. This pain often occurs at night or after eating a meal that is high in fat. This pain may get worse for up to an hour and last as long as 12 hours. In most cases, the pain fades (subsides) within 2 hours.    Other symptoms of this condition include:  Nausea and vomiting.  Pain under the right shoulder.  How is this diagnosed?  This condition is diagnosed based on your medical history, your symptoms, and a physical exam.    You may also have tests, including:  Blood tests to rule out infection or inflammation of the bile ducts, gallbladder, pancreas, or liver.  Imaging studies, such as:  An ultrasound.  A CT scan.  An MRI.  In some cases, you may need to have an imaging study done using a small amount of radioactive material (nuclear medicine) to confirm the diagnosis.    How is this treated?  This condition may be treated with medicines to:  Relieve your pain or nausea.  Dissolve the gallstones. It may take months or years before the gallstones are completely gone.  If you have gallstones, or if you have a tumor in the gallbladder that is causing biliary colic, you may need surgery to remove the gallbladder (cholecystectomy).    Follow these instructions at home:  Eating and drinking    Drink enough fluid to keep your urine pale yellow.  Follow instructions from your health care provider about eating or drinking restrictions. These may include avoiding:  Fatty, greasy, and fried foods.  Any foods that make the pain worse.  Overeating.  Having a large meal after not eating for a while.  General instructions    Take over-the-counter and prescription medicines only as told by your health care provider.  Keep all follow-up visits as told by your health care provider. This is important.  How is this prevented?  Steps to prevent this condition include:  Maintaining a healthy body weight.  Getting regular exercise.  Eating a healthy diet that is high in fiber and low in fat.  Limiting how much sugar and refined carbohydrates you eat.  Contact a health care provider if:  Your pain lasts more than 5 hours.  You vomit.  You have a fever and chills.  Your pain gets worse.  Get help right away if:  Your skin or the whites of your eyes look yellow (jaundice).  Your have tea-colored urine and light-colored stools (feces).  You are dizzy or you faint.  Summary  Biliary colic is severe pain caused by a problem with the gallbladder. The gallbladder is a small organ in the upper right part of your abdomen.  Treatment for this condition may include medicine to relieve your pain or nausea, or medicine to slowly dissolve the gallstones.  If you have gallstones, or if you have a tumor in the gallbladder that is causing biliary colic, you may need surgery to remove the gallbladder (cholecystectomy).  This information is not intended to replace advice given to you by your health care provider. Make sure you discuss any questions you have with your health care provider.

## 2023-06-28 NOTE — ED ADULT NURSE NOTE - HISTORY OF COVID-19 VACCINATION
[FreeTextEntry3] : mother [de-identified] : rash [FreeTextEntry6] : 1 yr old male with redness on dorsum of penis that comes and goes for 1.5 wk. No pain. He is irritated when he gets his diaper changed. He is not yet potty trained. No diarrhea. His penis circumcised. Mother has been applying aquaphor. Vaccine status unknown

## 2023-06-28 NOTE — ED ADULT TRIAGE NOTE - CHIEF COMPLAINT QUOTE
pt presents to ED from home for abdominal pain. severe onset this afternoon. s/p  5 weeks ago. states pain above incision site. no n/v/d. pain subsided by time ambulance arrived and is not present at this time.

## 2023-06-28 NOTE — ED STATDOCS - CARE PROVIDER_API CALL
[facility]  Surgery  [address], [address]  [place] [address]  Phone: [phone]  Fax: [phone]  Follow Up Time:     [facility]  Surgery  [address] [place] [address]  Phone: [phone]  Fax: [phone]  Follow Up Time:

## 2023-06-28 NOTE — ED STATDOCS - OBJECTIVE STATEMENT
38 y/o female with PMHx of HTN on amlodipine and Labetalol 200mg x2, s/p  5 weeks ago presents to the ED BIBA c/o severe abdominal pain originating in the epigastric region, onset this afternoon 20 minutes after eating and lasting 10 minutes. Patient had a normal BM without relief; took 2 Tums with relief. Denies nausea, diarrhea, SOB, fever, vaginal bleeding, sore throat, ear pain, or dysuria. Denies possibility of pregnancy.  OB: Dr. Hines  Cardiologist: Dr. Trammell 38 y/o female with PMHx of HTN on amlodipine and Labetalol 200mg x2, s/p  5 weeks ago presents to the ED BIBA c/o severe epigastric abdominal pain, sudden onset this afternoon 20 minutes after eating and lasting 10 minutes. Patient had a normal BM without relief; took 2 Tums with relief. Denies nausea, diarrhea, SOB, fever, vaginal bleeding, sore throat, ear pain, or dysuria. Denies possibility of pregnancy. Nonsmoker, nondrinker. NKDA.   OB: Dr. Hines  Cardiologist: Dr. Trammell

## 2023-06-28 NOTE — ED ADULT NURSE NOTE - NSFALLUNIVINTERV_ED_ALL_ED
Bed/Stretcher in lowest position, wheels locked, appropriate side rails in place/Call bell, personal items and telephone in reach/Instruct patient to call for assistance before getting out of bed/chair/stretcher/Non-slip footwear applied when patient is off stretcher/[place] to call system/Physically safe environment - no spills, clutter or unnecessary equipment/Purposeful proactive rounding/Room/bathroom lighting operational, light cord in reach

## 2023-06-28 NOTE — ED STATDOCS - CLINICAL SUMMARY MEDICAL DECISION MAKING FREE TEXT BOX
Patient with history of HTN, s/p  5 weeks, here with sudden onset epigastric right-sided abdominal pain, which dissipated after about 10 minutes. Will do labs, US, CXR, and reevaluate.

## 2023-06-28 NOTE — ED STATDOCS - GASTROINTESTINAL, MLM
abdomen soft, non-tender, and non-distended. Bowel sounds present. RUQ TTP but no rebound. Surgical scar was clean.

## 2023-06-28 NOTE — ED STATDOCS - PATIENT PORTAL LINK FT
You can access the FollowMyHealth Patient Portal offered by [facility] by registering at the following website: [email or web address]. By joining [facility]’s FollowMyHealth portal, you will also be able to view your health information using other applications (apps) compatible with our system.

## 2023-07-12 ENCOUNTER — OUTPATIENT (OUTPATIENT)
Dept: OUTPATIENT SERVICES | Facility: HOSPITAL | Age: 38
LOS: 1 days | End: 2023-07-12
Payer: COMMERCIAL

## 2023-07-12 DIAGNOSIS — Z01.818 ENCOUNTER FOR OTHER PREPROCEDURAL EXAMINATION: ICD-10-CM

## 2023-07-12 DIAGNOSIS — Z98.891 HISTORY OF UTERINE SCAR FROM PREVIOUS SURGERY: Chronic | ICD-10-CM

## 2023-07-12 DIAGNOSIS — D17.1 BENIGN LIPOMATOUS NEOPLASM OF SKIN AND SUBCUTANEOUS TISSUE OF TRUNK: Chronic | ICD-10-CM

## 2023-07-12 DIAGNOSIS — Z98.890 OTHER SPECIFIED POSTPROCEDURAL STATES: Chronic | ICD-10-CM

## 2023-07-12 LAB
ALBUMIN SERPL ELPH-MCNC: 3.6 G/DL — SIGNIFICANT CHANGE UP (ref 3.3–5)
ALP SERPL-CCNC: 79 U/L — SIGNIFICANT CHANGE UP (ref 40–120)
ALT FLD-CCNC: 59 U/L — SIGNIFICANT CHANGE UP (ref 12–78)
AMYLASE P1 CFR SERPL: 23 U/L — LOW (ref 25–115)
ANION GAP SERPL CALC-SCNC: 4 MMOL/L — LOW (ref 5–17)
AST SERPL-CCNC: 27 U/L — SIGNIFICANT CHANGE UP (ref 15–37)
BASOPHILS # BLD AUTO: 0.03 K/UL — SIGNIFICANT CHANGE UP (ref 0–0.2)
BASOPHILS NFR BLD AUTO: 0.6 % — SIGNIFICANT CHANGE UP (ref 0–2)
BILIRUB SERPL-MCNC: 0.9 MG/DL — SIGNIFICANT CHANGE UP (ref 0.2–1.2)
BLD GP AB SCN SERPL QL: SIGNIFICANT CHANGE UP
BUN SERPL-MCNC: 12 MG/DL — SIGNIFICANT CHANGE UP (ref 7–23)
CALCIUM SERPL-MCNC: 8.6 MG/DL — SIGNIFICANT CHANGE UP (ref 8.5–10.1)
CHLORIDE SERPL-SCNC: 110 MMOL/L — HIGH (ref 96–108)
CO2 SERPL-SCNC: 26 MMOL/L — SIGNIFICANT CHANGE UP (ref 22–31)
CREAT SERPL-MCNC: 0.71 MG/DL — SIGNIFICANT CHANGE UP (ref 0.5–1.3)
EGFR: 112 ML/MIN/1.73M2 — SIGNIFICANT CHANGE UP
EOSINOPHIL # BLD AUTO: 0.21 K/UL — SIGNIFICANT CHANGE UP (ref 0–0.5)
EOSINOPHIL NFR BLD AUTO: 4.2 % — SIGNIFICANT CHANGE UP (ref 0–6)
GLUCOSE SERPL-MCNC: 98 MG/DL — SIGNIFICANT CHANGE UP (ref 70–99)
HCT VFR BLD CALC: 36.4 % — SIGNIFICANT CHANGE UP (ref 34.5–45)
HGB BLD-MCNC: 11.9 G/DL — SIGNIFICANT CHANGE UP (ref 11.5–15.5)
IMM GRANULOCYTES NFR BLD AUTO: 0.4 % — SIGNIFICANT CHANGE UP (ref 0–0.9)
LIDOCAIN IGE QN: 64 U/L — LOW (ref 73–393)
LYMPHOCYTES # BLD AUTO: 1.07 K/UL — SIGNIFICANT CHANGE UP (ref 1–3.3)
LYMPHOCYTES # BLD AUTO: 21.2 % — SIGNIFICANT CHANGE UP (ref 13–44)
MCHC RBC-ENTMCNC: 27.3 PG — SIGNIFICANT CHANGE UP (ref 27–34)
MCHC RBC-ENTMCNC: 32.7 GM/DL — SIGNIFICANT CHANGE UP (ref 32–36)
MCV RBC AUTO: 83.5 FL — SIGNIFICANT CHANGE UP (ref 80–100)
MONOCYTES # BLD AUTO: 0.45 K/UL — SIGNIFICANT CHANGE UP (ref 0–0.9)
MONOCYTES NFR BLD AUTO: 8.9 % — SIGNIFICANT CHANGE UP (ref 2–14)
NEUTROPHILS # BLD AUTO: 3.26 K/UL — SIGNIFICANT CHANGE UP (ref 1.8–7.4)
NEUTROPHILS NFR BLD AUTO: 64.7 % — SIGNIFICANT CHANGE UP (ref 43–77)
PLATELET # BLD AUTO: 253 K/UL — SIGNIFICANT CHANGE UP (ref 150–400)
POTASSIUM SERPL-MCNC: 3.7 MMOL/L — SIGNIFICANT CHANGE UP (ref 3.5–5.3)
POTASSIUM SERPL-SCNC: 3.7 MMOL/L — SIGNIFICANT CHANGE UP (ref 3.5–5.3)
PROT SERPL-MCNC: 7.4 GM/DL — SIGNIFICANT CHANGE UP (ref 6–8.3)
RBC # BLD: 4.36 M/UL — SIGNIFICANT CHANGE UP (ref 3.8–5.2)
RBC # FLD: 13.8 % — SIGNIFICANT CHANGE UP (ref 10.3–14.5)
SODIUM SERPL-SCNC: 140 MMOL/L — SIGNIFICANT CHANGE UP (ref 135–145)
WBC # BLD: 5.04 K/UL — SIGNIFICANT CHANGE UP (ref 3.8–10.5)
WBC # FLD AUTO: 5.04 K/UL — SIGNIFICANT CHANGE UP (ref 3.8–10.5)

## 2023-07-12 PROCEDURE — 83690 ASSAY OF LIPASE: CPT

## 2023-07-12 PROCEDURE — 86901 BLOOD TYPING SEROLOGIC RH(D): CPT

## 2023-07-12 PROCEDURE — 80053 COMPREHEN METABOLIC PANEL: CPT

## 2023-07-12 PROCEDURE — 82150 ASSAY OF AMYLASE: CPT

## 2023-07-12 PROCEDURE — 86850 RBC ANTIBODY SCREEN: CPT

## 2023-07-12 PROCEDURE — 86900 BLOOD TYPING SEROLOGIC ABO: CPT

## 2023-07-12 PROCEDURE — 36415 COLL VENOUS BLD VENIPUNCTURE: CPT

## 2023-07-12 PROCEDURE — 85025 COMPLETE CBC W/AUTO DIFF WBC: CPT

## 2023-07-12 NOTE — CHART NOTE - NSCHARTNOTEFT_GEN_A_CORE
37 year old female diagnosed with cholelithiasis; presents to PST for planned laparoscopic cholecystectomy       Plan:  1. PST instructions given ; NPO status/  instructions to be given by ASU   2. Pt instructed to take following meds on day of surgery: labetalol zyrtec   3. Pt instructed to take routine evening medications unless indicated   4. Stop NSAIDS ( Aspirin Alev Motrin Mobic Diclofenac), herbal supplements , MVI , Vitamin fish oil 7 days prior to surgery  unless   directed by surgeon or cardiologist;   5. Medical Optimization  with Dr Us   6. Urine for pregnancy on day of surgery   7. Labs EKG as per surgeon request   8. Pt denies covid symptoms shortness of breath fever cough

## 2023-07-12 NOTE — ASU PATIENT PROFILE, ADULT - NSICDXPASTMEDICALHX_GEN_ALL_CORE_FT
PAST MEDICAL HISTORY:  Cholelithiasis     HTN (hypertension)     Obese     Postpartum cardiomyopathy     Seasonal allergies      PAST MEDICAL HISTORY:  Cholelithiasis     HTN (hypertension)     Obese     ALTAGRACIA (obstructive sleep apnea)     Postpartum cardiomyopathy     Seasonal allergies

## 2023-07-13 DIAGNOSIS — Z01.818 ENCOUNTER FOR OTHER PREPROCEDURAL EXAMINATION: ICD-10-CM

## 2023-07-18 ENCOUNTER — APPOINTMENT (OUTPATIENT)
Dept: HEART FAILURE | Facility: CLINIC | Age: 38
End: 2023-07-18
Payer: COMMERCIAL

## 2023-07-18 VITALS
SYSTOLIC BLOOD PRESSURE: 118 MMHG | HEIGHT: 67 IN | OXYGEN SATURATION: 97 % | WEIGHT: 146.5 LBS | BODY MASS INDEX: 22.99 KG/M2 | DIASTOLIC BLOOD PRESSURE: 72 MMHG | HEART RATE: 68 BPM

## 2023-07-18 PROBLEM — E66.9 OBESITY, UNSPECIFIED: Chronic | Status: ACTIVE | Noted: 2023-07-12

## 2023-07-18 PROBLEM — K80.20 CALCULUS OF GALLBLADDER WITHOUT CHOLECYSTITIS WITHOUT OBSTRUCTION: Chronic | Status: ACTIVE | Noted: 2023-07-12

## 2023-07-18 PROBLEM — J30.2 OTHER SEASONAL ALLERGIC RHINITIS: Chronic | Status: ACTIVE | Noted: 2023-07-12

## 2023-07-18 PROBLEM — G47.33 OBSTRUCTIVE SLEEP APNEA (ADULT) (PEDIATRIC): Chronic | Status: ACTIVE | Noted: 2023-07-12

## 2023-07-18 PROCEDURE — 99214 OFFICE O/P EST MOD 30 MIN: CPT

## 2023-07-18 RX ORDER — ASPIRIN 81 MG
81 TABLET, DELAYED RELEASE (ENTERIC COATED) ORAL EVERY OTHER DAY
Refills: 0 | Status: DISCONTINUED | COMMUNITY
End: 2023-07-18

## 2023-07-18 RX ORDER — VITAMIN C, CALCIUM, IRON, VITAMIN D3, VITAMIN E, VITAMIN B1, VITAMIN B2, VITAMIN B3, VITAMIN B6, FOLIC ACID, IODINE, ZINC, COPPER, DOCUSATE SODIUM, DOCOSAHEXAENOIC ACID (DHA) 27-1-50 MG
KIT ORAL
Refills: 0 | Status: DISCONTINUED | COMMUNITY
End: 2023-07-18

## 2023-07-18 RX ORDER — GLUC/MSM/COLGN2/HYAL/ANTIARTH3 375-375-20
TABLET ORAL
Refills: 0 | Status: DISCONTINUED | COMMUNITY
End: 2023-07-18

## 2023-07-18 NOTE — HISTORY OF PRESENT ILLNESS
[FreeTextEntry1] : Patient is a 37 year old female with history of HTN and peripartum cardiomyopathy who comes in to establish care with cardio-ob. The patient was diagnosed with HTN in 2015 and at the time she was on antihypertensives. She was subsequently transitioned to labetalol when she was trying to conceived her first child. She became pregnant and her pregnancy was unremarkable. She delivered via csection at term. About 10 weeks after her pregnancy she developed SOB and significant SHARP (she could not even push her baby in the stroller). She was initially diagnosed with a URI but eventually was found to have a low EF. She was given IV lasix and was started on GDMT. Her EF recovered within a couple of months to completely normal. She was transitioned back to labetalol  because she was trying to have a baby again and now she is 6 months pregnant with her current child. \par \par 5/2/23: The patient comes in for routine visit in her third trimester of pregnancy.. She denies any significant dizziness, chest pain, SOB, SHARP, orthopnea, PND or lower extremity swelling. She is scheduled to undergo a  planned Csection at 39 weeks. She has been feeling well and taking her labetalol. Her blood pressure has been controlled on this regimen\par \par 7/18/23: The patient recently delivered her baby 9 weeks ago via C section. Her BP was controlled on her regimen of labetalol. She had an echo done in Dr. Trammell's office which showed an EF of 50-55%. She was started on amlodipine in addition to the labetalol. Overall she is doing well and she denies chest pain, SOB, SHARP, abdominal pain or lower extremity edema

## 2023-07-18 NOTE — PHYSICAL EXAM
[Well Developed] : well developed [Well Nourished] : well nourished [No Acute Distress] : no acute distress [Normal Conjunctiva] : normal conjunctiva [Normal Venous Pressure] : normal venous pressure [Normal S1, S2] : normal S1, S2 [No Murmur] : no murmur [Clear Lung Fields] : clear lung fields [Good Air Entry] : good air entry [Normal Gait] : normal gait [No Edema] : no edema [No Cyanosis] : no cyanosis [No Rash] : no rash [Soft] : abdomen soft [Non Tender] : non-tender

## 2023-07-18 NOTE — ASSESSMENT
[FreeTextEntry1] : Patient is a 37 year old female with history of HTN and peripartum cardiomyopathy who is currently 6 months pregnancy comes in for followup\par \par #Peripartum cardiomyopathy: Patient has a history of peripartum cardiomyopathy with a now recovered EF after her last pregnancy.She had a recent echo in April 2023 which showed normal biventricular function. She had her baby in May without any signficant complications. She had a repeat echo recently which showed an EF of 50-55%  On exam she is warm and well perfused without edema\par -She is currently on labetalol 200mg TID and amlodipine 5mg \par - Given her history of peripartum CM and the fact that she needs a BP agent, will start losartan 25mg. Will stop amlodipine and reduce labetalol to 200mg BID\par - On the next visit will increase losartan and wean off labetalol alltogether. \par - If she requires another agent for BP will add Toprol or Coreg which will be cardioprotective\par - Plan discussed with Dr. Trammell who is in agreement\par \par \par #HTN\par controlled on above

## 2023-07-18 NOTE — CARDIOLOGY SUMMARY
[de-identified] : 2/2023: CHRISTIN [de-identified] : 12/2022: LVEF 55-60%, normal RV, normal left and right atrium, mild MR

## 2023-07-20 ENCOUNTER — OUTPATIENT (OUTPATIENT)
Dept: INPATIENT UNIT | Facility: HOSPITAL | Age: 38
LOS: 1 days | Discharge: HOME | End: 2023-07-20
Payer: COMMERCIAL

## 2023-07-20 ENCOUNTER — TRANSCRIPTION ENCOUNTER (OUTPATIENT)
Age: 38
End: 2023-07-20

## 2023-07-20 VITALS
HEIGHT: 67 IN | OXYGEN SATURATION: 99 % | WEIGHT: 218.04 LBS | TEMPERATURE: 98 F | DIASTOLIC BLOOD PRESSURE: 73 MMHG | RESPIRATION RATE: 16 BRPM | SYSTOLIC BLOOD PRESSURE: 129 MMHG | HEART RATE: 68 BPM

## 2023-07-20 VITALS
SYSTOLIC BLOOD PRESSURE: 153 MMHG | OXYGEN SATURATION: 97 % | TEMPERATURE: 98 F | HEART RATE: 65 BPM | DIASTOLIC BLOOD PRESSURE: 95 MMHG | RESPIRATION RATE: 16 BRPM

## 2023-07-20 DIAGNOSIS — I10 ESSENTIAL (PRIMARY) HYPERTENSION: ICD-10-CM

## 2023-07-20 DIAGNOSIS — Z98.890 OTHER SPECIFIED POSTPROCEDURAL STATES: Chronic | ICD-10-CM

## 2023-07-20 DIAGNOSIS — K21.9 GASTRO-ESOPHAGEAL REFLUX DISEASE WITHOUT ESOPHAGITIS: ICD-10-CM

## 2023-07-20 DIAGNOSIS — K76.89 OTHER SPECIFIED DISEASES OF LIVER: ICD-10-CM

## 2023-07-20 DIAGNOSIS — Z79.899 OTHER LONG TERM (CURRENT) DRUG THERAPY: ICD-10-CM

## 2023-07-20 DIAGNOSIS — K80.10 CALCULUS OF GALLBLADDER WITH CHRONIC CHOLECYSTITIS WITHOUT OBSTRUCTION: ICD-10-CM

## 2023-07-20 DIAGNOSIS — Z98.891 HISTORY OF UTERINE SCAR FROM PREVIOUS SURGERY: Chronic | ICD-10-CM

## 2023-07-20 DIAGNOSIS — K80.20 CALCULUS OF GALLBLADDER WITHOUT CHOLECYSTITIS WITHOUT OBSTRUCTION: ICD-10-CM

## 2023-07-20 DIAGNOSIS — G47.33 OBSTRUCTIVE SLEEP APNEA (ADULT) (PEDIATRIC): ICD-10-CM

## 2023-07-20 LAB — HCG UR QL: NEGATIVE — SIGNIFICANT CHANGE UP

## 2023-07-20 PROCEDURE — 81025 URINE PREGNANCY TEST: CPT

## 2023-07-20 PROCEDURE — 88304 TISSUE EXAM BY PATHOLOGIST: CPT

## 2023-07-20 PROCEDURE — C9399: CPT

## 2023-07-20 PROCEDURE — 47562 LAPAROSCOPIC CHOLECYSTECTOMY: CPT | Mod: AS

## 2023-07-20 PROCEDURE — 88304 TISSUE EXAM BY PATHOLOGIST: CPT | Mod: 26

## 2023-07-20 RX ORDER — SODIUM CHLORIDE 9 MG/ML
1000 INJECTION, SOLUTION INTRAVENOUS
Refills: 0 | Status: DISCONTINUED | OUTPATIENT
Start: 2023-07-20 | End: 2023-07-26

## 2023-07-20 RX ORDER — DIAZEPAM 5 MG
5 TABLET ORAL ONCE
Refills: 0 | Status: DISCONTINUED | OUTPATIENT
Start: 2023-07-20 | End: 2023-07-20

## 2023-07-20 RX ORDER — HYDROCODONE BITARTRATE AND ACETAMINOPHEN 7.5; 325 MG/15ML; MG/15ML
1 SOLUTION ORAL
Qty: 10 | Refills: 0
Start: 2023-07-20 | End: 2023-07-21

## 2023-07-20 RX ORDER — PROCHLORPERAZINE MALEATE 5 MG
10 TABLET ORAL ONCE
Refills: 0 | Status: COMPLETED | OUTPATIENT
Start: 2023-07-20 | End: 2023-07-20

## 2023-07-20 RX ORDER — CETIRIZINE HYDROCHLORIDE 10 MG/1
1 TABLET ORAL
Refills: 0 | DISCHARGE

## 2023-07-20 RX ORDER — SODIUM CHLORIDE 9 MG/ML
1000 INJECTION, SOLUTION INTRAVENOUS
Refills: 0 | Status: DISCONTINUED | OUTPATIENT
Start: 2023-07-20 | End: 2023-07-20

## 2023-07-20 RX ORDER — LABETALOL HCL 100 MG
2 TABLET ORAL
Refills: 0 | DISCHARGE

## 2023-07-20 RX ORDER — AMLODIPINE BESYLATE 2.5 MG/1
1 TABLET ORAL
Refills: 0 | DISCHARGE

## 2023-07-20 RX ORDER — OXYCODONE HYDROCHLORIDE 5 MG/1
5 TABLET ORAL ONCE
Refills: 0 | Status: DISCONTINUED | OUTPATIENT
Start: 2023-07-20 | End: 2023-07-20

## 2023-07-20 RX ORDER — ONDANSETRON 8 MG/1
4 TABLET, FILM COATED ORAL ONCE
Refills: 0 | Status: COMPLETED | OUTPATIENT
Start: 2023-07-20 | End: 2023-07-20

## 2023-07-20 RX ORDER — ONDANSETRON 8 MG/1
1 TABLET, FILM COATED ORAL
Qty: 28 | Refills: 0
Start: 2023-07-20 | End: 2023-07-26

## 2023-07-20 RX ORDER — FENTANYL CITRATE 50 UG/ML
50 INJECTION INTRAVENOUS
Refills: 0 | Status: DISCONTINUED | OUTPATIENT
Start: 2023-07-20 | End: 2023-07-20

## 2023-07-20 RX ADMIN — Medication 5 MILLIGRAM(S): at 17:48

## 2023-07-20 RX ADMIN — Medication 10 MILLIGRAM(S): at 15:30

## 2023-07-20 RX ADMIN — FENTANYL CITRATE 50 MICROGRAM(S): 50 INJECTION INTRAVENOUS at 16:00

## 2023-07-20 RX ADMIN — ONDANSETRON 4 MILLIGRAM(S): 8 TABLET, FILM COATED ORAL at 15:35

## 2023-07-20 RX ADMIN — FENTANYL CITRATE 50 MICROGRAM(S): 50 INJECTION INTRAVENOUS at 16:11

## 2023-07-20 NOTE — ASU DISCHARGE PLAN (ADULT/PEDIATRIC) - NS MD DC FALL RISK RISK
For information on Fall & Injury Prevention, visit: [email or web address] OR  [email or web address] OR  https://www.cdc.gov/steadi/patient.html

## 2023-07-20 NOTE — BRIEF OPERATIVE NOTE - NSICDXBRIEFPOSTOP_GEN_ALL_CORE_FT
POST-OP DIAGNOSIS:  Acute cholecystitis with chronic cholecystitis 20-Jul-2023 15:19:37  [employer]

## 2023-07-20 NOTE — BRIEF OPERATIVE NOTE - NSICDXBRIEFPREOP_GEN_ALL_CORE_FT
PRE-OP DIAGNOSIS:  Acute cholecystitis with chronic cholecystitis 20-Jul-2023 15:19:15  [employer]

## 2023-07-20 NOTE — ASU DISCHARGE PLAN (ADULT/PEDIATRIC) - NURSING INSTRUCTIONS
Refer to the multicolored fact sheet for any problems you experience. If you have difficulty urinating or unable to urinate in 8 hours after surgery, call your Dr., or return to  emergency room.  Notify your Dr. if your fever is 101 or greater. If the pain medicine your  recpapinds does not help you, or you have severe pain call your Dr.. If you cannot reach the doctor, call [facility] Emergency Department at [phone] or go to your local Emergency Department. A responsible adult should be with you for the rest of the day and night for your safety, and to help you. Apply ice to affected area 20min on and 20min off for the  first 24-48 hours. Do not apply directly to skin, place barrier between ice and skin.  Resume your medications as listed on the attached Medication Record.

## 2023-07-20 NOTE — ASU PREOP CHECKLIST - NEEDLE GAUGE
----- Message from Angely Shea MD sent at 5/29/2020  8:24 PM CDT -----  RN, I spoke with the patient regarding the results of his CAT scan the chest.  I am going to substitute his lisinopril for a different agent in case this is contributing to cough. 20

## 2023-08-01 LAB — SURGICAL PATHOLOGY STUDY: SIGNIFICANT CHANGE UP

## 2023-08-21 ENCOUNTER — EMERGENCY (EMERGENCY)
Facility: HOSPITAL | Age: 38
LOS: 0 days | Discharge: HOME | End: 2023-08-21
Attending: EMERGENCY MEDICINE
Payer: COMMERCIAL

## 2023-08-21 VITALS
RESPIRATION RATE: 16 BRPM | DIASTOLIC BLOOD PRESSURE: 105 MMHG | TEMPERATURE: 98 F | OXYGEN SATURATION: 98 % | HEART RATE: 77 BPM | SYSTOLIC BLOOD PRESSURE: 177 MMHG

## 2023-08-21 VITALS — WEIGHT: 250 LBS | HEIGHT: 67 IN

## 2023-08-21 DIAGNOSIS — G47.33 OBSTRUCTIVE SLEEP APNEA (ADULT) (PEDIATRIC): ICD-10-CM

## 2023-08-21 DIAGNOSIS — Z91.048 OTHER NONMEDICINAL SUBSTANCE ALLERGY STATUS: ICD-10-CM

## 2023-08-21 DIAGNOSIS — Z98.890 OTHER SPECIFIED POSTPROCEDURAL STATES: Chronic | ICD-10-CM

## 2023-08-21 DIAGNOSIS — Z98.891 HISTORY OF UTERINE SCAR FROM PREVIOUS SURGERY: ICD-10-CM

## 2023-08-21 DIAGNOSIS — R03.0 ELEVATED BLOOD-PRESSURE READING, WITHOUT DIAGNOSIS OF HYPERTENSION: ICD-10-CM

## 2023-08-21 DIAGNOSIS — Z88.2 ALLERGY STATUS TO SULFONAMIDES: ICD-10-CM

## 2023-08-21 DIAGNOSIS — Z86.39 PERSONAL HISTORY OF OTHER ENDOCRINE, NUTRITIONAL AND METABOLIC DISEASE: ICD-10-CM

## 2023-08-21 DIAGNOSIS — Z98.891 HISTORY OF UTERINE SCAR FROM PREVIOUS SURGERY: Chronic | ICD-10-CM

## 2023-08-21 DIAGNOSIS — I10 ESSENTIAL (PRIMARY) HYPERTENSION: ICD-10-CM

## 2023-08-21 DIAGNOSIS — Z87.19 PERSONAL HISTORY OF OTHER DISEASES OF THE DIGESTIVE SYSTEM: ICD-10-CM

## 2023-08-21 PROCEDURE — 99283 EMERGENCY DEPT VISIT LOW MDM: CPT

## 2023-08-21 PROCEDURE — 93010 ELECTROCARDIOGRAM REPORT: CPT

## 2023-08-21 PROCEDURE — 93005 ELECTROCARDIOGRAM TRACING: CPT

## 2023-08-21 NOTE — ED PROVIDER NOTE - CLINICAL SUMMARY MEDICAL DECISION MAKING FREE TEXT BOX
patient neurovascular intact in no acute distress normal gait no symptoms follow-up with her established cardiologist tomorrow she can maintain her normal blood pressure medication she is 12 weeks postpartum meets no criteria for preeclampsia patient agrees to plan of care follow-up with her cardiologist tomorrow we discussed the blood pressure General patient agrees to plan of care

## 2023-08-21 NOTE — ED ADULT TRIAGE NOTE - CHIEF COMPLAINT QUOTE
12 weeks post  c/o HTN at home. hx cholecystectomy 3 weeks ago. Pt checked BP tonight before taking BP meds and noticed it was high (170/100). Denies chest pain, sob, nausea, headache, blurry vision. Pt had BP meds changed 2 weeks ago by cardiologist. OBGYN: Dr. Hines

## 2023-08-21 NOTE — ED PROVIDER NOTE - NSICDXPASTSURGICALHX_GEN_ALL_CORE_FT
PAST SURGICAL HISTORY:  H/O  section 2018    S/P excision of lipoma low back 2017    
regular rate and rhythm

## 2023-08-21 NOTE — ED ADULT TRIAGE NOTE - WEIGHT IN KG
Problem: Discharge Planning  Goal: Discharge to home or other facility with appropriate resources  12/24/2022 1714 by Lacey Fraire RN  Outcome: Completed  Flowsheets (Taken 12/24/2022 0815)  Discharge to home or other facility with appropriate resources:   Arrange for needed discharge resources and transportation as appropriate   Identify barriers to discharge with patient and caregiver  12/24/2022 0638 by Mike Bravo RN  Outcome: Progressing  Flowsheets (Taken 12/24/2022 0046)  Discharge to home or other facility with appropriate resources: Identify barriers to discharge with patient and caregiver     Problem: Skin/Tissue Integrity  Goal: Absence of new skin breakdown  Description: 1. Monitor for areas of redness and/or skin breakdown  2. Assess vascular access sites hourly  3. Every 4-6 hours minimum:  Change oxygen saturation probe site  4. Every 4-6 hours:  If on nasal continuous positive airway pressure, respiratory therapy assess nares and determine need for appliance change or resting period.   12/24/2022 1714 by Lacey Fraire RN  Outcome: Completed  12/24/2022 0638 by Mike Bravo RN  Outcome: Progressing     Problem: ABCDS Injury Assessment  Goal: Absence of physical injury  12/24/2022 1714 by Lacey Fraire RN  Outcome: Completed  12/24/2022 0638 by Mike Bravo RN  Outcome: Progressing  Flowsheets (Taken 12/24/2022 0127)  Absence of Physical Injury: Implement safety measures based on patient assessment     Problem: Safety - Adult  Goal: Free from fall injury  12/24/2022 1714 by Lacey Fraire RN  Outcome: Completed  12/24/2022 0638 by Mike Bravo RN  Outcome: Progressing  Flowsheets (Taken 12/24/2022 0127)  Free From Fall Injury: Instruct family/caregiver on patient safety     Problem: Chronic Conditions and Co-morbidities  Goal: Patient's chronic conditions and co-morbidity symptoms are monitored and maintained or improved  12/24/2022 1714 by Sumanth Parmar RN  Outcome: Completed  Flowsheets (Taken 12/24/2022 0815)  Care Plan - Patient's Chronic Conditions and Co-Morbidity Symptoms are Monitored and Maintained or Improved: Monitor and assess patient's chronic conditions and comorbid symptoms for stability, deterioration, or improvement  12/24/2022 0638 by Rebeca Barbosa RN  Outcome: Progressing  Flowsheets (Taken 12/24/2022 0046)  Care Plan - Patient's Chronic Conditions and Co-Morbidity Symptoms are Monitored and Maintained or Improved: Monitor and assess patient's chronic conditions and comorbid symptoms for stability, deterioration, or improvement 113.4

## 2023-08-21 NOTE — ED PROVIDER NOTE - PATIENT PORTAL LINK FT
You can access the FollowMyHealth Patient Portal offered by [facility] by registering at the following website: [email or web address]. By joining [facility]’s FollowMyHealth portal, you will also be able to view your health information using other applications (apps) compatible with our system.

## 2023-08-21 NOTE — ED PROVIDER NOTE - OBJECTIVE STATEMENT
Patient presents to ED secondary to taking her blood pressure noticed it was high at home she was completely asymptomatic at the time but she has a follow-up appoint with Dr. Iva jo tomorrow so she was checking her blood pressure.  She is compliant with her labetalol and losartan.  At my evaluation she denies any headache blurry vision seizure-like activity.  No chest pain or shortness of breath.  No abdominal pain.  No motor or sensory deficits.  Has history of peripartum cardiomyopathy normal EF recently had a change to her blood pressure medication 2 weeks ago she is due to follow-up with Dr. Enrique tomorrow

## 2023-08-21 NOTE — ED PROVIDER NOTE - NSICDXPASTMEDICALHX_GEN_ALL_CORE_FT
PAST MEDICAL HISTORY:  Cholelithiasis     HTN (hypertension)     Obese     ALTAGRACIA (obstructive sleep apnea)     Postpartum cardiomyopathy     Seasonal allergies

## 2023-08-22 ENCOUNTER — APPOINTMENT (OUTPATIENT)
Dept: OBGYN | Facility: CLINIC | Age: 38
End: 2023-08-22
Payer: COMMERCIAL

## 2023-08-22 VITALS — WEIGHT: 248 LBS | BODY MASS INDEX: 38.84 KG/M2 | SYSTOLIC BLOOD PRESSURE: 136 MMHG | DIASTOLIC BLOOD PRESSURE: 80 MMHG

## 2023-08-22 DIAGNOSIS — Z01.419 ENCOUNTER FOR GYNECOLOGICAL EXAMINATION (GENERAL) (ROUTINE) W/OUT ABNORMAL FINDINGS: ICD-10-CM

## 2023-08-22 DIAGNOSIS — B35.4 TINEA CORPORIS: ICD-10-CM

## 2023-08-22 PROCEDURE — 99395 PREV VISIT EST AGE 18-39: CPT

## 2023-08-22 RX ORDER — NYSTATIN 100000 1/G
100000 POWDER TOPICAL
Qty: 1 | Refills: 2 | Status: ACTIVE | COMMUNITY
Start: 2023-08-22 | End: 1900-01-01

## 2023-08-29 ENCOUNTER — APPOINTMENT (OUTPATIENT)
Dept: HEART FAILURE | Facility: CLINIC | Age: 38
End: 2023-08-29
Payer: COMMERCIAL

## 2023-08-29 VITALS
HEIGHT: 67 IN | HEART RATE: 68 BPM | WEIGHT: 249 LBS | SYSTOLIC BLOOD PRESSURE: 142 MMHG | OXYGEN SATURATION: 99 % | DIASTOLIC BLOOD PRESSURE: 88 MMHG | BODY MASS INDEX: 39.08 KG/M2

## 2023-08-29 DIAGNOSIS — I10 ESSENTIAL (PRIMARY) HYPERTENSION: ICD-10-CM

## 2023-08-29 PROCEDURE — 99214 OFFICE O/P EST MOD 30 MIN: CPT

## 2023-08-29 RX ORDER — LOSARTAN POTASSIUM 50 MG/1
50 TABLET, FILM COATED ORAL DAILY
Refills: 0 | Status: ACTIVE | COMMUNITY

## 2023-08-29 RX ORDER — LOSARTAN POTASSIUM 25 MG/1
25 TABLET, FILM COATED ORAL DAILY
Qty: 90 | Refills: 3 | Status: DISCONTINUED | COMMUNITY
Start: 2023-07-18 | End: 2023-08-29

## 2023-08-29 RX ORDER — LABETALOL HYDROCHLORIDE 300 MG/1
TABLET, FILM COATED ORAL TWICE DAILY
Refills: 0 | Status: ACTIVE | COMMUNITY

## 2023-08-29 RX ORDER — CETIRIZINE HCL 10 MG
TABLET ORAL
Refills: 0 | Status: ACTIVE | COMMUNITY

## 2023-08-29 NOTE — PHYSICAL EXAM
[Well Developed] : well developed [Well Nourished] : well nourished [No Acute Distress] : no acute distress [Normal Conjunctiva] : normal conjunctiva [Normal Venous Pressure] : normal venous pressure [Normal S1, S2] : normal S1, S2 [No Murmur] : no murmur [Clear Lung Fields] : clear lung fields [Good Air Entry] : good air entry [Soft] : abdomen soft [Non Tender] : non-tender [Normal Gait] : normal gait [No Edema] : no edema [No Cyanosis] : no cyanosis [No Rash] : no rash

## 2023-08-29 NOTE — ASSESSMENT
[FreeTextEntry1] : Patient is a 37 year old female with history of HTN and peripartum cardiomyopathy who is 3 months postpartum and comes in today for followup.  #Peripartum cardiomyopathy: Patient has a history of peripartum cardiomyopathy with a now recovered EF after her last pregnancy. She had a recent echo in April 2023 which showed normal biventricular function. She had her baby in May without any significant complications. She had a repeat echo recently which showed an EF of 50-55%  On exam she is warm and well perfused without edema - She is currently on labetalol 400mg BID and losartan 50 mg daily. - Will add an additional 25 mg of losartan in the evening for better BP control and she will continue 50 mg in the AM - She is scheduled to see her cardiologist, Dr. Us, next week - If her BP is better controlled, the plan will be to increase losartan to 50 mg BID and wean off labetalol - If she requires another agent for BP will add Toprol or Coreg which will be cardioprotective - Will request recent TTE results from her cardiologist. - Last labs from 7/12 showed K 3.7 and sCr 0.71. Last pro-BNP was on 6/9 which was 493. Repeat labs ordered to assess renal function and potassium on the higher dose of losartan.  #HTN - Medication adjustments as above.  RTO in 6 weeks with me.

## 2023-08-29 NOTE — HISTORY OF PRESENT ILLNESS
[FreeTextEntry1] : Patient is a 37 year old female with history of HTN and peripartum cardiomyopathy who comes in for follow-up. The patient was diagnosed with HTN in 2015 and at the time she was on antihypertensives. She was subsequently transitioned to labetalol when she was trying to conceive her first child. She became pregnant and her pregnancy was unremarkable. She delivered via csection at term. About 10 weeks after her pregnancy she developed SOB and significant SHARP (she could not even push her baby in the stroller). She was initially diagnosed with a URI but eventually was found to have a low EF. She was given IV lasix and was started on GDMT. Her EF recovered within a couple of months to completely normal. She was transitioned back to labetalol because she was trying to have a baby again which she delivered in May.   5/2/23: The patient comes in for routine visit in her third trimester of pregnancy.. She denies any significant dizziness, chest pain, SOB, SHARP, orthopnea, PND or lower extremity swelling. She is scheduled to undergo a  planned Csection at 39 weeks. She has been feeling well and taking her labetalol. Her blood pressure has been controlled on this regimen  7/18/23: The patient recently delivered her baby 9 weeks ago via C section. Her BP was controlled on her regimen of labetalol. She had an echo done in Dr. Trammell's office which showed an EF of 50-55%. She was started on amlodipine in addition to the labetalol. Overall she is doing well and she denies chest pain, SOB, SHARP, abdominal pain or lower extremity edema  8/29/23: When last seen in July her amlodipine was switched to losartan and her labetalol dose was decreased. She recently had a cholecystectomy (7/20) so did not make those med changes until 8/9. Shortly after she noted her BP was 170/100 so went to the ED who discharged her and told her to f/u with her cardiologist. They then increased her losartan to 50 mg daily and her labetalol to 400 mg BID. She denies any HF symptoms such as SOB, orthopnea, PND, LH/dizziness, CP, palpitations, or LE edema. Her BP at home fluctuates between 110-120s (after taking her AM medications) to 168/100 in the evening.

## 2023-08-29 NOTE — CARDIOLOGY SUMMARY
[de-identified] : 2/2023: CHRISTIN [de-identified] : 4/2023: LVIDd 5.05cm, LVEF 55-60%, normal RV size/function, mild MR/TR, RVSP 18.6 mmHg. 12/2022: LVEF 55-60%, normal RV, normal left and right atrium, mild MR

## 2023-10-10 ENCOUNTER — APPOINTMENT (OUTPATIENT)
Dept: HEART FAILURE | Facility: CLINIC | Age: 38
End: 2023-10-10

## 2023-10-18 NOTE — ED PROVIDER NOTE - NS ED MD DISPO SPECIAL CONSIDERATION1
None Libtayo Pregnancy And Lactation Text: This medication is contraindicated in pregnancy and when breast feeding.

## 2023-11-13 ENCOUNTER — NON-APPOINTMENT (OUTPATIENT)
Age: 38
End: 2023-11-13

## 2023-11-14 NOTE — PATIENT PROFILE OB - ARE SIGNIFICANT INDICATORS COMPLETE.
H&P update:  The history and physical exam completed by internal medicine consultant for this patient has been reviewed. The patient has been re-examined within the past 24 hours and there are no apparent interval changes to the patient's status. The patient appears ready to proceed with surgery. The patient has been given the opportunity to ask any additional questions regarding the proposed procedure.  The patient appears ready to proceed as planned. All questions were answered.     No Yes

## 2023-11-24 ENCOUNTER — NON-APPOINTMENT (OUTPATIENT)
Age: 38
End: 2023-11-24

## 2024-01-05 ENCOUNTER — NON-APPOINTMENT (OUTPATIENT)
Age: 39
End: 2024-01-05

## 2024-01-10 NOTE — DISCHARGE NOTE OB - NS MD DC PLAN IMMU FLU REF OTH
[facility]  310I/01  PROGRESS NOTE   Patient: Kevin John  Today's Date: 1/10/2024    YOB: 1949  Admission Date: 2024    MRN: [id number]  Inpatient LOS: 1    Attending: Rigo Huitron MD  Hospital Day: Hospital Day: 3    Subjective   HISTORY AND SUBJECTIVE COMPLAINTS     Chief Complaint:   Chest pain    Interval History / Subjective:   The patient reports that he feels great this morning, his chest pain and difficulty with inspiration has completely resolved, he denies any fevers or chills.    Hospital Course:  Kevin John is a 74 year old male who presented on 2024 with complaints of Shortness of Breath  .    ROS:  Pertinent systems negative except as above.    Objective   PHYSICAL EXAMINATION     Vital 24 Hour Range Most Recent Value   Temperature Temp  Min: 97.5 °F (36.4 °C)  Max: 98 °F (36.7 °C) 97.5 °F (36.4 °C)   Pulse Pulse  Min: 91  Max: 103 (!) 102   Respiratory Resp  Min: 18  Max: 22 18   Blood Pressure BP  Min: 127/75  Max: 163/91 (!) 163/91   Pulse Oximetry SpO2  Min: 92 %  Max: 98 % 92 %   Arterial BP No data recorded     O2 O2 Flow Rate (L/min)  Av.9 L/min  Min: 1 L/min   Min taken time: 01/10/24 0904  Max: 2 L/min   Max taken time: 01/10/24 0900       Recorded Intake and Output:  Intake/Output Summary (Last 24 hours) at 1/10/2024 1600  Last data filed at 1/10/2024 0730  Gross per 24 hour   Intake 300 ml   Output 200 ml   Net 100 ml      Recorded Last Stool Occurrence: 1 (01/10/24 1129)     Vital Most Recent Value First Value   Weight 75.5 kg (166 lb 7.2 oz) Weight: 74.8 kg (165 lb)   Height 5' 6\" (167.6 cm) Height: 5' 6\" (167.6 cm)   BMI 26.87 N/A     General: Looks well no apparent distress  CV: regular rate and rhythm  Resp: clear to auscultation bilaterally  Abd: soft, nontender, and nondistended  Ext: no rashes, lesions, or ulcers noted and edema absent   Skin: no rashes, lesions, or ulcers noted  Neuro: no focal deficits noted  Psych: normal  judgement and insight, oriented to time, place and person, and normal mood and affect    TEST RESULTS     Labs: The Laboratory values listed below have been reviewed and pertinent findings discussed in the Assessment and Plan.    Laboratory values:   Recent Labs   Lab 01/10/24  0753 01/09/24  0411 01/08/24  2211   WBC 12.3* 15.3* 14.6*   HGB 12.6* 13.1 12.8*   HCT 38.2* 39.7 39.2    268 293         Recent Labs   Lab 01/10/24  0602 01/08/24  2218 01/08/24  2211   SODIUM 137  --  139   POTASSIUM 4.7  --  4.5   CHLORIDE 100  --  105   CO2 26  --  25   CALCIUM 9.4  --  9.4   GLUCOSE 229*  --  158*   BUN 38*  --  15   CREATININE 1.49*  --  1.02   MG  --  1.8  --         No results found      Radiology: Imaging studies have been reviewed and pertinent findings discussed in the Assessment and Plan.  Results for orders placed or performed during the hospital encounter of 01/08/24 (from the past 48 hour(s))   XR CHEST AP OR PA    Impression    IMPRESSION:  1. Patchy opacity left greater than right lung base and right midlung with  some vascular congestion. Atypical infection could have this appearance.  Follow-up as clinically indicated    Electronically Signed by: José Alcala MD  Signed on: 1/8/2024 10:12 PM  Created on Workstation ID: [id number]  Signed on Workstation ID: [id number]   CTA CHEST PULMONARY EMBOLISM    Impression    IMPRESSION:    1. No evidence of central or large segmental pulm embolus.  2. Cardiomegaly.  3. Interval development of mediastinal and hilar lymphadenopathy with the  largest of node measuring 15 mm in short axis. These are possibly reactive  in nature. Other etiologies are not excluded. Recommend clinical  correlation and short-term follow-up exam.  4. Severe emphysematous changes.  5. Diffuse intralobular septal thickening, concerning for cardiac  decompensation.  6. Cluster of reticular nodular airspace disease in the right upper lobe,  possibly representing superimposed  infection/inflammatory process.  Recommend clinical correlation and short-term follow-up exam.  7. Additional findings as outlined above.      Electronically Signed by: Beto Wilson MD  Signed on: 1/9/2024 12:08 AM  Created on Workstation ID: [id number]  Signed on Workstation ID: [id number]        ANCILLARY ORDERS     Diet:  Sodium 2 Gm (Low Sodium); Fluid Restrict 2000ml (1200 From Dietary) Diet  Telemetry: On  Consults:    IP CONSULT TO CARDIAC REHAB  IP CONSULT TO CARDIOLOGY  IP CONSULT TO CARDIOLOGY  Therapy Orders:   No orders of the defined types were placed in this encounter.      ADVANCED DIRECTIVES     Code Status: Full Resuscitation         ASSESSMENT AND PLAN       73-year-old male with a past medical history of CAD status post PCI, hypertension, hyperlipidemia, type 2 diabetes mellitus, AAA presents for chest pain      1. Hypoxia, chest pain, hypertensive emergency secondary to acute on chronic heart failure exacerbation:  His serum creatinine has bumped up this morning, at this time due to improvement in his symptoms we will hold further diuresis, hold Entresto and spironolactone.    Echocardiogram reveals EF of 20%, regional wall motion abnormalities which are present, compared to prior echo LV EF is worse, we will follow up Cardiology for further evaluation and management.    2.Abnormal infiltrates on chest x-ray, possible pneumonia, leukocytosis: Continue with azithromycin, repeat CBC daily, procalcitonin slightly elevated, his lymphadenopathy might be reactive however he will require close outpatient follow-up.  Respiratory pathogen panel is negative, urine Legionella/strep is negative     3. CAD status post PCI:  Aspirin, atorvastatin     4. Hypertensive emergency:  Controlled, hold Entresto, Aldactone, Lasix, continue carvedilol, amlodipine, nitroglycerin, follow up Cardiology.     5. Type 2 diabetes mellitus:  Continue Jardiance, lispro sliding scale, monitor glucose q.a.c. q.h.s.    6. [place]:  Possibly due to diuresis, hold nephrotoxic medications, repeat BMP daily     7. Depression: Effexor     8. Colitis: Budesonide    Smoking status: Current Tobacco User    Nutrition status: Does Not Meet Criteria for Malnutrition   Body mass index is 26.87 kg/m². - Patient is overweight with BMI 24-30  DVT Prophylaxis: Lovenox prophylactic dosing (dose adjusted as per renal function)       DISCHARGE PLANNING     The patient's treatment plans were discussed with patient.    Discharge Planning    Barriers to discharge: Patient is not medically ready and needs to remain in the hospital today due to continued medical management  Anticipated discharge destination: Home  Expected Discharge Date: 1/11/2024            Rigo Huitron MDHospitalist  1/10/2024  4:00 PM      Out of season

## 2024-01-24 ENCOUNTER — NON-APPOINTMENT (OUTPATIENT)
Age: 39
End: 2024-01-24

## 2024-02-22 NOTE — ASU PREOP CHECKLIST - MUPIRONCIN COMMENTS
Abd pain, cramping, nauseous, liquids stools x3 days. States he thinks he has there may be some blood in the stool. Also would like right hand evaluated after he fell the other day and braced himself with his right hand.   
pt allergic to chlorhexadine used dial soap x 3 days

## 2024-02-25 ENCOUNTER — NON-APPOINTMENT (OUTPATIENT)
Age: 39
End: 2024-02-25

## 2024-03-08 NOTE — ED PROVIDER NOTE - NS HIV RISK FACTOR YES
[FreeTextEntry1] : Dave is a 13 yo F with a L ankle injury, suspected SH 1 fracture of distal fibula  XRs did not reveal any obvious displaced fracture. Clinical exam is consistent with a suspected SH 1 fracture of distal fibula. We recommend a lace up ankle brace, RX for orthotist referral provided. Patient is allowed to start WBAT in regular shoes. Patient can use tylenol/motrin as needed for pain.  We recommend avoiding gym/sports/physical activity for the next 3 weeks. A school note was provided. We recommended f/u in our office in 3 weeks. No XR needs to be ordered in advance for f/u visit unless clinically indicated.  Today's visit included obtaining the history from the child and parent, due to the child's age, the child could not be considered a reliable historian, requiring the parent to act as an independent historian. The condition, natural history, and prognosis were explained to the patient and family. The clinical findings and images were reviewed with the family. All questions answered. Family expressed understanding and agreement with the above.  I, Yue Miller PA-C, acted as scribe and documented the above for Dr. Lockhart.  
Declined

## 2024-04-17 NOTE — ED ADULT TRIAGE NOTE - HEIGHT IN FEET
5
Photo Preface (Leave Blank If You Do Not Want): Photographs were obtained today
Detail Level: Zone

## 2024-05-12 ENCOUNTER — NON-APPOINTMENT (OUTPATIENT)
Age: 39
End: 2024-05-12

## 2024-07-04 ENCOUNTER — NON-APPOINTMENT (OUTPATIENT)
Age: 39
End: 2024-07-04

## 2024-07-19 ENCOUNTER — TRANSCRIPTION ENCOUNTER (OUTPATIENT)
Age: 39
End: 2024-07-19

## 2024-07-25 NOTE — DISCHARGE NOTE ADULT - MEDICATION SUMMARY - MEDICATIONS TO CHANGE
I will SWITCH the dose or number of times a day I take the medications listed below when I get home from the hospital:  None

## 2024-11-01 NOTE — ED PROVIDER NOTE - TOBACCO USE
\"Have you been to the ER, urgent care clinic since your last visit?  Hospitalized since your last visit?\"    Discharged 10/27 - bilateral lower extremity edema     “Have you seen or consulted any other health care providers outside our system since your last visit?”    NO      “Have you had a colorectal cancer screening such as a colonoscopy/FIT/Cologuard?    2024    No colonoscopy on file  Date of last Cologuard: 10/2/2021  Date of last FIT: 2/6/2020   No flexible sigmoidoscopy on file     “Have you had a diabetic eye exam?”    Eye exam      Date of last diabetic eye exam: 7/27/2022            
daily 60 tablet 5    amLODIPine (NORVASC) 10 MG tablet TAKE 1 TABLET DAILY 90 tablet 3    ondansetron (ZOFRAN-ODT) 4 MG disintegrating tablet Take 1 tablet by mouth 3 times daily as needed for Nausea or Vomiting 21 tablet 0    NOVOLIN 70/30 FLEXPEN (70-30) 100 UNIT/ML injection pen INJECT 12 UNITS SUBCUTANEOUSLY TWICE DAILY 15 mL 5    glucose monitoring (FREESTYLE FREEDOM) kit 1 kit by Does not apply route daily 1 kit 0    acetaminophen (TYLENOL) 500 MG tablet Take by mouth daily as needed      aspirin 81 MG chewable tablet Take by mouth daily       No current facility-administered medications for this visit.     No Known Allergies     BMP:   Lab Results   Component Value Date/Time     11/27/2023 12:13 AM    K 3.8 11/27/2023 12:13 AM     11/27/2023 12:13 AM    CO2 29 11/27/2023 12:13 AM    BUN 13 11/27/2023 12:13 AM    CREATININE 1.00 11/27/2023 12:13 AM    GLUCOSE 180 11/27/2023 12:13 AM    CALCIUM 8.8 11/27/2023 12:13 AM      CBC:   Lab Results   Component Value Date/Time    WBC 6.2 11/27/2023 12:13 AM    RBC 4.96 11/27/2023 12:13 AM    HGB 12.8 11/27/2023 12:13 AM    HCT 40.9 11/27/2023 12:13 AM    MCV 82.5 11/27/2023 12:13 AM    MCH 25.8 11/27/2023 12:13 AM    MCHC 31.3 11/27/2023 12:13 AM    RDW 16.1 11/27/2023 12:13 AM     11/27/2023 12:13 AM    MPV 9.9 11/27/2023 12:13 AM      Lipids   Lab Results   Component Value Date/Time    CHOL 142 06/06/2022 05:04 AM    TRIG 151 06/06/2022 05:04 AM    HDL 28 06/06/2022 05:04 AM    CHOLHDLRATIO 5.1 06/06/2022 05:04 AM     Hemoglobin A1C:   Hemoglobin A1C   Date Value Ref Range Status   11/11/2022 7.1 (H) 4.0 - 5.6 % Final     Comment:     NEW METHOD  PLEASE NOTE NEW REFERENCE RANGE  (NOTE)  HbA1C Interpretive Ranges  <5.7              Normal  5.7 - 6.4         Consider Prediabetes  >6.5              Consider Diabetes       Hemoglobin A1C, POC   Date Value Ref Range Status   06/18/2021 13.9 (A) 4.8 - 5.6 % Final        Review of Systems     Physical 
Never smoker

## 2024-11-21 NOTE — ED ADULT TRIAGE NOTE - BMI (KG/M2)
Telemetry Verification   Patient placed on Telemetry Box  Verified with [facility]  Tech barak   Box # [id number]   Rate 95   Rhythm ns      39.2

## 2024-11-26 ENCOUNTER — APPOINTMENT (OUTPATIENT)
Dept: SURGERY | Facility: CLINIC | Age: 39
End: 2024-11-26
Payer: COMMERCIAL

## 2024-11-26 VITALS
HEIGHT: 67 IN | WEIGHT: 230 LBS | BODY MASS INDEX: 36.1 KG/M2 | HEART RATE: 64 BPM | OXYGEN SATURATION: 100 % | SYSTOLIC BLOOD PRESSURE: 165 MMHG | DIASTOLIC BLOOD PRESSURE: 90 MMHG

## 2024-11-26 DIAGNOSIS — Z86.79 PERSONAL HISTORY OF OTHER DISEASES OF THE CIRCULATORY SYSTEM: ICD-10-CM

## 2024-11-26 DIAGNOSIS — R22.2 LOCALIZED SWELLING, MASS AND LUMP, TRUNK: ICD-10-CM

## 2024-11-26 PROCEDURE — 99203 OFFICE O/P NEW LOW 30 MIN: CPT | Mod: 57

## 2025-02-20 ENCOUNTER — APPOINTMENT (OUTPATIENT)
Dept: OBGYN | Facility: CLINIC | Age: 40
End: 2025-02-20

## 2025-02-20 ENCOUNTER — NON-APPOINTMENT (OUTPATIENT)
Age: 40
End: 2025-02-20

## 2025-02-20 ENCOUNTER — APPOINTMENT (OUTPATIENT)
Dept: OBGYN | Facility: CLINIC | Age: 40
End: 2025-02-20
Payer: COMMERCIAL

## 2025-02-20 VITALS
WEIGHT: 236 LBS | HEART RATE: 68 BPM | HEIGHT: 67 IN | RESPIRATION RATE: 18 BRPM | DIASTOLIC BLOOD PRESSURE: 82 MMHG | SYSTOLIC BLOOD PRESSURE: 140 MMHG | BODY MASS INDEX: 37.04 KG/M2

## 2025-02-20 DIAGNOSIS — O34.219 MATERNAL CARE FOR UNSPECIFIED TYPE SCAR FROM PREVIOUS CESAREAN DELIVERY: ICD-10-CM

## 2025-02-20 DIAGNOSIS — O99.013 ANEMIA COMPLICATING PREGNANCY, THIRD TRIMESTER: ICD-10-CM

## 2025-02-20 DIAGNOSIS — Z48.89 ENCOUNTER FOR OTHER SPECIFIED SURGICAL AFTERCARE: ICD-10-CM

## 2025-02-20 DIAGNOSIS — O09.523 SUPERVISION OF ELDERLY MULTIGRAVIDA, THIRD TRIMESTER: ICD-10-CM

## 2025-02-20 DIAGNOSIS — Z01.419 ENCOUNTER FOR GYNECOLOGICAL EXAMINATION (GENERAL) (ROUTINE) W/OUT ABNORMAL FINDINGS: ICD-10-CM

## 2025-02-20 DIAGNOSIS — Z12.39 ENCOUNTER FOR OTHER SCREENING FOR MALIGNANT NEOPLASM OF BREAST: ICD-10-CM

## 2025-02-20 DIAGNOSIS — Z12.4 ENCOUNTER FOR SCREENING FOR MALIGNANT NEOPLASM OF CERVIX: ICD-10-CM

## 2025-02-20 DIAGNOSIS — O09.522 SUPERVISION OF ELDERLY MULTIGRAVIDA, SECOND TRIMESTER: ICD-10-CM

## 2025-02-20 DIAGNOSIS — R92.30 DENSE BREASTS, UNSPECIFIED: ICD-10-CM

## 2025-02-20 DIAGNOSIS — Z34.93 ENCOUNTER FOR SUPERVISION OF NORMAL PREGNANCY, UNSPECIFIED, THIRD TRIMESTER: ICD-10-CM

## 2025-02-20 DIAGNOSIS — Z3A.34 34 WEEKS GESTATION OF PREGNANCY: ICD-10-CM

## 2025-02-20 PROCEDURE — 99386 PREV VISIT NEW AGE 40-64: CPT

## 2025-02-20 PROCEDURE — 99396 PREV VISIT EST AGE 40-64: CPT

## 2025-02-21 LAB — HPV HIGH+LOW RISK DNA PNL CVX: NOT DETECTED

## 2025-02-26 LAB — CYTOLOGY CVX/VAG DOC THIN PREP: NORMAL

## 2025-03-09 ENCOUNTER — NON-APPOINTMENT (OUTPATIENT)
Age: 40
End: 2025-03-09

## 2025-03-13 ENCOUNTER — APPOINTMENT (OUTPATIENT)
Dept: ULTRASOUND IMAGING | Facility: CLINIC | Age: 40
End: 2025-03-13

## 2025-03-13 ENCOUNTER — APPOINTMENT (OUTPATIENT)
Dept: MAMMOGRAPHY | Facility: CLINIC | Age: 40
End: 2025-03-13

## 2025-03-18 ENCOUNTER — NON-APPOINTMENT (OUTPATIENT)
Age: 40
End: 2025-03-18

## 2025-03-18 DIAGNOSIS — O09.899 SUPERVISION OF OTHER HIGH RISK PREGNANCIES, UNSPECIFIED TRIMESTER: ICD-10-CM

## 2025-03-18 DIAGNOSIS — O10.919 UNSPECIFIED PRE-EXISTING HYPERTENSION COMPLICATING PREGNANCY, UNSPECIFIED TRIMESTER: ICD-10-CM

## 2025-03-31 ENCOUNTER — RESULT REVIEW (OUTPATIENT)
Age: 40
End: 2025-03-31

## 2025-03-31 ENCOUNTER — APPOINTMENT (OUTPATIENT)
Dept: ULTRASOUND IMAGING | Facility: CLINIC | Age: 40
End: 2025-03-31
Payer: COMMERCIAL

## 2025-03-31 ENCOUNTER — OUTPATIENT (OUTPATIENT)
Dept: OUTPATIENT SERVICES | Facility: HOSPITAL | Age: 40
LOS: 1 days | End: 2025-03-31
Payer: COMMERCIAL

## 2025-03-31 ENCOUNTER — APPOINTMENT (OUTPATIENT)
Dept: MAMMOGRAPHY | Facility: CLINIC | Age: 40
End: 2025-03-31
Payer: COMMERCIAL

## 2025-03-31 DIAGNOSIS — Z98.890 OTHER SPECIFIED POSTPROCEDURAL STATES: Chronic | ICD-10-CM

## 2025-03-31 DIAGNOSIS — Z98.891 HISTORY OF UTERINE SCAR FROM PREVIOUS SURGERY: Chronic | ICD-10-CM

## 2025-03-31 DIAGNOSIS — Z12.39 ENCOUNTER FOR OTHER SCREENING FOR MALIGNANT NEOPLASM OF BREAST: ICD-10-CM

## 2025-03-31 DIAGNOSIS — R92.30 DENSE BREASTS, UNSPECIFIED: ICD-10-CM

## 2025-03-31 PROCEDURE — 76641 ULTRASOUND BREAST COMPLETE: CPT | Mod: 26,50

## 2025-03-31 PROCEDURE — 77063 BREAST TOMOSYNTHESIS BI: CPT | Mod: 26

## 2025-03-31 PROCEDURE — 77067 SCR MAMMO BI INCL CAD: CPT | Mod: 26

## 2025-03-31 PROCEDURE — 77067 SCR MAMMO BI INCL CAD: CPT

## 2025-03-31 PROCEDURE — 77063 BREAST TOMOSYNTHESIS BI: CPT

## 2025-03-31 PROCEDURE — 76641 ULTRASOUND BREAST COMPLETE: CPT

## 2025-04-01 DIAGNOSIS — Z91.89 OTHER SPECIFIED PERSONAL RISK FACTORS, NOT ELSEWHERE CLASSIFIED: ICD-10-CM

## 2025-06-06 NOTE — DISCHARGE NOTE ADULT - NSTOBACCOREFERRAL_GEN_A_CS
Patient: Aneesh Brito  Location: [facility] [address]  MRN: [id number]  Today's date: 6/6/2025    History of Present Illness    Aneesh is a 76 y.o. male with a history of colon carcinoma, BPH, hypertension, glaucoma, type 2 diabetes, history of a cervical fusion over 10 years ago admitted after a fall inability to get up on his own.  He was seen at [facility] where imaging studies were performed which demonstrated DISH as well as a Chance fracture at the bottom of C7.  He was transferred to [facility] for neurosurgery.    He was taken to the OR on 6/1 by Dr. Benjamin and underwent an ORIF of the C7 Chance fracture with removal and replacement of the C3-6 posterior instrumentation as well as a C3-T3 posterior lateral fusion.  Drains remain in place.  He is on subcu heparin for DVT prophylaxis.  Currently on a nicardipine drip for blood pressure control.  He states pain is well-controlled.  He denies any other complaints of headache or dizziness, chest pain or shortness of breath.  He is voiding on his own.  He has not had a bowel movement yet.    He was seen by PT and OT needing moderate assistance of 2 to transfer sit to stand.  He ambulated 2 feet with moderate assistance of 2 using a rolling walker.  Pertinent radiology results reviewed. Pertinent lab results reviewed.      Past Medical History  Aneesh has a past medical history of C7 cervical fracture (CMS/HCC) (05/31/2025), Colonic adenoma, Diverticulosis of colon, Enlarged prostate with lower urinary tract symptoms (LUTS), Glaucoma, Hypertension, Melanoma of skin (CMS/HCC), Osteoarthritis of knee, Overweight, Spinal stenosis of lumbar region, and Type 2 diabetes mellitus (CMS/HCC).    PT Vitals      Date/Time Pulse HR Source BP BP Location BP Method Pt Position [facility]   06/06/25 1143 57 Monitor 138/68 Left upper arm Automatic Sitting ACP          PT Pain      Date/Time Pain Type Location Rating: Rest [facility]   06/06/25 1143 Pain  Assessment neck 0 ACP   06/06/25 1200 Pain Reassessment neck 0 ACP                  Prior Living Environment      Flowsheet Row Most Recent Value   People in Home spouse   Current Living Arrangements independent living facility   Home Accessibility wheelchair accessible   Living Environment Comment PTA residing c wife in [facility] ([facility] 1 floor set up. Shower stall c shower chair & grab bars. Reports no DME at toilet - information to be verified c pt. wife   Number of Stairs, Main Entrance 0   Patient Bedroom Access Comment PTA residing c wife in [facility] ([facility] 1 floor set up. Shower stall c shower chair & grab bars. Reports no DME at toilet - information to be verified c pt. wife   Bathroom Access Comment PTA residing c wife in [facility] ([facility] 1 floor set up. Shower stall c shower chair & grab bars. Reports no DME at toilet - information to be verified c pt. wife   Number of Stairs, Within Home, Primary 0       Prior Level of Function      Flowsheet Row Most Recent Value   Dominant Hand left   Ambulation assistive equipment  [rollator]   Transferring assistive equipment  [rollator]   Toileting independent   Bathing independent   Dressing independent   Eating independent   IADLs independent   Driving/Transportation    Communication understands/communicates without difficulty   Prior Level of Function Comment Pt reports being independent with use of SPC inside and Rollator to the dining jenkins. Independent ADLs. Denies other falls. Independent ADLs. (+) driving. Retired teacher   Assistive Device Currently Used at Home cane, straight, walker, 4-wheeled, shower chair        IRF PT Evaluation and Treatment - 06/06/25 1130          PT Time Calculation    Start Time 1135     Stop Time 1205     Time Calculation (min) 30 min        Session Details    Document Type Daily Treatment/Progress Note        General Information    Patient Profile Reviewed yes     Patient/Family/Caregiver  Comments/Observations Pt. daughter present in gym at beginning of session.     General Observations of Patient Pt. recieved sitting in wc in main gym, pleasant and agreeable to PT.        Mobility Belt    Mobility Belt Used During Session yes        Orthosis Neck Cervical Collar    Orthosis Properties Date Obtained: 06/01/25 Time Obtained: 1803 Location: Neck Type: Cervical Collar Therapeutic Indications: stabilization and support Wearing Schedule: wear when out of bed       Orthosis Neck Cervical Collar    Orthosis Properties Date Obtained: 06/01/25 Time Obtained: 0000 Location: Neck Type: Cervical Collar Features: Hard Description: [place] collar OOB don seated; [place] collar for shower Therapeutic Indications: stabilization and support Wearing Schedule: wear when out of bed       Sit to Stand Transfer    [place], Sit to Stand Transfer moderate assist (50-74% patient effort)     Safety/Cues increased time to complete;hand placement;technique     Assistive Device walker, front-wheeled     Comment multi stand trials from EOM and wc with RW. Mod A for pelvic rise and steadying        Stand to Sit Transfer    [place], Stand to Sit Transfer moderate assist (50-74% patient effort)     Safety/Cues technique;hand placement;increased time to complete     Assistive Device walker, front-wheeled     Comment to wc and EOM from stance with RW, Mod A for controlled decent and VC's for handplacement        Stand Pivot Transfer    [place], Stand Pivot/Stand Step Transfer moderate assist (50-74% patient effort)     Safety/Cues increased time to complete;technique;preparatory posture;sequencing;proper use of assistive device     Assistive Device walker, front-wheeled     Comment SPT from wc<>EOM. Mod A for balance and lateral wt shifting.        Outcome Measures    Five Times to Sit to Stand (FTSTS) 29.27 sec        Daily Progress Summary (PT)    Daily Outcome Statement Session focused on 5TSTS and SPT with RW.  5TSTS score indicates increased fall risk at 29 seconds for age norms. Continue to progress as able per PT POC with transfers, functional mobility, ambulation, and retest of 10MWT.                          IRF PT Goals      Flowsheet Row Most Recent Value   Bed Mobility Goal 1    Activity/Assistive Device rolling to left, rolling to right, sit to supine/supine to sit at 06/05/2025 0902   [place] minimum assist (75% or more patient effort) at 06/05/2025 0902   Time Frame short-term goal (STG), 1 week at 06/05/2025 0902   Bed Mobility Goal 2    Activity/Assistive Device rolling to left, rolling to right, sit to supine/supine to sit at 06/05/2025 0902   [place] modified independence at 06/05/2025 0902   Time Frame long-term goal (LTG), 3 weeks at 06/05/2025 0902   Transfer Goal 1    Activity/Assistive Device sit-to-stand/stand-to-sit, stand pivot at 06/05/2025 0902   [place] minimum assist (75% or more patient effort) at 06/05/2025 0902   Time Frame short-term goal (STG), 1 week at 06/05/2025 0902   Transfer Goal 2    Activity/Assistive Device sit-to-stand/stand-to-sit, stand pivot at 06/05/2025 0902   [place] modified independence at 06/05/2025 0902   Time Frame long-term goal (LTG), 3 weeks at 06/05/2025 0902   Gait/Walking Locomotion Goal 1    Activity/Assistive Device gait (walking locomotion) at 06/05/2025 0902   Distance 50 feet at 06/05/2025 0902   [place] minimum assist (75% or more patient effort) at 06/05/2025 0902   Time Frame short-term goal (STG), 1 week at 06/05/2025 0902   Gait/Walking Locomotion Goal 2    Activity/Assistive Device gait (walking locomotion) at 06/05/2025 0902   Distance 150 feet at 06/05/2025 0902   [place] supervision required at 06/05/2025 0902   Time Frame long-term goal (LTG), 3 weeks at 06/05/2025 0902      Patient declined information

## 2025-06-20 ENCOUNTER — EMERGENCY (EMERGENCY)
Facility: HOSPITAL | Age: 40
LOS: 0 days | Discharge: HOME | End: 2025-06-20
Attending: EMERGENCY MEDICINE
Payer: COMMERCIAL

## 2025-06-20 VITALS
OXYGEN SATURATION: 100 % | RESPIRATION RATE: 18 BRPM | DIASTOLIC BLOOD PRESSURE: 96 MMHG | HEART RATE: 71 BPM | TEMPERATURE: 98 F | SYSTOLIC BLOOD PRESSURE: 158 MMHG

## 2025-06-20 VITALS
SYSTOLIC BLOOD PRESSURE: 191 MMHG | OXYGEN SATURATION: 98 % | DIASTOLIC BLOOD PRESSURE: 119 MMHG | TEMPERATURE: 98 F | RESPIRATION RATE: 19 BRPM | HEART RATE: 77 BPM
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
## 2025-06-20 DIAGNOSIS — Z98.891 HISTORY OF UTERINE SCAR FROM PREVIOUS SURGERY: Chronic | ICD-10-CM

## 2025-06-20 DIAGNOSIS — S50.812A ABRASION OF LEFT FOREARM, INITIAL ENCOUNTER: ICD-10-CM

## 2025-06-20 DIAGNOSIS — M79.631 PAIN IN RIGHT FOREARM: ICD-10-CM

## 2025-06-20 DIAGNOSIS — Z88.3 ALLERGY STATUS TO OTHER ANTI-INFECTIVE AGENTS: ICD-10-CM

## 2025-06-20 DIAGNOSIS — G47.33 OBSTRUCTIVE SLEEP APNEA (ADULT) (PEDIATRIC): ICD-10-CM

## 2025-06-20 DIAGNOSIS — S80.212A ABRASION, LEFT KNEE, INITIAL ENCOUNTER: ICD-10-CM

## 2025-06-20 DIAGNOSIS — Z98.890 OTHER SPECIFIED POSTPROCEDURAL STATES: Chronic | ICD-10-CM

## 2025-06-20 DIAGNOSIS — K80.20 CALCULUS OF GALLBLADDER WITHOUT CHOLECYSTITIS WITHOUT OBSTRUCTION: ICD-10-CM

## 2025-06-20 DIAGNOSIS — I10 ESSENTIAL (PRIMARY) HYPERTENSION: ICD-10-CM

## 2025-06-20 DIAGNOSIS — M25.562 PAIN IN LEFT KNEE: ICD-10-CM
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
## 2025-06-20 DIAGNOSIS — M79.632 PAIN IN LEFT FOREARM: ICD-10-CM

## 2025-06-20 DIAGNOSIS — Z86.39 PERSONAL HISTORY OF OTHER ENDOCRINE, NUTRITIONAL AND METABOLIC DISEASE: ICD-10-CM

## 2025-06-20 DIAGNOSIS — S50.811A ABRASION OF RIGHT FOREARM, INITIAL ENCOUNTER: ICD-10-CM

## 2025-06-20 LAB
ALBUMIN SERPL ELPH-MCNC: 3.6 G/DL — SIGNIFICANT CHANGE UP (ref 3.3–5)
ALP SERPL-CCNC: 71 U/L — SIGNIFICANT CHANGE UP (ref 40–120)
ALT FLD-CCNC: 23 U/L — SIGNIFICANT CHANGE UP (ref 12–78)
ANION GAP SERPL CALC-SCNC: 4 MMOL/L — LOW (ref 5–17)
APTT BLD: 32.2 SEC — SIGNIFICANT CHANGE UP (ref 26.1–36.8)
AST SERPL-CCNC: 15 U/L — SIGNIFICANT CHANGE UP (ref 15–37)
BASOPHILS # BLD AUTO: 0.03 K/UL — SIGNIFICANT CHANGE UP (ref 0–0.2)
BASOPHILS NFR BLD AUTO: 0.6 % — SIGNIFICANT CHANGE UP (ref 0–2)
BILIRUB SERPL-MCNC: 1.3 MG/DL — HIGH (ref 0.2–1.2)
BUN SERPL-MCNC: 16 MG/DL — SIGNIFICANT CHANGE UP (ref 7–23)
CALCIUM SERPL-MCNC: 9.4 MG/DL — SIGNIFICANT CHANGE UP (ref 8.5–10.1)
CHLORIDE SERPL-SCNC: 107 MMOL/L — SIGNIFICANT CHANGE UP (ref 96–108)
CO2 SERPL-SCNC: 27 MMOL/L — SIGNIFICANT CHANGE UP (ref 22–31)
CREAT SERPL-MCNC: 0.76 MG/DL — SIGNIFICANT CHANGE UP (ref 0.5–1.3)
EGFR: 102 ML/MIN/1.73M2 — SIGNIFICANT CHANGE UP
EGFR: 102 ML/MIN/1.73M2 — SIGNIFICANT CHANGE UP
EOSINOPHIL # BLD AUTO: 0.15 K/UL — SIGNIFICANT CHANGE UP (ref 0–0.5)
EOSINOPHIL NFR BLD AUTO: 3 % — SIGNIFICANT CHANGE UP (ref 0–6)
GLUCOSE SERPL-MCNC: 100 MG/DL — HIGH (ref 70–99)
HCG SERPL-ACNC: <1 MIU/ML — SIGNIFICANT CHANGE UP
HCT VFR BLD CALC: 38.5 % — SIGNIFICANT CHANGE UP (ref 34.5–45)
HGB BLD-MCNC: 12.7 G/DL — SIGNIFICANT CHANGE UP (ref 11.5–15.5)
IMM GRANULOCYTES # BLD AUTO: 0.01 K/UL — SIGNIFICANT CHANGE UP (ref 0–0.07)
IMM GRANULOCYTES NFR BLD AUTO: 0.2 % — SIGNIFICANT CHANGE UP (ref 0–0.9)
INR BLD: 0.93 RATIO — SIGNIFICANT CHANGE UP (ref 0.85–1.16)
LACTATE SERPL-SCNC: 0.9 MMOL/L — SIGNIFICANT CHANGE UP (ref 0.7–2)
LIDOCAIN IGE QN: 18 U/L — SIGNIFICANT CHANGE UP (ref 13–75)
LYMPHOCYTES # BLD AUTO: 1.35 K/UL — SIGNIFICANT CHANGE UP (ref 1–3.3)
LYMPHOCYTES NFR BLD AUTO: 27.4 % — SIGNIFICANT CHANGE UP (ref 13–44)
MCHC RBC-ENTMCNC: 27.3 PG — SIGNIFICANT CHANGE UP (ref 27–34)
MCHC RBC-ENTMCNC: 33 G/DL — SIGNIFICANT CHANGE UP (ref 32–36)
MCV RBC AUTO: 82.6 FL — SIGNIFICANT CHANGE UP (ref 80–100)
MONOCYTES # BLD AUTO: 0.42 K/UL — SIGNIFICANT CHANGE UP (ref 0–0.9)
MONOCYTES NFR BLD AUTO: 8.5 % — SIGNIFICANT CHANGE UP (ref 2–14)
NEUTROPHILS # BLD AUTO: 2.96 K/UL — SIGNIFICANT CHANGE UP (ref 1.8–7.4)
NEUTROPHILS NFR BLD AUTO: 60.3 % — SIGNIFICANT CHANGE UP (ref 43–77)
NRBC # BLD AUTO: 0 K/UL — SIGNIFICANT CHANGE UP (ref 0–0)
NRBC # FLD: 0 K/UL — SIGNIFICANT CHANGE UP (ref 0–0)
NRBC BLD AUTO-RTO: 0 /100 WBCS — SIGNIFICANT CHANGE UP (ref 0–0)
PLATELET # BLD AUTO: 235 K/UL — SIGNIFICANT CHANGE UP (ref 150–400)
PMV BLD: 9.9 FL — SIGNIFICANT CHANGE UP (ref 7–13)
POTASSIUM SERPL-MCNC: 3.7 MMOL/L — SIGNIFICANT CHANGE UP (ref 3.5–5.3)
POTASSIUM SERPL-SCNC: 3.7 MMOL/L — SIGNIFICANT CHANGE UP (ref 3.5–5.3)
PROT SERPL-MCNC: 7.4 GM/DL — SIGNIFICANT CHANGE UP (ref 6–8.3)
PROTHROM AB SERPL-ACNC: 11 SEC — SIGNIFICANT CHANGE UP (ref 9.9–13.4)
RBC # BLD: 4.66 M/UL — SIGNIFICANT CHANGE UP (ref 3.8–5.2)
RBC # FLD: 13.2 % — SIGNIFICANT CHANGE UP (ref 10.3–14.5)
SODIUM SERPL-SCNC: 138 MMOL/L — SIGNIFICANT CHANGE UP (ref 135–145)
WBC # BLD: 4.92 K/UL — SIGNIFICANT CHANGE UP (ref 3.8–10.5)
WBC # FLD AUTO: 4.92 K/UL — SIGNIFICANT CHANGE UP (ref 3.8–10.5)

## 2025-06-20 PROCEDURE — 73090 X-RAY EXAM OF FOREARM: CPT | Mod: 26,50

## 2025-06-20 PROCEDURE — 96374 THER/PROPH/DIAG INJ IV PUSH: CPT | Mod: XU

## 2025-06-20 PROCEDURE — 73590 X-RAY EXAM OF LOWER LEG: CPT | Mod: LT

## 2025-06-20 PROCEDURE — 93010 ELECTROCARDIOGRAM REPORT: CPT

## 2025-06-20 PROCEDURE — 73562 X-RAY EXAM OF KNEE 3: CPT | Mod: 26,LT

## 2025-06-20 PROCEDURE — 71260 CT THORAX DX C+: CPT

## 2025-06-20 PROCEDURE — 80053 COMPREHEN METABOLIC PANEL: CPT

## 2025-06-20 PROCEDURE — 72125 CT NECK SPINE W/O DYE: CPT | Mod: 26

## 2025-06-20 PROCEDURE — 74177 CT ABD & PELVIS W/CONTRAST: CPT | Mod: 26

## 2025-06-20 PROCEDURE — 72125 CT NECK SPINE W/O DYE: CPT

## 2025-06-20 PROCEDURE — 93005 ELECTROCARDIOGRAM TRACING: CPT

## 2025-06-20 PROCEDURE — 99285 EMERGENCY DEPT VISIT HI MDM: CPT

## 2025-06-20 PROCEDURE — 74177 CT ABD & PELVIS W/CONTRAST: CPT

## 2025-06-20 PROCEDURE — 83605 ASSAY OF LACTIC ACID: CPT

## 2025-06-20 PROCEDURE — 70450 CT HEAD/BRAIN W/O DYE: CPT | Mod: 26

## 2025-06-20 PROCEDURE — 70450 CT HEAD/BRAIN W/O DYE: CPT

## 2025-06-20 PROCEDURE — 71260 CT THORAX DX C+: CPT | Mod: 26

## 2025-06-20 PROCEDURE — 85025 COMPLETE CBC W/AUTO DIFF WBC: CPT

## 2025-06-20 PROCEDURE — 73590 X-RAY EXAM OF LOWER LEG: CPT | Mod: 26,LT

## 2025-06-20 PROCEDURE — 84702 CHORIONIC GONADOTROPIN TEST: CPT

## 2025-06-20 PROCEDURE — 73562 X-RAY EXAM OF KNEE 3: CPT | Mod: LT

## 2025-06-20 PROCEDURE — 85730 THROMBOPLASTIN TIME PARTIAL: CPT

## 2025-06-20 PROCEDURE — 36415 COLL VENOUS BLD VENIPUNCTURE: CPT

## 2025-06-20 PROCEDURE — 99285 EMERGENCY DEPT VISIT HI MDM: CPT | Mod: 25

## 2025-06-20 PROCEDURE — 83690 ASSAY OF LIPASE: CPT

## 2025-06-20 PROCEDURE — 85610 PROTHROMBIN TIME: CPT

## 2025-06-20 PROCEDURE — 73090 X-RAY EXAM OF FOREARM: CPT | Mod: 50

## 2025-06-20 RX ORDER — ACETAMINOPHEN 500 MG/5ML
1000 LIQUID (ML) ORAL ONCE
Refills: 0 | Status: COMPLETED | OUTPATIENT
Start: 2025-06-20 | End: 2025-06-20

## 2025-06-20 RX ADMIN — Medication 400 MILLIGRAM(S): at 10:45

## 2025-06-20 RX ADMIN — Medication 1000 MILLILITER(S): at 10:45
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
## 2025-06-20 NOTE — ED PROVIDER NOTE - PATIENT PORTAL LINK FT
You can access the FollowMyHealth Patient Portal offered by [facility] by registering at the following website: [email or web address]. By joining "[employer]’s FollowMyHealth portal, you will also be able to view your health information using other applications (apps) compatible with our system.
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
## 2025-06-20 NOTE — ED PROVIDER NOTE - NSFOLLOWUPINSTRUCTIONS_ED_ALL_ED_FT
please follow up with your doctor in 2-3 days.   take motrin and tylenol for pain.   may also apply ice packs for comfort.   drink plenty of fluids.   return to ED for any concerns
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
## 2025-06-20 NOTE — ED ADULT NURSE NOTE - NS_ED_NURSE_TEACHING_TOPIC_ED_A_ED
Pt educated by  prior to d/c home, pt wanted further follow up regarding  d/c instructions, all of ptt's instructions discussed anfd answered at this time, pt speaking in clear/full sentances. PT educated on all d/c instructions with return verbal  understanding of all d/c instructions to myself  at this time.

## 2025-06-20 NOTE — ED ADULT NURSE NOTE - NSFALLRISKASMT_ED_ALL_ED_DT
20-Jun-2025 09:14 Maliha    Caller (mother) states patient has been running a fever since yesterday - with yesterdays highest being 103 and today it is 101.6 (rectal) is looking to discus with nurse     Caller states ok to leave a detailed message
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
## 2025-06-20 NOTE — ED ADULT NURSE REASSESSMENT NOTE - NS ED NURSE REASSESS COMMENT FT1
Received pt from Yassine RN, verbal order received to d/c cardiac monitor and TA is clear handed in report from Yassine, pt ambulated with steady gait.

## 2025-06-30 ENCOUNTER — APPOINTMENT (OUTPATIENT)
Dept: ORTHOPEDIC SURGERY | Facility: CLINIC | Age: 40
End: 2025-06-30
Payer: COMMERCIAL

## 2025-06-30 VITALS — HEIGHT: 67 IN | WEIGHT: 245 LBS | BODY MASS INDEX: 38.45 KG/M2

## 2025-06-30 PROBLEM — S50.11XA CONTUSION OF RIGHT FOREARM, INITIAL ENCOUNTER: Status: ACTIVE | Noted: 2025-06-30

## 2025-06-30 PROCEDURE — 99203 OFFICE O/P NEW LOW 30 MIN: CPT

## 2025-07-01 ENCOUNTER — APPOINTMENT (OUTPATIENT)
Dept: ORTHOPEDIC SURGERY | Facility: CLINIC | Age: 40
End: 2025-07-01

## 2025-07-07 NOTE — PATIENT PROFILE OB - VDRL/RPR: DATE, OB PROFILE
Pt refuses vitals at this time and refuses to have telemetry placed. Writer educated importance of monitoring post surgery. Pt is crying and reports that she is in \"too much pain\".     Writer informed general surgery of pt hypertension, pt concerns, and refusal of vitals/telemetry at this time.     Per general surgery, okay to give IV dilaudid a this time.    22-Nov-2022